# Patient Record
Sex: FEMALE | Race: OTHER | URBAN - METROPOLITAN AREA
[De-identification: names, ages, dates, MRNs, and addresses within clinical notes are randomized per-mention and may not be internally consistent; named-entity substitution may affect disease eponyms.]

---

## 2020-05-21 ENCOUNTER — OP HISTORICAL/CONVERTED ENCOUNTER (OUTPATIENT)
Dept: OTHER | Age: 60
End: 2020-05-21

## 2020-06-01 ENCOUNTER — OP HISTORICAL/CONVERTED ENCOUNTER (OUTPATIENT)
Dept: OTHER | Age: 60
End: 2020-06-01

## 2020-06-18 ENCOUNTER — ED HISTORICAL/CONVERTED ENCOUNTER (OUTPATIENT)
Dept: OTHER | Age: 60
End: 2020-06-18

## 2021-03-03 ENCOUNTER — TRANSCRIBE ORDER (OUTPATIENT)
Dept: SCHEDULING | Age: 61
End: 2021-03-03

## 2021-03-03 DIAGNOSIS — Z12.31 VISIT FOR SCREENING MAMMOGRAM: Primary | ICD-10-CM

## 2023-05-20 RX ORDER — HYDROCHLOROTHIAZIDE 25 MG/1
25 TABLET ORAL DAILY
COMMUNITY
Start: 2016-06-03

## 2023-09-29 ENCOUNTER — APPOINTMENT (OUTPATIENT)
Facility: HOSPITAL | Age: 63
End: 2023-09-29
Payer: COMMERCIAL

## 2023-09-29 ENCOUNTER — HOSPITAL ENCOUNTER (INPATIENT)
Facility: HOSPITAL | Age: 63
LOS: 3 days | Discharge: HOME HEALTH | End: 2023-10-03
Attending: EMERGENCY MEDICINE | Admitting: HOSPITALIST
Payer: COMMERCIAL

## 2023-09-29 DIAGNOSIS — I10 MALIGNANT HYPERTENSION: Primary | ICD-10-CM

## 2023-09-29 DIAGNOSIS — R42 DIZZINESS: ICD-10-CM

## 2023-09-29 DIAGNOSIS — I67.1 CEREBRAL ANEURYSM: ICD-10-CM

## 2023-09-29 DIAGNOSIS — I16.0 HYPERTENSIVE URGENCY: ICD-10-CM

## 2023-09-29 LAB
ABO + RH BLD: NORMAL
ALBUMIN SERPL-MCNC: 3.8 G/DL (ref 3.5–5)
ALBUMIN/GLOB SERPL: 1 (ref 1.1–2.2)
ALP SERPL-CCNC: 116 U/L (ref 45–117)
ALT SERPL-CCNC: 20 U/L (ref 12–78)
ANION GAP SERPL CALC-SCNC: 10 MMOL/L (ref 5–15)
APTT PPP: 33.4 SEC (ref 21.2–34.1)
AST SERPL W P-5'-P-CCNC: 17 U/L (ref 15–37)
BASOPHILS # BLD: 0.1 K/UL (ref 0–0.1)
BASOPHILS NFR BLD: 1 % (ref 0–1)
BILIRUB SERPL-MCNC: 0.3 MG/DL (ref 0.2–1)
BLOOD GROUP ANTIBODIES SERPL: NEGATIVE
BNP SERPL-MCNC: 874 PG/ML
BUN SERPL-MCNC: 33 MG/DL (ref 6–20)
BUN/CREAT SERPL: 26 (ref 12–20)
CA-I BLD-MCNC: 9.3 MG/DL (ref 8.5–10.1)
CHLORIDE SERPL-SCNC: 107 MMOL/L (ref 97–108)
CO2 SERPL-SCNC: 25 MMOL/L (ref 21–32)
CREAT SERPL-MCNC: 1.27 MG/DL (ref 0.55–1.02)
DIFFERENTIAL METHOD BLD: ABNORMAL
EOSINOPHIL # BLD: 0.3 K/UL (ref 0–0.4)
EOSINOPHIL NFR BLD: 3 % (ref 0–7)
ERYTHROCYTE [DISTWIDTH] IN BLOOD BY AUTOMATED COUNT: 13.1 % (ref 11.5–14.5)
GLOBULIN SER CALC-MCNC: 3.7 G/DL (ref 2–4)
GLUCOSE SERPL-MCNC: 128 MG/DL (ref 65–100)
HCT VFR BLD AUTO: 40.1 % (ref 35–47)
HGB BLD-MCNC: 13.6 G/DL (ref 11.5–16)
IMM GRANULOCYTES # BLD AUTO: 0 K/UL (ref 0–0.04)
IMM GRANULOCYTES NFR BLD AUTO: 0 % (ref 0–0.5)
INR PPP: 0.9 (ref 0.9–1.1)
LYMPHOCYTES # BLD: 4.7 K/UL (ref 0.8–3.5)
LYMPHOCYTES NFR BLD: 47 % (ref 12–49)
MAGNESIUM SERPL-MCNC: 2.3 MG/DL (ref 1.6–2.4)
MCH RBC QN AUTO: 29.6 PG (ref 26–34)
MCHC RBC AUTO-ENTMCNC: 33.9 G/DL (ref 30–36.5)
MCV RBC AUTO: 87.4 FL (ref 80–99)
MONOCYTES # BLD: 0.7 K/UL (ref 0–1)
MONOCYTES NFR BLD: 7 % (ref 5–13)
NEUTS SEG # BLD: 4.2 K/UL (ref 1.8–8)
NEUTS SEG NFR BLD: 42 % (ref 32–75)
NRBC # BLD: 0 K/UL (ref 0–0.01)
NRBC BLD-RTO: 0 PER 100 WBC
PLATELET # BLD AUTO: 220 K/UL (ref 150–400)
PMV BLD AUTO: 12.5 FL (ref 8.9–12.9)
POTASSIUM SERPL-SCNC: 4 MMOL/L (ref 3.5–5.1)
PROT SERPL-MCNC: 7.5 G/DL (ref 6.4–8.2)
PROTHROMBIN TIME: 13 SEC (ref 11.9–14.6)
RBC # BLD AUTO: 4.59 M/UL (ref 3.8–5.2)
SODIUM SERPL-SCNC: 142 MMOL/L (ref 136–145)
SPECIMEN EXP DATE BLD: NORMAL
THERAPEUTIC RANGE: NORMAL SEC (ref 82–109)
TROPONIN I SERPL HS-MCNC: 16 NG/L (ref 0–51)
WBC # BLD AUTO: 10 K/UL (ref 3.6–11)

## 2023-09-29 PROCEDURE — 96375 TX/PRO/DX INJ NEW DRUG ADDON: CPT

## 2023-09-29 PROCEDURE — 94761 N-INVAS EAR/PLS OXIMETRY MLT: CPT

## 2023-09-29 PROCEDURE — 86850 RBC ANTIBODY SCREEN: CPT

## 2023-09-29 PROCEDURE — 83880 ASSAY OF NATRIURETIC PEPTIDE: CPT

## 2023-09-29 PROCEDURE — 84484 ASSAY OF TROPONIN QUANT: CPT

## 2023-09-29 PROCEDURE — 85610 PROTHROMBIN TIME: CPT

## 2023-09-29 PROCEDURE — 85025 COMPLETE CBC W/AUTO DIFF WBC: CPT

## 2023-09-29 PROCEDURE — 96374 THER/PROPH/DIAG INJ IV PUSH: CPT

## 2023-09-29 PROCEDURE — 99285 EMERGENCY DEPT VISIT HI MDM: CPT

## 2023-09-29 PROCEDURE — 80053 COMPREHEN METABOLIC PANEL: CPT

## 2023-09-29 PROCEDURE — 93005 ELECTROCARDIOGRAM TRACING: CPT | Performed by: EMERGENCY MEDICINE

## 2023-09-29 PROCEDURE — 86900 BLOOD TYPING SEROLOGIC ABO: CPT

## 2023-09-29 PROCEDURE — 71045 X-RAY EXAM CHEST 1 VIEW: CPT

## 2023-09-29 PROCEDURE — 6360000002 HC RX W HCPCS: Performed by: EMERGENCY MEDICINE

## 2023-09-29 PROCEDURE — 85730 THROMBOPLASTIN TIME PARTIAL: CPT

## 2023-09-29 PROCEDURE — 70450 CT HEAD/BRAIN W/O DYE: CPT

## 2023-09-29 PROCEDURE — 6360000004 HC RX CONTRAST MEDICATION: Performed by: EMERGENCY MEDICINE

## 2023-09-29 PROCEDURE — 36415 COLL VENOUS BLD VENIPUNCTURE: CPT

## 2023-09-29 PROCEDURE — 2500000003 HC RX 250 WO HCPCS: Performed by: EMERGENCY MEDICINE

## 2023-09-29 PROCEDURE — 83735 ASSAY OF MAGNESIUM: CPT

## 2023-09-29 PROCEDURE — 70498 CT ANGIOGRAPHY NECK: CPT

## 2023-09-29 PROCEDURE — 86901 BLOOD TYPING SEROLOGIC RH(D): CPT

## 2023-09-29 PROCEDURE — 2580000003 HC RX 258: Performed by: EMERGENCY MEDICINE

## 2023-09-29 RX ORDER — LABETALOL HYDROCHLORIDE 5 MG/ML
20 INJECTION, SOLUTION INTRAVENOUS ONCE
Status: DISCONTINUED | OUTPATIENT
Start: 2023-09-29 | End: 2023-09-29

## 2023-09-29 RX ORDER — NITROGLYCERIN 20 MG/100ML
5-200 INJECTION INTRAVENOUS CONTINUOUS
Status: DISCONTINUED | OUTPATIENT
Start: 2023-09-29 | End: 2023-09-29

## 2023-09-29 RX ORDER — LABETALOL HYDROCHLORIDE 5 MG/ML
10 INJECTION, SOLUTION INTRAVENOUS ONCE
Status: COMPLETED | OUTPATIENT
Start: 2023-09-29 | End: 2023-09-29

## 2023-09-29 RX ADMIN — IOPAMIDOL 100 ML: 755 INJECTION, SOLUTION INTRAVENOUS at 23:26

## 2023-09-29 RX ADMIN — SODIUM CHLORIDE 5 MG/HR: 9 INJECTION, SOLUTION INTRAVENOUS at 23:57

## 2023-09-29 RX ADMIN — LABETALOL HYDROCHLORIDE 10 MG: 5 INJECTION INTRAVENOUS at 22:32

## 2023-09-29 ASSESSMENT — PAIN - FUNCTIONAL ASSESSMENT: PAIN_FUNCTIONAL_ASSESSMENT: 0-10

## 2023-09-29 ASSESSMENT — LIFESTYLE VARIABLES
HOW OFTEN DO YOU HAVE A DRINK CONTAINING ALCOHOL: NEVER
HOW MANY STANDARD DRINKS CONTAINING ALCOHOL DO YOU HAVE ON A TYPICAL DAY: PATIENT DOES NOT DRINK

## 2023-09-30 PROBLEM — I10 MALIGNANT HYPERTENSION: Status: ACTIVE | Noted: 2023-09-30

## 2023-09-30 PROBLEM — I67.1 ANEURYSM OF INTRACRANIAL PORTION OF INTERNAL CAROTID ARTERY: Status: ACTIVE | Noted: 2023-09-30

## 2023-09-30 LAB
ALBUMIN SERPL-MCNC: 3.6 G/DL (ref 3.5–5)
ANION GAP SERPL CALC-SCNC: 7 MMOL/L (ref 5–15)
BUN SERPL-MCNC: 23 MG/DL (ref 6–20)
BUN/CREAT SERPL: 20 (ref 12–20)
CA-I BLD-MCNC: 9.3 MG/DL (ref 8.5–10.1)
CHLORIDE SERPL-SCNC: 107 MMOL/L (ref 97–108)
CO2 SERPL-SCNC: 28 MMOL/L (ref 21–32)
CREAT SERPL-MCNC: 1.14 MG/DL (ref 0.55–1.02)
EKG ATRIAL RATE: 71 BPM
EKG DIAGNOSIS: NORMAL
EKG P AXIS: 24 DEGREES
EKG P-R INTERVAL: 156 MS
EKG Q-T INTERVAL: 376 MS
EKG QRS DURATION: 88 MS
EKG QTC CALCULATION (BAZETT): 408 MS
EKG R AXIS: 23 DEGREES
EKG T AXIS: 125 DEGREES
EKG VENTRICULAR RATE: 71 BPM
GLUCOSE SERPL-MCNC: 116 MG/DL (ref 65–100)
PHOSPHATE SERPL-MCNC: 3.2 MG/DL (ref 2.6–4.7)
POTASSIUM SERPL-SCNC: 3.1 MMOL/L (ref 3.5–5.1)
SODIUM SERPL-SCNC: 142 MMOL/L (ref 136–145)
TROPONIN I SERPL HS-MCNC: 17 NG/L (ref 0–51)

## 2023-09-30 PROCEDURE — 6360000002 HC RX W HCPCS: Performed by: HOSPITALIST

## 2023-09-30 PROCEDURE — 6370000000 HC RX 637 (ALT 250 FOR IP): Performed by: HOSPITALIST

## 2023-09-30 PROCEDURE — 80069 RENAL FUNCTION PANEL: CPT

## 2023-09-30 PROCEDURE — 94761 N-INVAS EAR/PLS OXIMETRY MLT: CPT

## 2023-09-30 PROCEDURE — 6370000000 HC RX 637 (ALT 250 FOR IP): Performed by: INTERNAL MEDICINE

## 2023-09-30 PROCEDURE — 2580000003 HC RX 258: Performed by: HOSPITALIST

## 2023-09-30 PROCEDURE — 84484 ASSAY OF TROPONIN QUANT: CPT

## 2023-09-30 PROCEDURE — 2000000000 HC ICU R&B

## 2023-09-30 PROCEDURE — 36415 COLL VENOUS BLD VENIPUNCTURE: CPT

## 2023-09-30 RX ORDER — POTASSIUM CHLORIDE 20 MEQ/1
20 TABLET, EXTENDED RELEASE ORAL 3 TIMES DAILY
Status: COMPLETED | OUTPATIENT
Start: 2023-09-30 | End: 2023-09-30

## 2023-09-30 RX ORDER — SODIUM CHLORIDE 0.9 % (FLUSH) 0.9 %
5-40 SYRINGE (ML) INJECTION PRN
Status: DISCONTINUED | OUTPATIENT
Start: 2023-09-30 | End: 2023-10-03 | Stop reason: HOSPADM

## 2023-09-30 RX ORDER — LOSARTAN POTASSIUM 50 MG/1
50 TABLET ORAL DAILY
Status: DISCONTINUED | OUTPATIENT
Start: 2023-09-30 | End: 2023-10-03 | Stop reason: HOSPADM

## 2023-09-30 RX ORDER — NICOTINE 21 MG/24HR
1 PATCH, TRANSDERMAL 24 HOURS TRANSDERMAL DAILY
Status: DISCONTINUED | OUTPATIENT
Start: 2023-09-30 | End: 2023-10-03 | Stop reason: HOSPADM

## 2023-09-30 RX ORDER — SODIUM CHLORIDE 9 MG/ML
INJECTION, SOLUTION INTRAVENOUS PRN
Status: DISCONTINUED | OUTPATIENT
Start: 2023-09-30 | End: 2023-10-03 | Stop reason: HOSPADM

## 2023-09-30 RX ORDER — ACETAMINOPHEN 325 MG/1
650 TABLET ORAL EVERY 6 HOURS PRN
Status: DISCONTINUED | OUTPATIENT
Start: 2023-09-30 | End: 2023-10-03 | Stop reason: HOSPADM

## 2023-09-30 RX ORDER — AMLODIPINE BESYLATE 10 MG/1
10 TABLET ORAL DAILY
COMMUNITY

## 2023-09-30 RX ORDER — MAGNESIUM SULFATE IN WATER 40 MG/ML
2000 INJECTION, SOLUTION INTRAVENOUS ONCE
Status: COMPLETED | OUTPATIENT
Start: 2023-09-30 | End: 2023-09-30

## 2023-09-30 RX ORDER — SODIUM CHLORIDE 0.9 % (FLUSH) 0.9 %
5-40 SYRINGE (ML) INJECTION EVERY 12 HOURS SCHEDULED
Status: DISCONTINUED | OUTPATIENT
Start: 2023-09-30 | End: 2023-10-03 | Stop reason: HOSPADM

## 2023-09-30 RX ORDER — AMLODIPINE BESYLATE 5 MG/1
5 TABLET ORAL NIGHTLY
Status: DISCONTINUED | OUTPATIENT
Start: 2023-09-30 | End: 2023-10-03 | Stop reason: HOSPADM

## 2023-09-30 RX ORDER — ACETAMINOPHEN 650 MG/1
650 SUPPOSITORY RECTAL EVERY 6 HOURS PRN
Status: DISCONTINUED | OUTPATIENT
Start: 2023-09-30 | End: 2023-10-03 | Stop reason: HOSPADM

## 2023-09-30 RX ORDER — HYDROCHLOROTHIAZIDE 25 MG/1
25 TABLET ORAL DAILY
Status: DISCONTINUED | OUTPATIENT
Start: 2023-09-30 | End: 2023-10-03 | Stop reason: HOSPADM

## 2023-09-30 RX ORDER — ONDANSETRON 2 MG/ML
4 INJECTION INTRAMUSCULAR; INTRAVENOUS EVERY 6 HOURS PRN
Status: DISCONTINUED | OUTPATIENT
Start: 2023-09-30 | End: 2023-10-03 | Stop reason: HOSPADM

## 2023-09-30 RX ORDER — ONDANSETRON 4 MG/1
4 TABLET, ORALLY DISINTEGRATING ORAL EVERY 8 HOURS PRN
Status: DISCONTINUED | OUTPATIENT
Start: 2023-09-30 | End: 2023-10-03 | Stop reason: HOSPADM

## 2023-09-30 RX ADMIN — POTASSIUM CHLORIDE 20 MEQ: 1500 TABLET, EXTENDED RELEASE ORAL at 17:41

## 2023-09-30 RX ADMIN — LOSARTAN POTASSIUM 50 MG: 50 TABLET, FILM COATED ORAL at 09:12

## 2023-09-30 RX ADMIN — MAGNESIUM SULFATE HEPTAHYDRATE 2000 MG: 40 INJECTION, SOLUTION INTRAVENOUS at 02:31

## 2023-09-30 RX ADMIN — HYDROCHLOROTHIAZIDE 25 MG: 25 TABLET ORAL at 09:12

## 2023-09-30 RX ADMIN — AMLODIPINE BESYLATE 5 MG: 5 TABLET ORAL at 21:22

## 2023-09-30 RX ADMIN — AMLODIPINE BESYLATE 5 MG: 5 TABLET ORAL at 05:10

## 2023-09-30 RX ADMIN — POTASSIUM CHLORIDE 20 MEQ: 1500 TABLET, EXTENDED RELEASE ORAL at 21:22

## 2023-09-30 RX ADMIN — SODIUM CHLORIDE, PRESERVATIVE FREE 10 ML: 5 INJECTION INTRAVENOUS at 09:16

## 2023-09-30 RX ADMIN — SODIUM CHLORIDE, PRESERVATIVE FREE 10 ML: 5 INJECTION INTRAVENOUS at 21:22

## 2023-09-30 ASSESSMENT — ENCOUNTER SYMPTOMS
COUGH: 0
GASTROINTESTINAL NEGATIVE: 1
SHORTNESS OF BREATH: 0

## 2023-09-30 NOTE — CARE COORDINATION
09/30/23 1407   Service Assessment   Patient Orientation Alert and Oriented   Cognition Alert   History Provided By Patient   Primary [facility]   PCP Verified by CM Yes   Last Visit to PCP Within last two years   Prior Functional Level Independent in ADLs/IADLs   Current Functional Level Independent in ADLs/IADLs   Can patient return to prior living arrangement Yes   Family able to assist with home care needs: Yes   Would you like for me to discuss the discharge plan with any other family members/significant others, and if so, who? Yes   Financial Resources Other (Comment)  ([employer])   Community Resources None   Social/Functional History   Lives With Alone   Type of [address] [place] [address] Center  One level   Home Access Stairs to enter with rails   Entrance Stairs - Number of Steps 4   Bathroom Shower/Tub None   Bathroom Equipment None   Bathroom Accessibility Accessible   Home Equipment None   Receives Help From Family   ADL Assistance Independent   [facility]   Ambulation Assistance Independent   Transfer Assistance Independent   Active  Yes   Mode of Transportation Car   Occupation Part time employment;Student: College   Discharge Planning   Type of [address] Prior To Admission None   Potential Assistance Needed N/A   DME Ordered? No   Potential Assistance Purchasing Medications No   Type of Home Care Services None   Patient expects to be discharged to: House   History of falls? 0   Services At/After Discharge   Transition of Care Consult (CM Consult) N/A   Services At/After Discharge None   [employer] Information Provided?  No   Mode of Transport at Discharge Other (see comment)   Confirm Follow Up Transport Family   Condition of Participation: Discharge Planning   Freedom of Choice list was provided with basic dialogue that supports the patient's individualized plan of care/goals, treatment

## 2023-09-30 NOTE — ED NOTES
.. TRANSFER - OUT REPORT:    Verbal report given to Per[facility] on [facility]  being transferred to ICU for routine progression of patient care       Report consisted of patient's Situation, Background, Assessment and   Recommendations(SBAR). Information from the following report(s) Nurse Handoff Report, ED Encounter Summary, ED SBAR, MAR, Recent Results, and Neuro Assessment was reviewed with the receiving nurse. Lines:   Peripheral IV 09/29/23 Left Antecubital (Active)       Peripheral IV 09/30/23 Left;Posterior Hand (Active)        Opportunity for questions and clarification was provided.       Patient transported with:  Monitor and Registered Nurse          Milo Tomlin RN  09/30/23 0107

## 2023-09-30 NOTE — PROGRESS NOTES
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 Patient was found to have a systolic blood pressure of approximately 250 and was started on a nicardipine drip he recently discontinued all of her medications stating she no longer needed them. She was started on amlodipine, losartan and hydrochlorothiazide and she is now off the nicardipine drip. Assessment / Plan:    1. Malignant hypertension  Nicardipine drip as needed  Continue amlodipine  Continue losartan  Continue hydrochlorothiazide  Echocardiogram    2. Brain aneurysm with  CTA of the head and neck revealed severe stenosis of the M2 on the right with moderate to severe stenosis of the basilar artery. There is post coiling of the anterior communicating artery aneurysm. There is aneurysmal dilatation of the ICA. Discussed case with telemetry neuro and patient will receive outpatient therapy with neuro interventional radiology. 3.  RASHAD  Improving with blood pressure management    Medical Decision Making:   I personally reviewed labs:1.14 creatinine  I personally reviewed imaging: CTA of the head neck with multiple aneurysms  Toxic drug monitoring: Monitor for bleeding while on Lovenox with daily laboratory analysis  Discussed case with: Cardiology and Tele neuro        Code Status: Full code  DVT Prophylaxis: Lovenox  GI Prophylaxis: Not indicated    Subjective:     Chief Complaint / Reason for Physician Visit  Headache with malignant hypertension. Discussed with RN events overnight. Objective:     VITALS:   Last 24hrs VS reviewed since prior progress note.  Most recent are:  Patient

## 2023-09-30 NOTE — CONSULTS
9/30/2023, 2:50 PM        [address] [place], [address]  Phone: [phone]      Cardiology Consult      9/30/2023, 2:50 PM      [address]  58 y.o. female  1960      Admit Date:  9/29/2023    Primary Cardiologist:  Dr. Ghada Asif  Reason for consult: Malignant hypertension and short bursts of VT  Requesting provider: Dr. rTudy Calderon:      History of malignant hypertension which is exacerbated by noncompliance with medications. Brief spell of VT with questionable torsade de pointes  History of intracerebral aneurysm with coiling procedure in past.  Nicotine addiction     Plan:      Agree with current antihypertensive regimen inclusive of hydrochlorothiazide, losartan and amlodipine. I will also supplement patient antihypertensive with labetalol while weaning of IV nicardipine drip. Echocardiogram for LV function and valvular function. Renal artery duplex scan to  assess renal artery stenosis as cause of patient's uncontrolled hypertension. Pharmacologic stress MPI study for ischemia evaluation of NSVT prior to discharge. Subjective     This is a 76-year-old female with history of poorly controlled hypertension due to noncompliance with medications, cerebral arterial aneurysm, status post coiling of the anterior communicating arterial system and no known prior history of coronary artery disease who presented to emergency room with complaints of headache, dizziness and uncontrolled hypertension. She denied any chest pain, dyspnea or visual impairment. Patient's blood pressure on presentation was 242/158. For her uncontrolled severe hypertension, she was treated with amlodipine 5 mg, labetalol 10 mg IV and started on IV nicardipine drip. Patient's symptoms subsequently resolved with better blood pressure control.   As per Dr. Yasmin Kate note, at the time of my interview she appeared to have brief run of V. tach with torsades this or any previous visit. Echo:     No results found for this or any previous visit. Total time spent: 60  minutes. Dr. Jesu Castelan MD.PhD. [facility]

## 2023-09-30 NOTE — ED PROVIDER NOTES
[facility] EMERGENCY DEPT  EMERGENCY DEPARTMENT HISTORY AND PHYSICAL EXAM      Date: 9/29/2023  Patient Name: Julia Florez  MRN: [id number]  [address] [facility] [place] 1960  Date of evaluation: 9/29/2023  Provider: Edvin Kline DO   Note Started: 10:10 PM EDT 9/29/23    HISTORY OF PRESENT ILLNESS     Chief Complaint   Patient presents with    Hypertension    Dizziness     History Provided By: Patient    HPI: Julia Florez is a 58 y.o. female with past medical history of ruptured brain aneurysm causing hemorrhagic stroke, aortic aneurysm, and hypertension who presents with dizziness and headache. Patient has had intermittent dizziness and headache for the last 2 weeks. States that episodes start with dizziness followed by moderate headache. She had an episode this evening while at a party. States she is no longer feeling dizzy. She has no headache. Headache is not thunderclap. It is not worst headache of her life. PAST MEDICAL HISTORY   Past Medical History:  Past Medical History:   Diagnosis Date    Brain aneurysm     Hypertension        Past Surgical History:  Past Surgical History:   Procedure Laterality Date    ENDOVASCULAR REPAIR PERIPHERAL ANEURYSM         Family History:  Family History   Family history unknown: Yes       Social History:  Social History     Tobacco Use    Smoking status: Every Day     Types: Cigarettes    Smokeless tobacco: Never   Substance Use Topics    Alcohol use: Yes     Comment: occasionally       Allergies:   Allergies   Allergen Reactions    Penicillins Anaphylaxis       PCP: SOPHIE Vasquez NP    Current Meds:   Current Facility-Administered Medications   Medication Dose Route Frequency Provider Last Rate Last Admin    magnesium sulfate 2000 mg in 50 mL IVPB premix  2,000 mg IntraVENous Once Retha Aschoff, MD 25 mL/hr at 09/30/23 0231 2,000 mg at 09/30/23 0231    hydroCHLOROthiazide (HYDRODIURIL) tablet 25 mg  25 mg Oral Daily Retha Aschoff, specified. DISCHARGE MEDICATIONS:     Medication List        ASK your doctor about these medications      amLODIPine 10 MG tablet  Commonly known as: NORVASC     hydroCHLOROthiazide 25 MG tablet  Commonly known as: HYDRODIURIL              DISCONTINUED MEDICATIONS:  Current Discharge Medication List          ED FINAL IMPRESSION     1. Hypertensive urgency    2. Dizziness    3. Cerebral aneurysm         I am the primary provider of record. Shaila Gonzalez DO (electronically signed)    (Please note that parts of this dictation were completed with voice recognition software. Quite often unanticipated grammatical, syntax, homophones, and other interpretive errors are inadvertently transcribed by the computer software. Please disregards these errors.  Please excuse any errors that have escaped final proofreading.)       Shaila Gonzalez DO  09/30/23 8557

## 2023-09-30 NOTE — H&P
2.4 mg/dL   Protime-INR    Collection Time: 09/29/23 10:16 PM   Result Value Ref Range    Protime 13.0 11.9 - 14.6 sec    INR 0.9 0.9 - 1.1     APTT    Collection Time: 09/29/23 10:16 PM   Result Value Ref Range    PTT 33.4 21.2 - 34.1 sec    Therapeutic Range   82 - 109 sec   TYPE AND SCREEN    Collection Time: 09/29/23 10:16 PM   Result Value Ref Range    Crossmatch expiration date 10/02/2023,2359     ABO/Rh O Positive     Antibody Screen Negative    Troponin    Collection Time: 09/29/23 10:16 PM   Result Value Ref Range    Troponin, High Sensitivity 16 0 - 51 ng/L   Troponin    Collection Time: 09/30/23 12:05 AM   Result Value Ref Range    Troponin, High Sensitivity 17 0 - 51 ng/L       Radiologic Studies :   Imaging:   CT HEAD WO CONTRAST   Final Result      No acute process. CTA HEAD NECK W CONTRAST   Final Result   Severe atherosclerotic vasculopathy. Chronic left vertebral artery occlusion. Moderate to severe stenosis of the proximal M2 segment posterior division on the   right. Moderate to severe stenoses of the basilar artery. Patient is status post coiling of anterior communicating artery aneurysm. Multiple intracranial aneurysms   Aneurysmal dilatation of the supraclinoid ICA on the right occipital 6.9 mm in   size. Caudally oriented aneurysm of the proximal left MCA M1 measures 3.7 x 2 mm in   size. Small medially oriented aneurysm of the supraclinoid ICA on the right. XR CHEST 1 VIEW   Final Result   No acute cardiopulmonary disease. Assessment and Plan :     Malignant hypertension: Patient is noncompliant with treatment did not have any medications for few weeks. She is already started on Cardene drip in the emergency room. We will continue but titrate not to drop systolic blood pressure below 160. Added amlodipine, hydrochlorothiazide and losartan. We will gradually start the medications.   But we will avoid significant drop in blood pressure in the first 24 hours. Aneurysm of proximal left MCA, and multiple intracranial aneurysms. Recently has a coiling of anterior communicating artery aneurysm. Chronic smoker: Ordered nicotine patch. Admitted to ICU. Medications Home :    Reviewed -not taking any medications at this time confirmed with the daughter. Code status : Full code    VTE prophylaxis : Anticoagulation not ordered due to risk of increased bleeding    Advance Medical directive : No health care decision maker information is on file. Discussion/MDM:   I have discussed patient's presentation/findings and clinical course to date with ED provider. Given the patient's current clinical presentation, I have a high level of concern for decompensation if discharged from the emergency department as patient has multiple medical comorbidities with increased risk of morbidity and mortality  that warrants admission to hospital.     I have reviewed patient's presenting subjective and objective findings, as well as all laboratory studies, imaging studies, and vital signs to date as well as treatment rendered and patient's response to those treatments. In addition, prior medical, surgical and relevant social and family histories were reviewed. CC : SOPHIE Vasquez - NP  Signed By: Retha Aschoff, MD     September 30, 2023      This dictation was done by dragon, computer voice recognition software. Often unanticipated grammatical, syntax, [employer] phones and other interpretive errors are inadvertently transcribed. Please excuse errors that have escaped final proofreading.

## 2023-09-30 NOTE — ED TRIAGE NOTES
Pt reports high blood pressure, pt states she hasn't taken her medications in a week. Denies chest pain or SOB.  Pt also reports a headache

## 2023-10-01 ENCOUNTER — APPOINTMENT (OUTPATIENT)
Facility: HOSPITAL | Age: 63
End: 2023-10-01
Attending: INTERNAL MEDICINE
Payer: COMMERCIAL

## 2023-10-01 ENCOUNTER — APPOINTMENT (OUTPATIENT)
Facility: HOSPITAL | Age: 63
End: 2023-10-01
Payer: COMMERCIAL

## 2023-10-01 LAB
ALBUMIN SERPL-MCNC: 3.4 G/DL (ref 3.5–5)
ANION GAP SERPL CALC-SCNC: 5 MMOL/L (ref 5–15)
BASOPHILS # BLD: 0.1 K/UL (ref 0–0.1)
BASOPHILS NFR BLD: 1 % (ref 0–1)
BUN SERPL-MCNC: 29 MG/DL (ref 6–20)
BUN/CREAT SERPL: 20 (ref 12–20)
CA-I BLD-MCNC: 9.3 MG/DL (ref 8.5–10.1)
CHLORIDE SERPL-SCNC: 106 MMOL/L (ref 97–108)
CO2 SERPL-SCNC: 28 MMOL/L (ref 21–32)
CREAT SERPL-MCNC: 1.47 MG/DL (ref 0.55–1.02)
DIFFERENTIAL METHOD BLD: NORMAL
EOSINOPHIL # BLD: 0.3 K/UL (ref 0–0.4)
EOSINOPHIL NFR BLD: 4 % (ref 0–7)
ERYTHROCYTE [DISTWIDTH] IN BLOOD BY AUTOMATED COUNT: 13.3 % (ref 11.5–14.5)
GLUCOSE SERPL-MCNC: 121 MG/DL (ref 65–100)
HCT VFR BLD AUTO: 40.4 % (ref 35–47)
HGB BLD-MCNC: 13.6 G/DL (ref 11.5–16)
IMM GRANULOCYTES # BLD AUTO: 0 K/UL (ref 0–0.04)
IMM GRANULOCYTES NFR BLD AUTO: 0 % (ref 0–0.5)
LYMPHOCYTES # BLD: 3.3 K/UL (ref 0.8–3.5)
LYMPHOCYTES NFR BLD: 40 % (ref 12–49)
MCH RBC QN AUTO: 29.4 PG (ref 26–34)
MCHC RBC AUTO-ENTMCNC: 33.7 G/DL (ref 30–36.5)
MCV RBC AUTO: 87.4 FL (ref 80–99)
MONOCYTES # BLD: 0.8 K/UL (ref 0–1)
MONOCYTES NFR BLD: 10 % (ref 5–13)
NEUTS SEG # BLD: 3.7 K/UL (ref 1.8–8)
NEUTS SEG NFR BLD: 45 % (ref 32–75)
NRBC # BLD: 0 K/UL (ref 0–0.01)
NRBC BLD-RTO: 0 PER 100 WBC
PHOSPHATE SERPL-MCNC: 3.5 MG/DL (ref 2.6–4.7)
PLATELET # BLD AUTO: 211 K/UL (ref 150–400)
PMV BLD AUTO: 12.7 FL (ref 8.9–12.9)
POTASSIUM SERPL-SCNC: 3.5 MMOL/L (ref 3.5–5.1)
RBC # BLD AUTO: 4.62 M/UL (ref 3.8–5.2)
SODIUM SERPL-SCNC: 139 MMOL/L (ref 136–145)
WBC # BLD AUTO: 8.2 K/UL (ref 3.6–11)

## 2023-10-01 PROCEDURE — 93975 VASCULAR STUDY: CPT

## 2023-10-01 PROCEDURE — 2580000003 HC RX 258: Performed by: HOSPITALIST

## 2023-10-01 PROCEDURE — 85025 COMPLETE CBC W/AUTO DIFF WBC: CPT

## 2023-10-01 PROCEDURE — 6370000000 HC RX 637 (ALT 250 FOR IP): Performed by: HOSPITALIST

## 2023-10-01 PROCEDURE — 80069 RENAL FUNCTION PANEL: CPT

## 2023-10-01 PROCEDURE — 2060000000 HC ICU INTERMEDIATE R&B

## 2023-10-01 PROCEDURE — 36415 COLL VENOUS BLD VENIPUNCTURE: CPT

## 2023-10-01 PROCEDURE — 93975 VASCULAR STUDY: CPT | Performed by: SURGERY

## 2023-10-01 PROCEDURE — 93308 TTE F-UP OR LMTD: CPT

## 2023-10-01 RX ADMIN — LOSARTAN POTASSIUM 50 MG: 50 TABLET, FILM COATED ORAL at 09:22

## 2023-10-01 RX ADMIN — HYDROCHLOROTHIAZIDE 25 MG: 25 TABLET ORAL at 09:22

## 2023-10-01 RX ADMIN — SODIUM CHLORIDE, PRESERVATIVE FREE 10 ML: 5 INJECTION INTRAVENOUS at 20:30

## 2023-10-01 RX ADMIN — AMLODIPINE BESYLATE 5 MG: 5 TABLET ORAL at 20:30

## 2023-10-01 ASSESSMENT — ENCOUNTER SYMPTOMS
SHORTNESS OF BREATH: 0
COUGH: 0
GASTROINTESTINAL NEGATIVE: 1

## 2023-10-01 NOTE — PLAN OF CARE
Problem: Discharge Planning  Goal: Discharge to home or other facility with appropriate resources  9/30/2023 2002 by Kerri Adams RN  Outcome: Progressing  Flowsheets (Taken 9/30/2023 2000)  Discharge to home or other facility with appropriate resources: Identify barriers to discharge with patient and caregiver     Problem: Pain  Goal: Verbalizes/displays adequate comfort level or baseline comfort level  Recent Flowsheet Documentation  Taken 9/30/2023 2355 by Kerri Adams RN  Verbalizes/displays adequate comfort level or baseline comfort level: Assess pain using appropriate pain scale  9/30/2023 2002 by Kerri Adams RN  Outcome: Progressing  Flowsheets (Taken 9/30/2023 1900)  Verbalizes/displays adequate comfort level or baseline comfort level: Assess pain using appropriate pain scale     Problem: Safety - Adult  Goal: Free from fall injury  9/30/2023 2002 by Kerri Adams RN  Outcome: Progressing

## 2023-10-01 NOTE — PROGRESS NOTES
Patient new transfer from ICU. Patient alert and oriented x 4, not in acute distress. Skin assessment: Skin intact. No wounds noted.

## 2023-10-01 NOTE — PROGRESS NOTES
Vascular Access Team Consult Note: received consult from nursing for PIVL replacement for stress test.     Visited with client, she was eating lunch having just returned from tests downstairs. Plan to re-visit. Primary care nurse, Yao Love, notified.

## 2023-10-01 NOTE — PROCEDURES
Vascular Access Team Consult Note: received consult for PIV replacement for upcoming procedures per primary care overnight nurse. Ultrasound-guided (USG) PIV placement: see Epic Avatar and EHR flowsheet date for additional vascular access device and procedural information. 22 g 1 inch Diffusics Nexiva PIV placed with ultrasound-guidance x 1 attempt in left posterior/lateral forearm vein. Brisk blood return noted, flushed easily with 10 mL NS. Dressed with sterile skin barrier prep wipe and 3M CHG wafer transparent Tegaderm dressing. Needle-free connector and alcohol swab end caps utilized. Client tolerated well, no patient complaints. Client continues to benefit from ultrasound-guided PIV placement due to limited suitable veins for USG cannulation, vein depth, and small vein diameters. Primary care nurse, Mariam Sosa, notified. Please re-enter IP PICC Team Consult in [facility] for any further vascular access needs.      Jeferson Hodgson RN

## 2023-10-01 NOTE — PROGRESS NOTES
Normal heart sounds. Pulmonary:      Effort: Pulmonary effort is normal.      Breath sounds: Normal breath sounds. Abdominal:      General: Abdomen is flat. Bowel sounds are normal.      Palpations: Abdomen is soft. Musculoskeletal:         General: Normal range of motion. Cervical back: Normal range of motion and neck supple. Skin:     General: Skin is warm and dry. Neurological:      Mental Status: She is alert and oriented to person, place, and time. Psychiatric:         Mood and Affect: Mood normal.          Reviewed most current lab test results and cultures  YES  Reviewed most current radiology test results   YES  Review and summation of old records today    NO  Reviewed patient's current orders and MAR    YES  PMH/SH reviewed - no change compared to H&P  ________________________________________________________________________  Care Plan discussed with:    Comments   Patient x    Family      RN x    Care Manager     Consultant                        Multidiciplinary team rounds were held today with , nursing, pharmacist and clinical coordinator. Patient's plan of care was discussed; medications were reviewed and discharge planning was addressed. ________________________________________________________________________  Total NON critical care TIME:  35  Minutes    Total CRITICAL CARE TIME Spent:   Minutes non procedure based      Comments   >50% of visit spent in counseling and coordination of care     ________________________________________________________________________  Chan Bush PA-C     Procedures: see electronic medical records for all procedures/Xrays and details which were not copied into this note but were reviewed prior to creation of Plan. LABS:  I reviewed today's most current labs and imaging studies.   Pertinent labs include:  Recent Labs     09/29/23  7085 10/01/23  0733   WBC 10.0 8.2   HGB 13.6 13.6   HCT 40.1 40.4    211       Recent Labs     09/29/23 2216 09/30/23  0700 10/01/23  0733    142 139   K 4.0 3.1* 3.5    107 106   CO2 25 28 28   BUN 33* 23* 29*   MG 2.3  --   --    PHOS  --  3.2 3.5   ALT 20  --   --    INR 0.9  --   --          Signed: Iraj Duarte PA-C

## 2023-10-02 ENCOUNTER — HOSPITAL ENCOUNTER (INPATIENT)
Facility: HOSPITAL | Age: 63
Discharge: HOME | End: 2023-10-04
Payer: COMMERCIAL

## 2023-10-02 ENCOUNTER — APPOINTMENT (OUTPATIENT)
Facility: HOSPITAL | Age: 63
End: 2023-10-02
Payer: COMMERCIAL

## 2023-10-02 VITALS
SYSTOLIC BLOOD PRESSURE: 191 MMHG | HEART RATE: 66 BPM | WEIGHT: 185 LBS | HEIGHT: 67 IN | BODY MASS INDEX: 29.03 KG/M2 | DIASTOLIC BLOOD PRESSURE: 74 MMHG

## 2023-10-02 LAB
APPEARANCE UR: CLEAR
BACTERIA URNS QL MICRO: NEGATIVE /HPF
BASOPHILS # BLD: 0.1 K/UL (ref 0–0.1)
BASOPHILS NFR BLD: 1 % (ref 0–1)
BILIRUB UR QL: NEGATIVE
COLOR UR: ABNORMAL
DIFFERENTIAL METHOD BLD: NORMAL
ECHO AO ROOT DIAM: 3 CM
ECHO AO ROOT INDEX: 1.53 CM/M2
ECHO AV AREA PEAK VELOCITY: 1.1 CM2
ECHO AV AREA VTI: 1.1 CM2
ECHO AV AREA/BSA PEAK VELOCITY: 0.6 CM2/M2
ECHO AV AREA/BSA VTI: 0.6 CM2/M2
ECHO AV MEAN GRADIENT: 11 MMHG
ECHO AV MEAN VELOCITY: 1.5 M/S
ECHO AV PEAK GRADIENT: 22 MMHG
ECHO AV PEAK VELOCITY: 2.3 M/S
ECHO AV VELOCITY RATIO: 0.39
ECHO AV VTI: 38.9 CM
ECHO BSA: 1.99 M2
ECHO BSA: 2.16 M2
ECHO EST RA PRESSURE: 3 MMHG
ECHO LA AREA 2C: 7.9 CM2
ECHO LA AREA 4C: 12.5 CM2
ECHO LA DIAMETER INDEX: 2.09 CM/M2
ECHO LA DIAMETER: 4.1 CM
ECHO LA MAJOR AXIS: 4 CM
ECHO LA MINOR AXIS: 4.2 CM
ECHO LA TO AORTIC ROOT RATIO: 1.37
ECHO LA VOL 2C: 12 ML (ref 22–52)
ECHO LA VOL 4C: 31 ML (ref 22–52)
ECHO LA VOL BP: 20 ML (ref 22–52)
ECHO LA VOL/BSA BIPLANE: 10 ML/M2 (ref 16–34)
ECHO LA VOLUME INDEX A2C: 6 ML/M2 (ref 16–34)
ECHO LA VOLUME INDEX A4C: 16 ML/M2 (ref 16–34)
ECHO LV E' LATERAL VELOCITY: 6 CM/S
ECHO LV E' SEPTAL VELOCITY: 13 CM/S
ECHO LV EDV A2C: 26 ML
ECHO LV EDV A4C: 57 ML
ECHO LV EDV INDEX A4C: 29 ML/M2
ECHO LV EDV NDEX A2C: 13 ML/M2
ECHO LV EJECTION FRACTION A2C: 73 %
ECHO LV EJECTION FRACTION A4C: 62 %
ECHO LV ESV A2C: 7 ML
ECHO LV ESV A4C: 21 ML
ECHO LV ESV INDEX A2C: 4 ML/M2
ECHO LV ESV INDEX A4C: 11 ML/M2
ECHO LV FRACTIONAL SHORTENING: 40 % (ref 28–44)
ECHO LV INTERNAL DIMENSION DIASTOLE INDEX: 2.14 CM/M2
ECHO LV INTERNAL DIMENSION DIASTOLIC: 4.2 CM (ref 3.9–5.3)
ECHO LV INTERNAL DIMENSION SYSTOLIC INDEX: 1.28 CM/M2
ECHO LV INTERNAL DIMENSION SYSTOLIC: 2.5 CM
ECHO LV IVSD: 1.2 CM (ref 0.6–0.9)
ECHO LV MASS 2D: 157.1 G (ref 67–162)
ECHO LV MASS INDEX 2D: 80.1 G/M2 (ref 43–95)
ECHO LV POSTERIOR WALL DIASTOLIC: 1 CM (ref 0.6–0.9)
ECHO LV RELATIVE WALL THICKNESS RATIO: 0.48
ECHO LVOT AREA: 2.8 CM2
ECHO LVOT AV VTI INDEX: 0.4
ECHO LVOT DIAM: 1.9 CM
ECHO LVOT MEAN GRADIENT: 2 MMHG
ECHO LVOT PEAK GRADIENT: 3 MMHG
ECHO LVOT PEAK VELOCITY: 0.9 M/S
ECHO LVOT STROKE VOLUME INDEX: 22.4 ML/M2
ECHO LVOT SV: 43.9 ML
ECHO LVOT VTI: 15.5 CM
ECHO MV A VELOCITY: 1.03 M/S
ECHO MV E VELOCITY: 0.55 M/S
ECHO MV E/A RATIO: 0.53
ECHO MV E/E' LATERAL: 9.17
ECHO MV E/E' RATIO (AVERAGED): 6.7
ECHO MV E/E' SEPTAL: 4.23
ECHO MV REGURGITANT PEAK GRADIENT: 8 MMHG
ECHO MV REGURGITANT PEAK VELOCITY: 1.4 M/S
ECHO MV REGURGITANT VTIA: 27.4 CM
ECHO PULMONARY ARTERY END DIASTOLIC PRESSURE: 4 MMHG
ECHO PV MAX VELOCITY: 1.4 M/S
ECHO PV MEAN GRADIENT: 3 MMHG
ECHO PV MEAN VELOCITY: 0.8 M/S
ECHO PV PEAK GRADIENT: 7 MMHG
ECHO PV REGURGITANT MAX VELOCITY: 1 M/S
ECHO PV VTI: 21 CM
ECHO RA AREA 4C: 8.7 CM2
ECHO RA END SYSTOLIC VOLUME APICAL 4 CHAMBER INDEX BSA: 8 ML/M2
ECHO RA VOLUME: 16 ML
ECHO RIGHT VENTRICULAR SYSTOLIC PRESSURE (RVSP): 11 MMHG
ECHO RV BASAL DIMENSION: 2.2 CM
ECHO RV FREE WALL PEAK S': 15 CM/S
ECHO RV MID DIMENSION: 1.6 CM
ECHO RV TAPSE: 2.1 CM (ref 1.7–?)
ECHO TV REGURGITANT MAX VELOCITY: 1.45 M/S
ECHO TV REGURGITANT PEAK GRADIENT: 8 MMHG
EOSINOPHIL # BLD: 0.3 K/UL (ref 0–0.4)
EOSINOPHIL NFR BLD: 4 % (ref 0–7)
EPITH CASTS URNS QL MICRO: ABNORMAL /LPF
ERYTHROCYTE [DISTWIDTH] IN BLOOD BY AUTOMATED COUNT: 13.6 % (ref 11.5–14.5)
GLUCOSE UR STRIP.AUTO-MCNC: 50 MG/DL
HCT VFR BLD AUTO: 41.6 % (ref 35–47)
HGB BLD-MCNC: 13.3 G/DL (ref 11.5–16)
HGB UR QL STRIP: ABNORMAL
HYALINE CASTS URNS QL MICRO: ABNORMAL /LPF (ref 0–5)
IMM GRANULOCYTES # BLD AUTO: 0 K/UL (ref 0–0.04)
IMM GRANULOCYTES NFR BLD AUTO: 0 % (ref 0–0.5)
KETONES UR QL STRIP.AUTO: NEGATIVE MG/DL
LEUKOCYTE ESTERASE UR QL STRIP.AUTO: NEGATIVE
LYMPHOCYTES # BLD: 3.1 K/UL (ref 0.8–3.5)
LYMPHOCYTES NFR BLD: 40 % (ref 12–49)
MCH RBC QN AUTO: 29.6 PG (ref 26–34)
MCHC RBC AUTO-ENTMCNC: 32 G/DL (ref 30–36.5)
MCV RBC AUTO: 92.4 FL (ref 80–99)
MONOCYTES # BLD: 0.6 K/UL (ref 0–1)
MONOCYTES NFR BLD: 8 % (ref 5–13)
MUCOUS THREADS URNS QL MICRO: ABNORMAL /LPF
NEUTS SEG # BLD: 3.7 K/UL (ref 1.8–8)
NEUTS SEG NFR BLD: 47 % (ref 32–75)
NITRITE UR QL STRIP.AUTO: NEGATIVE
NRBC # BLD: 0 K/UL (ref 0–0.01)
NRBC BLD-RTO: 0 PER 100 WBC
PH UR STRIP: 5 (ref 5–8)
PLATELET # BLD AUTO: 193 K/UL (ref 150–400)
PMV BLD AUTO: 12.7 FL (ref 8.9–12.9)
PROT UR STRIP-MCNC: NEGATIVE MG/DL
RBC # BLD AUTO: 4.5 M/UL (ref 3.8–5.2)
RBC #/AREA URNS HPF: ABNORMAL /HPF (ref 0–5)
SP GR UR REFRACTOMETRY: 1.02 (ref 1–1.03)
STRESS BASELINE DIAS BP: 74 MMHG
STRESS BASELINE HR: 66 BPM
STRESS BASELINE SYS BP: 191 MMHG
STRESS ST DEPRESSION: 0 MM
STRESS STAGE 1 BP: NORMAL MMHG
STRESS STAGE 1 DURATION: 1 MIN:SEC
STRESS STAGE 1 HR: 88 BPM
STRESS STAGE 2 DURATION: 2 MIN:SEC
STRESS STAGE 2 HR: 88 BPM
STRESS STAGE 3 BP: NORMAL MMHG
STRESS STAGE 3 DURATION: 3 MIN:SEC
STRESS STAGE 3 HR: 86 BPM
STRESS STAGE 4 BP: NORMAL MMHG
STRESS STAGE 4 DURATION: 4 MIN:SEC
STRESS STAGE 4 HR: 83 BPM
STRESS TARGET HR: 156 BPM
URINE CULTURE IF INDICATED: ABNORMAL
UROBILINOGEN UR QL STRIP.AUTO: 0.1 EU/DL (ref 0.1–1)
WBC # BLD AUTO: 7.9 K/UL (ref 3.6–11)
WBC URNS QL MICRO: ABNORMAL /HPF (ref 0–4)

## 2023-10-02 PROCEDURE — 81001 URINALYSIS AUTO W/SCOPE: CPT

## 2023-10-02 PROCEDURE — 87086 URINE CULTURE/COLONY COUNT: CPT

## 2023-10-02 PROCEDURE — 2580000003 HC RX 258: Performed by: HOSPITALIST

## 2023-10-02 PROCEDURE — 6370000000 HC RX 637 (ALT 250 FOR IP): Performed by: HOSPITALIST

## 2023-10-02 PROCEDURE — 2060000000 HC ICU INTERMEDIATE R&B

## 2023-10-02 PROCEDURE — 85025 COMPLETE CBC W/AUTO DIFF WBC: CPT

## 2023-10-02 PROCEDURE — 6360000002 HC RX W HCPCS

## 2023-10-02 PROCEDURE — 3430000000 HC RX DIAGNOSTIC RADIOPHARMACEUTICAL: Performed by: INTERNAL MEDICINE

## 2023-10-02 PROCEDURE — 78452 HT MUSCLE IMAGE SPECT MULT: CPT

## 2023-10-02 PROCEDURE — A9500 TC99M SESTAMIBI: HCPCS | Performed by: INTERNAL MEDICINE

## 2023-10-02 PROCEDURE — 36415 COLL VENOUS BLD VENIPUNCTURE: CPT

## 2023-10-02 RX ORDER — TETRAKIS(2-METHOXYISOBUTYLISOCYANIDE)COPPER(I) TETRAFLUOROBORATE 1 MG/ML
32.4 INJECTION, POWDER, LYOPHILIZED, FOR SOLUTION INTRAVENOUS
Status: COMPLETED | OUTPATIENT
Start: 2023-10-02 | End: 2023-10-02

## 2023-10-02 RX ORDER — TETRAKIS(2-METHOXYISOBUTYLISOCYANIDE)COPPER(I) TETRAFLUOROBORATE 1 MG/ML
10.11 INJECTION, POWDER, LYOPHILIZED, FOR SOLUTION INTRAVENOUS
Status: COMPLETED | OUTPATIENT
Start: 2023-10-02 | End: 2023-10-02

## 2023-10-02 RX ORDER — REGADENOSON 0.08 MG/ML
INJECTION, SOLUTION INTRAVENOUS
Status: COMPLETED
Start: 2023-10-02 | End: 2023-10-02

## 2023-10-02 RX ADMIN — SODIUM CHLORIDE, PRESERVATIVE FREE 10 ML: 5 INJECTION INTRAVENOUS at 13:07

## 2023-10-02 RX ADMIN — REGADENOSON 0.4 MG: 0.08 INJECTION, SOLUTION INTRAVENOUS at 10:43

## 2023-10-02 RX ADMIN — AMLODIPINE BESYLATE 5 MG: 5 TABLET ORAL at 21:53

## 2023-10-02 RX ADMIN — HYDROCHLOROTHIAZIDE 25 MG: 25 TABLET ORAL at 13:06

## 2023-10-02 RX ADMIN — TETRAKIS(2-METHOXYISOBUTYLISOCYANIDE)COPPER(I) TETRAFLUOROBORATE 10.11 MILLICURIE: 1 INJECTION, POWDER, LYOPHILIZED, FOR SOLUTION INTRAVENOUS at 09:10

## 2023-10-02 RX ADMIN — TETRAKIS(2-METHOXYISOBUTYLISOCYANIDE)COPPER(I) TETRAFLUOROBORATE 32.4 MILLICURIE: 1 INJECTION, POWDER, LYOPHILIZED, FOR SOLUTION INTRAVENOUS at 10:45

## 2023-10-02 RX ADMIN — SODIUM CHLORIDE, PRESERVATIVE FREE 10 ML: 5 INJECTION INTRAVENOUS at 21:54

## 2023-10-02 RX ADMIN — LOSARTAN POTASSIUM 50 MG: 50 TABLET, FILM COATED ORAL at 13:06

## 2023-10-02 NOTE — PLAN OF CARE
Problem: Discharge Planning  Goal: Discharge to home or other facility with appropriate resources  Outcome: Adequate for Discharge     Problem: Pain  Goal: Verbalizes/displays adequate comfort level or baseline comfort level  Outcome: Adequate for Discharge     Problem: Safety - Adult  Goal: Free from fall injury  Outcome: Progressing

## 2023-10-02 NOTE — PROGRESS NOTES
Hospitalist Progress Note    NAME:   Ketty Gonzales   : 1960   MRN: [id number]     Date/Time: 10/2/2023 2:33 PM  Patient PCP: SOPHIE Curtis NP    Estimated discharge date:> 48 hours  Barriers: cardiology clearance      Assessment / Plan:    Malignant hypertension  Blood pressure remains above goal, improved after morning meds  Continue amlodipine  Continue losartan  Continue hydrochlorothiazide  Echocardiogram: preserved EF, normal systolic function, grade 1 diastolic dysfunction, moderately calcified left right and noncoronary cusps of the aortic valve  Stress test with results pending     Brain aneurysm with  CTA of the head and neck revealed severe stenosis of the M2 on the right with moderate to severe stenosis of the basilar artery. There is post coiling of the anterior communicating artery aneurysm. There is aneurysmal dilatation of the ICA. Discussed case with telemetry neuro and patient will receive outpatient therapy with neuro interventional radiology. RASHAD  Stable with blood pressure management  Monitor for worsening renal function  Creatinine 1.47     Found sitting on the floor due to weakness of the lower extremities  Urinalysis with urine culture  Orthostatic blood pressure            Medical Decision Making:   I personally reviewed labs: CBC, UA, renal function panel  I personally reviewed imaging: Echo  Toxic drug monitoring: Antihypertensive medications, monitor for hypotension. 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 Patient was found to have a systolic blood pressure of approximately 250 and was started on a nicardipine drip.  She recently discontinued all of her medications stating

## 2023-10-02 NOTE — PROGRESS NOTES
CM reviewed medical record. Patient usually lives at home alone. Patient has not taken meds in over a week. CM will investigate why patient did not take meds.  Patient is NPO today for stress test.

## 2023-10-03 VITALS
HEART RATE: 83 BPM | OXYGEN SATURATION: 97 % | SYSTOLIC BLOOD PRESSURE: 154 MMHG | WEIGHT: 183.9 LBS | TEMPERATURE: 97.9 F | DIASTOLIC BLOOD PRESSURE: 73 MMHG | HEIGHT: 67 IN | BODY MASS INDEX: 28.87 KG/M2 | RESPIRATION RATE: 18 BRPM

## 2023-10-03 LAB
ALBUMIN SERPL-MCNC: 3.4 G/DL (ref 3.5–5)
ANION GAP SERPL CALC-SCNC: 8 MMOL/L (ref 5–15)
BACTERIA SPEC CULT: NORMAL
BASOPHILS # BLD: 0.1 K/UL (ref 0–0.1)
BASOPHILS NFR BLD: 1 % (ref 0–1)
BUN SERPL-MCNC: 32 MG/DL (ref 6–20)
BUN/CREAT SERPL: 22 (ref 12–20)
CA-I BLD-MCNC: 9.3 MG/DL (ref 8.5–10.1)
CHLORIDE SERPL-SCNC: 105 MMOL/L (ref 97–108)
CO2 SERPL-SCNC: 26 MMOL/L (ref 21–32)
COLONY COUNT, CNT: NORMAL
CREAT SERPL-MCNC: 1.45 MG/DL (ref 0.55–1.02)
DIFFERENTIAL METHOD BLD: NORMAL
ECHO BSA: 2.16 M2
EOSINOPHIL # BLD: 0.3 K/UL (ref 0–0.4)
EOSINOPHIL NFR BLD: 4 % (ref 0–7)
ERYTHROCYTE [DISTWIDTH] IN BLOOD BY AUTOMATED COUNT: 13.2 % (ref 11.5–14.5)
GLUCOSE SERPL-MCNC: 126 MG/DL (ref 65–100)
HCT VFR BLD AUTO: 38.8 % (ref 35–47)
HGB BLD-MCNC: 13.1 G/DL (ref 11.5–16)
IMM GRANULOCYTES # BLD AUTO: 0 K/UL (ref 0–0.04)
IMM GRANULOCYTES NFR BLD AUTO: 0 % (ref 0–0.5)
LYMPHOCYTES # BLD: 3.1 K/UL (ref 0.8–3.5)
LYMPHOCYTES NFR BLD: 39 % (ref 12–49)
Lab: NORMAL
MAGNESIUM SERPL-MCNC: 2.1 MG/DL (ref 1.6–2.4)
MCH RBC QN AUTO: 29.2 PG (ref 26–34)
MCHC RBC AUTO-ENTMCNC: 33.8 G/DL (ref 30–36.5)
MCV RBC AUTO: 86.4 FL (ref 80–99)
MONOCYTES # BLD: 0.7 K/UL (ref 0–1)
MONOCYTES NFR BLD: 8 % (ref 5–13)
NEUTS SEG # BLD: 3.8 K/UL (ref 1.8–8)
NEUTS SEG NFR BLD: 48 % (ref 32–75)
NRBC # BLD: 0 K/UL (ref 0–0.01)
NRBC BLD-RTO: 0 PER 100 WBC
PHOSPHATE SERPL-MCNC: 3.2 MG/DL (ref 2.6–4.7)
PLATELET # BLD AUTO: 222 K/UL (ref 150–400)
PMV BLD AUTO: 12.5 FL (ref 8.9–12.9)
POTASSIUM SERPL-SCNC: 2.7 MMOL/L (ref 3.5–5.1)
RBC # BLD AUTO: 4.49 M/UL (ref 3.8–5.2)
SODIUM SERPL-SCNC: 139 MMOL/L (ref 136–145)
VAS AAA AP: 3.26 CM
VAS AAA LENGTH: 1.64 CM
VAS AAA TRANS: 3.77 CM
VAS AORTA MID AP: 3.26 CM
VAS AORTA MID PSV: 45.3 CM/S
VAS AORTA MID TRANS: 3.77 CM
VAS AORTA PROX AP: 2.18 CM
VAS AORTA PROX TR: 2.63 CM
VAS L RENAL ORIG RI: 0.75
VAS LEFT KIDNEY LENGTH: 10.62 CM
VAS LEFT RENAL DIST EDV: 25.3 CM/S
VAS LEFT RENAL DIST PSV: 75.1 CM/S
VAS LEFT RENAL DIST RAR: 1.66
VAS LEFT RENAL DIST RI: 0.66
VAS LEFT RENAL MID EDV: 18.8 CM/S
VAS LEFT RENAL MID PSV: 95.2 CM/S
VAS LEFT RENAL MID RAR: 2.1
VAS LEFT RENAL MID RI: 0.8
VAS LEFT RENAL ORIGIN EDV: 30.7 CM/S
VAS LEFT RENAL ORIGIN PSV: 122 CM/S
VAS LEFT RENAL ORIGIN RAR: 2.69
VAS LEFT RENAL PROX EDV: 41.7 CM/S
VAS LEFT RENAL PROX PSV: 166 CM/S
VAS LEFT RENAL PROX RAR: 3.66
VAS LEFT RENAL PROX RI: 0.75
VAS LEFT RENAL RAR: 3.66
VAS LEFT SEGMENTAL RENAL ARTERY EDV: 6.8 CM/S
VAS LEFT SEGMENTAL RENAL ARTERY EDV: 6.8 CM/S
VAS LEFT SEGMENTAL RENAL ARTERY EDV: 8.6 CM/S
VAS LEFT SEGMENTAL RENAL ARTERY PSV: 19.7 CM/S
VAS LEFT SEGMENTAL RENAL ARTERY PSV: 20.9 CM/S
VAS LEFT SEGMENTAL RENAL ARTERY PSV: 27 CM/S
VAS RIGHT KIDNEY LENGTH: 8.86 CM
VAS RIGHT RENAL DIST EDV: 24.6 CM/S
VAS RIGHT RENAL DIST PSV: 98.5 CM/S
VAS RIGHT RENAL DIST RAR: 2.17
VAS RIGHT RENAL DIST RI: 0.75
VAS RIGHT RENAL MID EDV: 45.9 CM/S
VAS RIGHT RENAL MID PSV: 161 CM/S
VAS RIGHT RENAL MID RAR: 3.55
VAS RIGHT RENAL MID RI: 0.71
VAS RIGHT RENAL ORIGIN EDV: 37.9 CM/S
VAS RIGHT RENAL ORIGIN PSV: 141 CM/S
VAS RIGHT RENAL ORIGIN RAR: 3.11
VAS RIGHT RENAL ORIGIN RI: 0.73
VAS RIGHT RENAL PROX EDV: 37.2 CM/S
VAS RIGHT RENAL PROX PSV: 138 CM/S
VAS RIGHT RENAL PROX RAR: 3.05
VAS RIGHT RENAL PROX RI: 0.73
VAS RIGHT RENAL RAR: 3.55
VAS RIGHT SEGMENTAL RENAL ARTERY EDV: 10.3 CM/S
VAS RIGHT SEGMENTAL RENAL ARTERY EDV: 11.4 CM/S
VAS RIGHT SEGMENTAL RENAL ARTERY EDV: 9 CM/S
VAS RIGHT SEGMENTAL RENAL ARTERY PSV: 31.7 CM/S
VAS RIGHT SEGMENTAL RENAL ARTERY PSV: 33 CM/S
VAS RIGHT SEGMENTAL RENAL ARTERY PSV: 35.8 CM/S
WBC # BLD AUTO: 8 K/UL (ref 3.6–11)

## 2023-10-03 PROCEDURE — 36415 COLL VENOUS BLD VENIPUNCTURE: CPT

## 2023-10-03 PROCEDURE — 84244 ASSAY OF RENIN: CPT

## 2023-10-03 PROCEDURE — 6360000002 HC RX W HCPCS: Performed by: HOSPITALIST

## 2023-10-03 PROCEDURE — 6370000000 HC RX 637 (ALT 250 FOR IP): Performed by: HOSPITALIST

## 2023-10-03 PROCEDURE — 83735 ASSAY OF MAGNESIUM: CPT

## 2023-10-03 PROCEDURE — 80069 RENAL FUNCTION PANEL: CPT

## 2023-10-03 PROCEDURE — 6370000000 HC RX 637 (ALT 250 FOR IP): Performed by: NURSE PRACTITIONER

## 2023-10-03 PROCEDURE — 85025 COMPLETE CBC W/AUTO DIFF WBC: CPT

## 2023-10-03 PROCEDURE — 93975 VASCULAR STUDY: CPT | Performed by: SURGERY

## 2023-10-03 PROCEDURE — 2580000003 HC RX 258: Performed by: HOSPITALIST

## 2023-10-03 RX ORDER — LOSARTAN POTASSIUM 50 MG/1
50 TABLET ORAL DAILY
Qty: 30 TABLET | Refills: 3 | Status: SHIPPED | OUTPATIENT
Start: 2023-10-04

## 2023-10-03 RX ORDER — POTASSIUM CHLORIDE 20 MEQ/1
40 TABLET, EXTENDED RELEASE ORAL EVERY 4 HOURS
Status: COMPLETED | OUTPATIENT
Start: 2023-10-03 | End: 2023-10-03

## 2023-10-03 RX ORDER — NICOTINE 21 MG/24HR
1 PATCH, TRANSDERMAL 24 HOURS TRANSDERMAL DAILY
Qty: 30 PATCH | Refills: 3 | Status: SHIPPED | OUTPATIENT
Start: 2023-10-04

## 2023-10-03 RX ORDER — POTASSIUM CHLORIDE 20 MEQ/1
40 TABLET, EXTENDED RELEASE ORAL 2 TIMES DAILY
Status: DISCONTINUED | OUTPATIENT
Start: 2023-10-03 | End: 2023-10-03 | Stop reason: HOSPADM

## 2023-10-03 RX ORDER — HYDRALAZINE HYDROCHLORIDE 20 MG/ML
10 INJECTION INTRAMUSCULAR; INTRAVENOUS EVERY 4 HOURS PRN
Status: DISCONTINUED | OUTPATIENT
Start: 2023-10-03 | End: 2023-10-03 | Stop reason: HOSPADM

## 2023-10-03 RX ADMIN — POTASSIUM CHLORIDE 40 MEQ: 1500 TABLET, EXTENDED RELEASE ORAL at 06:54

## 2023-10-03 RX ADMIN — POTASSIUM CHLORIDE 40 MEQ: 1500 TABLET, EXTENDED RELEASE ORAL at 10:08

## 2023-10-03 RX ADMIN — HYDRALAZINE HYDROCHLORIDE 10 MG: 20 INJECTION, SOLUTION INTRAMUSCULAR; INTRAVENOUS at 01:25

## 2023-10-03 RX ADMIN — POTASSIUM CHLORIDE 40 MEQ: 1500 TABLET, EXTENDED RELEASE ORAL at 13:00

## 2023-10-03 RX ADMIN — LOSARTAN POTASSIUM 50 MG: 50 TABLET, FILM COATED ORAL at 10:08

## 2023-10-03 RX ADMIN — SODIUM CHLORIDE, PRESERVATIVE FREE 10 ML: 5 INJECTION INTRAVENOUS at 10:09

## 2023-10-03 RX ADMIN — HYDROCHLOROTHIAZIDE 25 MG: 25 TABLET ORAL at 10:08

## 2023-10-03 NOTE — PROGRESS NOTES
membranes  Neck: Supple. Chest: Lungs clear to auscultation bilaterally. Cardiovascular: Regular rate and rhythm, S1S2 normal, 2/6 systolic murmur. Abdomen: Soft, non-tender, bowel sounds are active. Extremities: No edema bilaterally. Skin: Warm and dry. []         Post-cath site without hematoma, bruit, tenderness, or thrill. Distal pulses intact. PMH/SH reviewed - no change compared to H&P    Data Review    Telemetry:     EKG:   []  No new EKG for review    Lab Data Personally Reviewed:    Recent Labs     10/02/23  0543 10/03/23  0524   WBC 7.9 8.0   HGB 13.3 13.1   HCT 41.6 38.8    222     No results for input(s): \"INR\", \"APTT\" in the last 72 hours. Invalid input(s): \"PTP\"   Recent Labs     10/01/23  0733 10/03/23  0524    139   K 3.5 2.7*    105   CO2 28 26   BUN 29* 32*     No results for input(s): \"CPK\" in the last 72 hours. Invalid input(s): \"CPKMB\", \"CKNDX\", \"TROIQ\"  No results found for: \"CHOL\", \"CHOLX\", \"CHLST\", \"CHOLV\", \"HDL\", \"HDLC\", \"LDL\", \"LDLC\", \"TGLX\", \"TRIGL\"    No results for input(s): \"TP\", \"ALB\", \"GLOB\", \"GGT\", \"AML\" in the last 72 hours. Invalid input(s): \"SGOT\", \"GPT\", \"AP\", \"TBIL\", \"AMYP\", \"LPSE\", \"HLPSE\"  No results for input(s): \"PH\", \"PCO2\", \"PO2\" in the last 72 hours.     Medications Personally Reviewed:    Current Facility-Administered Medications   Medication Dose Route Frequency    hydrALAZINE (APRESOLINE) injection 10 mg  10 mg IntraVENous Q4H PRN    potassium chloride (KLOR-CON M) extended release tablet 40 mEq  40 mEq Oral BID    hydroCHLOROthiazide (HYDRODIURIL) tablet 25 mg  25 mg Oral Daily    amLODIPine (NORVASC) tablet 5 mg  5 mg Oral Nightly    sodium chloride flush 0.9 % injection 5-40 mL  5-40 mL IntraVENous 2 times per day    sodium chloride flush 0.9 % injection 5-40 mL  5-40 mL IntraVENous PRN    0.9 % sodium chloride infusion   IntraVENous PRN    ondansetron (ZOFRAN-ODT) disintegrating tablet 4 mg  4 mg Oral Q8H PRN    Or ondansetron (ZOFRAN) injection 4 mg  4 mg IntraVENous Q6H PRN    acetaminophen (TYLENOL) tablet 650 mg  650 mg Oral Q6H PRN    Or    acetaminophen (TYLENOL) suppository 650 mg  650 mg Rectal Q6H PRN    nicotine (NICODERM CQ) 21 MG/24HR 1 patch  1 patch TransDERmal Daily    losartan (COZAAR) tablet 50 mg  50 mg Oral Daily         Abiodun Leija MD

## 2023-10-03 NOTE — CONSULTS
Nutrition Education    Educated on Heart Healthy Diet in context of Vegan/Vegetarian diet  Learners: Patient  Readiness: Acceptance  Method: Explanation and Teachback  Response: Verbalizes Understanding  Contact name and number provided. NO    RD consult requested by patient. Patient reported she switched to vegan diet at admit to try and help heal her. Pt noted to request non-vegan items (fish, eggs) as well. Pt relayed she is following the teachings of Dr. Lorena Davidson, \"he's not a real doctor\" and that \"he cured cancer and HIV with a vegan diet\", also confirmed that she was not diagnosed with either of those conditions. RD educated pt on AHA recommendations for heart health including a low sodium, high fiber diet rich in poly- and mono-unsaturated fats and low in saturated fats. RD helped pt come up with examples of foods in all of these categories. Further educated pt on vegan sources of protein including beans, lentils, nuts, seeds, and soy products.      VAISHALI Gomes  Contact Number: Ext [phone], or via [employer]

## 2023-10-03 NOTE — PROGRESS NOTES
CM reviewed medical record. Patient DCP is to return home alone. Family requesting to speak with CM. CM called and spoke to daughter Suhail Anderson at [phone]. Daughter would like CM to arrange [facility] services at discharge. Choice letter completed via telephone and referrals sent. Per hospitalist, patient will be discharged today. Transition of Care Plan:    RUR: 8%  Prior Level of Functioning: independent  Disposition: home alone with HH  If SNF or IPR: Date FOC offered:n/a   Date FOC received: n/a  Accepting facility: n/a  Date authorization started with reference number: n/a  Date authorization received and expires: n/a  Follow up appointments: yes  DME needed: n/a  Transportation at discharge: family  IM/IMM Medicare/ letter given: n/a  Is patient a [place] and connected with VA? N/a   If yes, was [employer] transfer form completed and VA notified? Caregiver Contact: [facility]  Discharge Caregiver contacted prior to discharge? yes  Care Conference needed? no  Barriers to discharge: no    1340 pm  Patient accepted with All about care [facility] services.  Anticipated start of care is 10/4/23

## 2023-10-03 NOTE — PROGRESS NOTES
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 Patient was found to have a systolic blood pressure of approximately 250 and was started on a nicardipine drip. She recently discontinued all of her medications stating she no longer needed them. She was started on amlodipine, losartan and hydrochlorothiazide and she is now off the nicardipine drip. Transferred out of the ICU. Cardiac consultation and requesting a stress test and renal artery duplex to further evaluate for secondary hypertension. 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 There is post coiling of the anterior communicating artery aneurysm. There is aneurysmal dilatation of the ICA. At time of admission telemetry neuro was made aware and recommending outpatient evaluation with neuro interventional radiologist.  Patient for nuclear medicine stress test today, results pending. Blood pressure has improved we will continue to monitor. Nuclear medicine stress test normal perfusion study. Patient is stable for discharge.        Brief Hospital Course by Main Problems:     Malignant hypertension: Blood pressure Discharge Medications:     Medication List        START taking these medications      losartan 50 MG tablet  Commonly known as: COZAAR  Take 1 tablet by mouth daily  Start taking on: October 4, 2023     nicotine 21 MG/24HR  Commonly known as: [address] [facility] 1 patch onto the skin daily  Start taking on: October 4, 2023            Aneita Cease taking these medications      amLODIPine 10 MG tablet  Commonly known as: NORVASC            STOP taking these medications      hydroCHLOROthiazide 25 MG tablet  Commonly known as: HYDRODIURIL               Where to Get Your Medications        These medications were sent to [facility], [address] [facility] [phone] [facility] [phone]  [address], [address] [place] [phone]      Phone: [phone]   losartan 50 MG tablet  nicotine 21 MG/24HR             DISPOSITION:    Home with Family:       Home with HH/PT/OT/RN: x   SNF/LTC:    KEMAR:    OTHER:            Code status: full   Recommended diet: cardiac diet  Recommended activity: activity as tolerated  Wound care: None      Follow up with:   PCP : SOPHIE Sky NP, APRN - NP  [address]  [phone]    Follow up  As needed    Librado Brown MD  [address] [facility]  [phone]    Follow up  Please call and schedule an appointment in 2-3 weeks for medications management.           Total time in minutes spent coordinating this discharge (includes going over instructions, follow-up, prescriptions, and preparing report for sign off to her PCP) :  35 minutes

## 2023-10-03 NOTE — PLAN OF CARE
Problem: Discharge Planning  Goal: Discharge to home or other facility with appropriate resources  Outcome: Progressing     Problem: Pain  Goal: Verbalizes/displays adequate comfort level or baseline comfort level  Outcome: Progressing  Flowsheets (Taken 10/2/2023 2031)  Verbalizes/displays adequate comfort level or baseline comfort level: Encourage patient to monitor pain and request assistance     Problem: Safety - Adult  Goal: Free from fall injury  Outcome: Progressing

## 2023-10-08 LAB — ALDOST SERPL-MCNC: 20.9 NG/DL (ref 0–30)

## 2023-10-16 ENCOUNTER — TRANSCRIBE ORDERS (OUTPATIENT)
Facility: HOSPITAL | Age: 63
End: 2023-10-16

## 2023-10-16 DIAGNOSIS — Z12.31 SCREENING MAMMOGRAM FOR BREAST CANCER: Primary | ICD-10-CM

## 2024-02-15 ENCOUNTER — APPOINTMENT (OUTPATIENT)
Facility: HOSPITAL | Age: 64
DRG: 045 | End: 2024-02-15
Payer: COMMERCIAL

## 2024-02-15 ENCOUNTER — HOSPITAL ENCOUNTER (INPATIENT)
Facility: HOSPITAL | Age: 64
LOS: 5 days | Discharge: NURSING, REHAB OR LONG-TERM CARE | DRG: 045 | End: 2024-02-20
Attending: STUDENT IN AN ORGANIZED HEALTH CARE EDUCATION/TRAINING PROGRAM | Admitting: INTERNAL MEDICINE
Payer: COMMERCIAL

## 2024-02-15 DIAGNOSIS — R29.90 STROKE-LIKE SYMPTOMS: Primary | ICD-10-CM

## 2024-02-15 DIAGNOSIS — I63.9 ISCHEMIC STROKE (HCC): ICD-10-CM

## 2024-02-15 DIAGNOSIS — E87.6 HYPOKALEMIA: ICD-10-CM

## 2024-02-15 LAB
ALBUMIN SERPL-MCNC: 3.8 G/DL (ref 3.5–5)
ALBUMIN/GLOB SERPL: 1.1 (ref 1.1–2.2)
ALP SERPL-CCNC: 98 U/L (ref 45–117)
ALT SERPL-CCNC: 27 U/L (ref 12–78)
ANION GAP SERPL CALC-SCNC: 7 MMOL/L (ref 5–15)
AST SERPL W P-5'-P-CCNC: 39 U/L (ref 15–37)
BASOPHILS # BLD: 0.1 K/UL (ref 0–0.1)
BASOPHILS NFR BLD: 1 % (ref 0–1)
BILIRUB SERPL-MCNC: 0.5 MG/DL (ref 0.2–1)
BUN SERPL-MCNC: 28 MG/DL (ref 6–20)
BUN/CREAT SERPL: 16 (ref 12–20)
CA-I BLD-MCNC: 9 MG/DL (ref 8.5–10.1)
CHLORIDE SERPL-SCNC: 100 MMOL/L (ref 97–108)
CHOLEST SERPL-MCNC: 247 MG/DL
CK SERPL-CCNC: 872 U/L (ref 26–192)
CO2 SERPL-SCNC: 31 MMOL/L (ref 21–32)
CREAT SERPL-MCNC: 1.74 MG/DL (ref 0.55–1.02)
DIFFERENTIAL METHOD BLD: ABNORMAL
EKG DIAGNOSIS: NORMAL
EKG DIAGNOSIS: NORMAL
EKG Q-T INTERVAL: 428 MS
EKG Q-T INTERVAL: 446 MS
EKG QRS DURATION: 92 MS
EKG QRS DURATION: 96 MS
EKG QTC CALCULATION (BAZETT): 428 MS
EKG QTC CALCULATION (BAZETT): 460 MS
EKG R AXIS: 20 DEGREES
EKG R AXIS: 24 DEGREES
EKG T AXIS: 124 DEGREES
EKG T AXIS: 125 DEGREES
EKG VENTRICULAR RATE: 60 BPM
EKG VENTRICULAR RATE: 64 BPM
EOSINOPHIL # BLD: 0.5 K/UL (ref 0–0.4)
EOSINOPHIL NFR BLD: 4 % (ref 0–7)
ERYTHROCYTE [DISTWIDTH] IN BLOOD BY AUTOMATED COUNT: 12.5 % (ref 11.5–14.5)
EST. AVERAGE GLUCOSE BLD GHB EST-MCNC: 148 MG/DL
FLUAV AG NPH QL IA: NEGATIVE
FLUBV AG NOSE QL IA: NEGATIVE
GLOBULIN SER CALC-MCNC: 3.6 G/DL (ref 2–4)
GLUCOSE SERPL-MCNC: 126 MG/DL (ref 65–100)
HBA1C MFR BLD: 6.8 % (ref 4–5.6)
HCT VFR BLD AUTO: 39.1 % (ref 35–47)
HDLC SERPL-MCNC: 44 MG/DL
HDLC SERPL: 5.6 (ref 0–5)
HGB BLD-MCNC: 13.3 G/DL (ref 11.5–16)
IMM GRANULOCYTES # BLD AUTO: 0 K/UL (ref 0–0.04)
IMM GRANULOCYTES NFR BLD AUTO: 0 % (ref 0–0.5)
INR PPP: 1 (ref 0.9–1.1)
LDLC SERPL CALC-MCNC: 148.2 MG/DL (ref 0–100)
LIPASE SERPL-CCNC: 18 U/L (ref 13–75)
LIPID PANEL: ABNORMAL
LYMPHOCYTES # BLD: 5.2 K/UL (ref 0.8–3.5)
LYMPHOCYTES NFR BLD: 44 % (ref 12–49)
MAGNESIUM SERPL-MCNC: 1.8 MG/DL (ref 1.6–2.4)
MCH RBC QN AUTO: 30.5 PG (ref 26–34)
MCHC RBC AUTO-ENTMCNC: 34 G/DL (ref 30–36.5)
MCV RBC AUTO: 89.7 FL (ref 80–99)
MONOCYTES # BLD: 0.9 K/UL (ref 0–1)
MONOCYTES NFR BLD: 8 % (ref 5–13)
NEUTS SEG # BLD: 5 K/UL (ref 1.8–8)
NEUTS SEG NFR BLD: 43 % (ref 32–75)
NRBC # BLD: 0 K/UL (ref 0–0.01)
NRBC BLD-RTO: 0 PER 100 WBC
PLATELET # BLD AUTO: 262 K/UL (ref 150–400)
PMV BLD AUTO: 12.1 FL (ref 8.9–12.9)
POTASSIUM SERPL-SCNC: 3.1 MMOL/L (ref 3.5–5.1)
PROT SERPL-MCNC: 7.4 G/DL (ref 6.4–8.2)
PROTHROMBIN TIME: 13.3 SEC (ref 11.9–14.6)
RBC # BLD AUTO: 4.36 M/UL (ref 3.8–5.2)
SARS-COV-2 RDRP RESP QL NAA+PROBE: NOT DETECTED
SODIUM SERPL-SCNC: 138 MMOL/L (ref 136–145)
TRIGL SERPL-MCNC: 274 MG/DL
VLDLC SERPL CALC-MCNC: 54.8 MG/DL
WBC # BLD AUTO: 11.6 K/UL (ref 3.6–11)

## 2024-02-15 PROCEDURE — 93005 ELECTROCARDIOGRAM TRACING: CPT | Performed by: STUDENT IN AN ORGANIZED HEALTH CARE EDUCATION/TRAINING PROGRAM

## 2024-02-15 PROCEDURE — 70551 MRI BRAIN STEM W/O DYE: CPT

## 2024-02-15 PROCEDURE — 2580000003 HC RX 258

## 2024-02-15 PROCEDURE — 80053 COMPREHEN METABOLIC PANEL: CPT

## 2024-02-15 PROCEDURE — 85025 COMPLETE CBC W/AUTO DIFF WBC: CPT

## 2024-02-15 PROCEDURE — 83036 HEMOGLOBIN GLYCOSYLATED A1C: CPT

## 2024-02-15 PROCEDURE — 96365 THER/PROPH/DIAG IV INF INIT: CPT

## 2024-02-15 PROCEDURE — 1100000000 HC RM PRIVATE

## 2024-02-15 PROCEDURE — 96376 TX/PRO/DX INJ SAME DRUG ADON: CPT

## 2024-02-15 PROCEDURE — 4A03X5D MEASUREMENT OF ARTERIAL FLOW, INTRACRANIAL, EXTERNAL APPROACH: ICD-10-PCS | Performed by: INTERNAL MEDICINE

## 2024-02-15 PROCEDURE — G0426 INPT/ED TELECONSULT50: HCPCS | Performed by: PSYCHIATRY & NEUROLOGY

## 2024-02-15 PROCEDURE — 6370000000 HC RX 637 (ALT 250 FOR IP): Performed by: STUDENT IN AN ORGANIZED HEALTH CARE EDUCATION/TRAINING PROGRAM

## 2024-02-15 PROCEDURE — 87635 SARS-COV-2 COVID-19 AMP PRB: CPT

## 2024-02-15 PROCEDURE — 99285 EMERGENCY DEPT VISIT HI MDM: CPT

## 2024-02-15 PROCEDURE — 6370000000 HC RX 637 (ALT 250 FOR IP): Performed by: INTERNAL MEDICINE

## 2024-02-15 PROCEDURE — 87804 INFLUENZA ASSAY W/OPTIC: CPT

## 2024-02-15 PROCEDURE — 96375 TX/PRO/DX INJ NEW DRUG ADDON: CPT

## 2024-02-15 PROCEDURE — 70498 CT ANGIOGRAPHY NECK: CPT

## 2024-02-15 PROCEDURE — 6360000002 HC RX W HCPCS: Performed by: STUDENT IN AN ORGANIZED HEALTH CARE EDUCATION/TRAINING PROGRAM

## 2024-02-15 PROCEDURE — 83735 ASSAY OF MAGNESIUM: CPT

## 2024-02-15 PROCEDURE — 2580000003 HC RX 258: Performed by: INTERNAL MEDICINE

## 2024-02-15 PROCEDURE — 82550 ASSAY OF CK (CPK): CPT

## 2024-02-15 PROCEDURE — 6360000004 HC RX CONTRAST MEDICATION: Performed by: STUDENT IN AN ORGANIZED HEALTH CARE EDUCATION/TRAINING PROGRAM

## 2024-02-15 PROCEDURE — 93005 ELECTROCARDIOGRAM TRACING: CPT | Performed by: INTERNAL MEDICINE

## 2024-02-15 PROCEDURE — 6360000002 HC RX W HCPCS: Performed by: INTERNAL MEDICINE

## 2024-02-15 PROCEDURE — 6370000000 HC RX 637 (ALT 250 FOR IP)

## 2024-02-15 PROCEDURE — 80061 LIPID PANEL: CPT

## 2024-02-15 PROCEDURE — 85610 PROTHROMBIN TIME: CPT

## 2024-02-15 PROCEDURE — 83690 ASSAY OF LIPASE: CPT

## 2024-02-15 RX ORDER — POTASSIUM CHLORIDE 20 MEQ/1
40 TABLET, EXTENDED RELEASE ORAL ONCE
Status: COMPLETED | OUTPATIENT
Start: 2024-02-15 | End: 2024-02-15

## 2024-02-15 RX ORDER — ONDANSETRON 4 MG/1
4 TABLET, ORALLY DISINTEGRATING ORAL EVERY 8 HOURS PRN
Status: DISCONTINUED | OUTPATIENT
Start: 2024-02-15 | End: 2024-02-20 | Stop reason: HOSPADM

## 2024-02-15 RX ORDER — AMLODIPINE BESYLATE 5 MG/1
10 TABLET ORAL DAILY
Status: DISCONTINUED | OUTPATIENT
Start: 2024-02-15 | End: 2024-02-15

## 2024-02-15 RX ORDER — LABETALOL HYDROCHLORIDE 5 MG/ML
10 INJECTION, SOLUTION INTRAVENOUS EVERY 10 MIN PRN
Status: DISCONTINUED | OUTPATIENT
Start: 2024-02-15 | End: 2024-02-20 | Stop reason: HOSPADM

## 2024-02-15 RX ORDER — SODIUM CHLORIDE 9 MG/ML
INJECTION, SOLUTION INTRAVENOUS CONTINUOUS
Status: DISPENSED | OUTPATIENT
Start: 2024-02-15 | End: 2024-02-16

## 2024-02-15 RX ORDER — ASPIRIN 81 MG/1
81 TABLET, CHEWABLE ORAL
Status: COMPLETED | OUTPATIENT
Start: 2024-02-15 | End: 2024-02-15

## 2024-02-15 RX ORDER — LOSARTAN POTASSIUM 50 MG/1
50 TABLET ORAL DAILY
Status: CANCELLED | OUTPATIENT
Start: 2024-02-15

## 2024-02-15 RX ORDER — ONDANSETRON 2 MG/ML
4 INJECTION INTRAMUSCULAR; INTRAVENOUS ONCE
Status: COMPLETED | OUTPATIENT
Start: 2024-02-15 | End: 2024-02-15

## 2024-02-15 RX ORDER — AMLODIPINE BESYLATE 5 MG/1
10 TABLET ORAL DAILY
Status: DISCONTINUED | OUTPATIENT
Start: 2024-02-16 | End: 2024-02-20 | Stop reason: HOSPADM

## 2024-02-15 RX ORDER — LOSARTAN POTASSIUM 50 MG/1
50 TABLET ORAL ONCE
Status: COMPLETED | OUTPATIENT
Start: 2024-02-15 | End: 2024-02-15

## 2024-02-15 RX ORDER — LOSARTAN POTASSIUM 100 MG/1
100 TABLET ORAL DAILY
COMMUNITY
Start: 2024-01-31

## 2024-02-15 RX ORDER — POTASSIUM CHLORIDE 7.45 MG/ML
10 INJECTION INTRAVENOUS
Status: COMPLETED | OUTPATIENT
Start: 2024-02-15 | End: 2024-02-15

## 2024-02-15 RX ORDER — ENOXAPARIN SODIUM 100 MG/ML
40 INJECTION SUBCUTANEOUS DAILY
Status: DISCONTINUED | OUTPATIENT
Start: 2024-02-15 | End: 2024-02-20 | Stop reason: HOSPADM

## 2024-02-15 RX ORDER — ACETAMINOPHEN 500 MG
1000 TABLET ORAL ONCE
Status: COMPLETED | OUTPATIENT
Start: 2024-02-15 | End: 2024-02-15

## 2024-02-15 RX ORDER — SODIUM CHLORIDE 0.9 % (FLUSH) 0.9 %
5-40 SYRINGE (ML) INJECTION PRN
Status: DISCONTINUED | OUTPATIENT
Start: 2024-02-15 | End: 2024-02-20 | Stop reason: HOSPADM

## 2024-02-15 RX ORDER — SODIUM CHLORIDE 0.9 % (FLUSH) 0.9 %
5-40 SYRINGE (ML) INJECTION EVERY 12 HOURS SCHEDULED
Status: DISCONTINUED | OUTPATIENT
Start: 2024-02-15 | End: 2024-02-20 | Stop reason: HOSPADM

## 2024-02-15 RX ORDER — SODIUM CHLORIDE 9 MG/ML
INJECTION, SOLUTION INTRAVENOUS CONTINUOUS
Status: DISCONTINUED | OUTPATIENT
Start: 2024-02-15 | End: 2024-02-15

## 2024-02-15 RX ORDER — SODIUM CHLORIDE 9 MG/ML
INJECTION, SOLUTION INTRAVENOUS PRN
Status: DISCONTINUED | OUTPATIENT
Start: 2024-02-15 | End: 2024-02-20 | Stop reason: HOSPADM

## 2024-02-15 RX ORDER — HYDRALAZINE HYDROCHLORIDE 25 MG/1
25 TABLET, FILM COATED ORAL EVERY 8 HOURS SCHEDULED
Status: DISCONTINUED | OUTPATIENT
Start: 2024-02-15 | End: 2024-02-20 | Stop reason: HOSPADM

## 2024-02-15 RX ORDER — NICOTINE 21 MG/24HR
1 PATCH, TRANSDERMAL 24 HOURS TRANSDERMAL DAILY
Status: DISCONTINUED | OUTPATIENT
Start: 2024-02-15 | End: 2024-02-20 | Stop reason: HOSPADM

## 2024-02-15 RX ORDER — HYDRALAZINE HYDROCHLORIDE 20 MG/ML
10 INJECTION INTRAMUSCULAR; INTRAVENOUS EVERY 4 HOURS PRN
Status: DISCONTINUED | OUTPATIENT
Start: 2024-02-15 | End: 2024-02-20 | Stop reason: HOSPADM

## 2024-02-15 RX ORDER — CHLORTHALIDONE 25 MG/1
25 TABLET ORAL DAILY
COMMUNITY
Start: 2024-01-31

## 2024-02-15 RX ORDER — CLOPIDOGREL BISULFATE 75 MG/1
75 TABLET ORAL ONCE
Status: COMPLETED | OUTPATIENT
Start: 2024-02-15 | End: 2024-02-15

## 2024-02-15 RX ORDER — POLYETHYLENE GLYCOL 3350 17 G/17G
17 POWDER, FOR SOLUTION ORAL DAILY PRN
Status: DISCONTINUED | OUTPATIENT
Start: 2024-02-15 | End: 2024-02-20 | Stop reason: HOSPADM

## 2024-02-15 RX ORDER — AMLODIPINE BESYLATE 5 MG/1
10 TABLET ORAL DAILY
Status: CANCELLED | OUTPATIENT
Start: 2024-02-15

## 2024-02-15 RX ORDER — ASPIRIN 81 MG/1
81 TABLET ORAL DAILY
Status: DISCONTINUED | OUTPATIENT
Start: 2024-02-16 | End: 2024-02-20 | Stop reason: HOSPADM

## 2024-02-15 RX ORDER — ATORVASTATIN CALCIUM 40 MG/1
80 TABLET, FILM COATED ORAL NIGHTLY
Status: DISCONTINUED | OUTPATIENT
Start: 2024-02-15 | End: 2024-02-20 | Stop reason: HOSPADM

## 2024-02-15 RX ORDER — CLOPIDOGREL BISULFATE 75 MG/1
75 TABLET ORAL DAILY
Status: DISCONTINUED | OUTPATIENT
Start: 2024-02-16 | End: 2024-02-20 | Stop reason: HOSPADM

## 2024-02-15 RX ORDER — AMLODIPINE BESYLATE 5 MG/1
10 TABLET ORAL
Status: COMPLETED | OUTPATIENT
Start: 2024-02-15 | End: 2024-02-15

## 2024-02-15 RX ORDER — VERAPAMIL HYDROCHLORIDE 120 MG/1
240 CAPSULE, EXTENDED RELEASE ORAL NIGHTLY
Status: DISCONTINUED | OUTPATIENT
Start: 2024-02-15 | End: 2024-02-15

## 2024-02-15 RX ORDER — ONDANSETRON 2 MG/ML
4 INJECTION INTRAMUSCULAR; INTRAVENOUS EVERY 6 HOURS PRN
Status: DISCONTINUED | OUTPATIENT
Start: 2024-02-15 | End: 2024-02-20 | Stop reason: HOSPADM

## 2024-02-15 RX ORDER — LOSARTAN POTASSIUM 50 MG/1
100 TABLET ORAL DAILY
Status: DISCONTINUED | OUTPATIENT
Start: 2024-02-15 | End: 2024-02-20 | Stop reason: HOSPADM

## 2024-02-15 RX ADMIN — POTASSIUM CHLORIDE 10 MEQ: 10 INJECTION, SOLUTION INTRAVENOUS at 04:53

## 2024-02-15 RX ADMIN — POTASSIUM CHLORIDE 10 MEQ: 10 INJECTION, SOLUTION INTRAVENOUS at 03:46

## 2024-02-15 RX ADMIN — HYDRALAZINE HYDROCHLORIDE 25 MG: 25 TABLET ORAL at 11:18

## 2024-02-15 RX ADMIN — ATORVASTATIN CALCIUM 80 MG: 40 TABLET, FILM COATED ORAL at 20:49

## 2024-02-15 RX ADMIN — AMLODIPINE BESYLATE 10 MG: 5 TABLET ORAL at 05:07

## 2024-02-15 RX ADMIN — SODIUM CHLORIDE, PRESERVATIVE FREE 10 ML: 5 INJECTION INTRAVENOUS at 11:20

## 2024-02-15 RX ADMIN — SODIUM CHLORIDE: 9 INJECTION, SOLUTION INTRAVENOUS at 05:56

## 2024-02-15 RX ADMIN — HYDRALAZINE HYDROCHLORIDE 25 MG: 25 TABLET ORAL at 20:49

## 2024-02-15 RX ADMIN — POTASSIUM CHLORIDE 40 MEQ: 1500 TABLET, EXTENDED RELEASE ORAL at 05:56

## 2024-02-15 RX ADMIN — CLOPIDOGREL BISULFATE 75 MG: 75 TABLET ORAL at 05:56

## 2024-02-15 RX ADMIN — LOSARTAN POTASSIUM 100 MG: 50 TABLET, FILM COATED ORAL at 08:46

## 2024-02-15 RX ADMIN — ACETAMINOPHEN 1000 MG: 500 TABLET ORAL at 15:21

## 2024-02-15 RX ADMIN — ASPIRIN 81 MG: 81 TABLET, CHEWABLE ORAL at 05:06

## 2024-02-15 RX ADMIN — IOPAMIDOL 100 ML: 755 INJECTION, SOLUTION INTRAVENOUS at 03:31

## 2024-02-15 RX ADMIN — SODIUM CHLORIDE, PRESERVATIVE FREE 10 ML: 5 INJECTION INTRAVENOUS at 20:49

## 2024-02-15 RX ADMIN — ONDANSETRON 4 MG: 2 INJECTION INTRAMUSCULAR; INTRAVENOUS at 03:46

## 2024-02-15 RX ADMIN — LOSARTAN POTASSIUM 50 MG: 50 TABLET, FILM COATED ORAL at 05:07

## 2024-02-15 RX ADMIN — SODIUM CHLORIDE: 9 INJECTION, SOLUTION INTRAVENOUS at 17:53

## 2024-02-15 RX ADMIN — ENOXAPARIN SODIUM 40 MG: 100 INJECTION SUBCUTANEOUS at 08:46

## 2024-02-15 ASSESSMENT — LIFESTYLE VARIABLES
HOW MANY STANDARD DRINKS CONTAINING ALCOHOL DO YOU HAVE ON A TYPICAL DAY: PATIENT DOES NOT DRINK
HOW OFTEN DO YOU HAVE A DRINK CONTAINING ALCOHOL: NEVER

## 2024-02-15 ASSESSMENT — PAIN - FUNCTIONAL ASSESSMENT
PAIN_FUNCTIONAL_ASSESSMENT: NONE - DENIES PAIN
PAIN_FUNCTIONAL_ASSESSMENT: NONE - DENIES PAIN

## 2024-02-15 NOTE — ED PROVIDER NOTES
criteria and/or time were NOT met.      HEART SCORE  Heart score not applicable: no chest pain .     Results, Consults, Medications     Consults:  IP CONSULT TO TELE-NEUROLOGY   Labs:  Recent Results (from the past 12 hour(s))   CBC with Auto Differential    Collection Time: 02/15/24  1:59 AM   Result Value Ref Range    WBC 11.6 (H) 3.6 - 11.0 K/uL    RBC 4.36 3.80 - 5.20 M/uL    Hemoglobin 13.3 11.5 - 16.0 g/dL    Hematocrit 39.1 35.0 - 47.0 %    MCV 89.7 80.0 - 99.0 FL    MCH 30.5 26.0 - 34.0 PG    MCHC 34.0 30.0 - 36.5 g/dL    RDW 12.5 11.5 - 14.5 %    Platelets 262 150 - 400 K/uL    MPV 12.1 8.9 - 12.9 FL    Nucleated RBCs 0.0 0.0  WBC    nRBC 0.00 0.00 - 0.01 K/uL    Neutrophils % 43 32 - 75 %    Lymphocytes % 44 12 - 49 %    Monocytes % 8 5 - 13 %    Eosinophils % 4 0 - 7 %    Basophils % 1 0 - 1 %    Immature Granulocytes 0 0 - 0.5 %    Neutrophils Absolute 5.0 1.8 - 8.0 K/UL    Lymphocytes Absolute 5.2 (H) 0.8 - 3.5 K/UL    Monocytes Absolute 0.9 0.0 - 1.0 K/UL    Eosinophils Absolute 0.5 (H) 0.0 - 0.4 K/UL    Basophils Absolute 0.1 0.0 - 0.1 K/UL    Absolute Immature Granulocyte 0.0 0.00 - 0.04 K/UL    Differential Type AUTOMATED     Comprehensive Metabolic Panel    Collection Time: 02/15/24  1:59 AM   Result Value Ref Range    Sodium 138 136 - 145 mmol/L    Potassium 3.1 (L) 3.5 - 5.1 mmol/L    Chloride 100 97 - 108 mmol/L    CO2 31 21 - 32 mmol/L    Anion Gap 7 5 - 15 mmol/L    Glucose 126 (H) 65 - 100 mg/dL    BUN 28 (H) 6 - 20 mg/dL    Creatinine 1.74 (H) 0.55 - 1.02 mg/dL    Bun/Cre Ratio 16 12 - 20      Est, Glom Filt Rate 32 (L) >60 ml/min/1.73m2    Calcium 9.0 8.5 - 10.1 mg/dL    Total Bilirubin 0.5 0.2 - 1.0 mg/dL    AST 39 (H) 15 - 37 U/L    ALT 27 12 - 78 U/L    Alk Phosphatase 98 45 - 117 U/L    Total Protein 7.4 6.4 - 8.2 g/dL    Albumin 3.8 3.5 - 5.0 g/dL    Globulin 3.6 2.0 - 4.0 g/dL    Albumin/Globulin Ratio 1.1 1.1 - 2.2     Protime-INR    Collection Time: 02/15/24  1:59 AM   Result

## 2024-02-15 NOTE — ED NOTES
MRI was not done because patient has a coil in her brain and MRI technician needs more details on this before they can perform the test. Daughter was contacted and will bringing info on this. TM

## 2024-02-15 NOTE — CONSULTS
[facility] NEUROLOGY CONSULTATION        Chief Complaint/Admission Diagnosis: Ischemic stroke (HCC) [I63.9]     I have been asked to see this 63 y.o. female in neurological consultation by Dr. Tse, Ambika HOLLINGSWORTH MD to render advice and opinion regarding acute left MCA infarct    ?     Impression:  62 yo man woman  with acute right MCA infarct on brain MRI. CTA head and neck with and without contrast reveals occlusion distal right M1/proximal posterior M2 division at the site of previous severe stenosis.      Recommendations:   -Bedside swallow evaluation once; if the patient fails, Make NPO.    -Every 4 hr neurochecks    -Telemetry, IV fluids    -TTE with bubble study    -Plavix 75 mg  daily, aspirin 81 mg daily  -A1C, Lipid panel,     -Atorvastatin 80 mg daily;    -Permissive hypertension with goal to treat blood pressures greater than 200/100    -PT, OT, speech consults for evaluation and management  -Counseling on lifestyle modifications such as smoking cessation, low-fat diet, and regular exercise was provided.     HPI:   History was obtained from a review of the electronic record and from the patient and family.??   62 yo woman with past medical history of brain aneurysm s/p clipping, hypertension who presented for evaluation of left sided weakness. She reports that the weakness involves the left hand but not the entire arm.  She was dropping objects.  Her daughter was concerned and recommended that she present to the ED.  Initial vitals in the ED: Blood pressure 175/71, pulse 61, respiratory rate 16, oxygen saturation 95% on room air.Laboratory evaluation reveals potassium 3.1 improved from 2.7 at presentation, BUN 28, creatinine 1.71, total CK8 70, WBC 11.6.  CT head without contrast 2/15/2024 showed area of hypoattenuation in the right posterior MCA distribution.  CTA of the head and neck also revealed distal occlusion of the right M1 proximal posterior M2 division at the site of previous severe

## 2024-02-15 NOTE — H&P
History & Physical    Primary Care Provider: Sandra Correa APRN - CARL[facility]  Source of Information: Patient/family     Chief complaint:   Chief Complaint   Patient presents with    Cerebrovascular Accident        History of Presenting Illness:   Sally Trinh is a 63 y.o. woman with a history of brain aneurysm status post clipping and hypertension who presented with several days of weakness of her left hand.  She said it came on suddenly was just involving her left hand but not her left arm.  She decided to seek medical attention because her daughter was concerned about it especially as she was dropping objects when she attempted to use her left hand.  She also reported some vague weakness of her right leg which she says has been going on for much longer.  She has intermittent headaches but not currently.  No vision changes or speech changes.  She also reported some nausea and vomiting over the last 24 hours.  No associated abdominal pain diarrhea or constipation.  No fevers or chills.  The nausea and vomiting are better and currently she says she is feeling hungry.  No chest pain shortness of breath cough or congestion  In the ED she was afebrile and hemodynamically stable although blood pressure on the high side.  Potassium was 3.1 creatinine of 1.7 glucose of 126 total CK of 872.  White count of 11.6.  Preliminary report of CTA of head and neck showed subacute to chronic right MCA infarct. Occluded posterior divisional M2 segment  of the right middle cerebral artery. Otherwise, no significant change from the  prior study.   I discussed case with ED physician and agree that patient requires admission to the hospital for further workup and management of subacute stroke and uncontrolled hypertension       Review of Systems:  Review of Systems   Constitutional:  Negative for appetite change, chills, fatigue and fever.   HENT:  Negative for sore throat and trouble swallowing.    Eyes:  Negative for

## 2024-02-15 NOTE — ED NOTES
Received report on the patient at the change of shift. Patient is A+O X 4. Patient has weakness on her left hand and is unable to hold anything. Right arm and bilateral legs with normal ROM and strength. Patient has no other neurologic deficits and passed the swallow screening. Patient is waiting for an MRI today. VSS. WCTM.

## 2024-02-16 ENCOUNTER — APPOINTMENT (OUTPATIENT)
Facility: HOSPITAL | Age: 64
DRG: 045 | End: 2024-02-16
Payer: COMMERCIAL

## 2024-02-16 LAB
ANION GAP SERPL CALC-SCNC: 5 MMOL/L (ref 5–15)
BASOPHILS # BLD: 0.1 K/UL (ref 0–0.1)
BASOPHILS NFR BLD: 1 % (ref 0–1)
BUN SERPL-MCNC: 20 MG/DL (ref 6–20)
BUN/CREAT SERPL: 15 (ref 12–20)
CA-I BLD-MCNC: 8.2 MG/DL (ref 8.5–10.1)
CHLORIDE SERPL-SCNC: 108 MMOL/L (ref 97–108)
CK SERPL-CCNC: 424 U/L (ref 26–192)
CO2 SERPL-SCNC: 28 MMOL/L (ref 21–32)
CREAT SERPL-MCNC: 1.31 MG/DL (ref 0.55–1.02)
DIFFERENTIAL METHOD BLD: ABNORMAL
ECHO BSA: 1.85 M2
EOSINOPHIL # BLD: 0.5 K/UL (ref 0–0.4)
EOSINOPHIL NFR BLD: 7 % (ref 0–7)
ERYTHROCYTE [DISTWIDTH] IN BLOOD BY AUTOMATED COUNT: 12.7 % (ref 11.5–14.5)
GLUCOSE SERPL-MCNC: 128 MG/DL (ref 65–100)
HCT VFR BLD AUTO: 36.3 % (ref 35–47)
HGB BLD-MCNC: 11.9 G/DL (ref 11.5–16)
IMM GRANULOCYTES # BLD AUTO: 0 K/UL (ref 0–0.04)
IMM GRANULOCYTES NFR BLD AUTO: 0 % (ref 0–0.5)
LYMPHOCYTES # BLD: 2.6 K/UL (ref 0.8–3.5)
LYMPHOCYTES NFR BLD: 38 % (ref 12–49)
MCH RBC QN AUTO: 30 PG (ref 26–34)
MCHC RBC AUTO-ENTMCNC: 32.8 G/DL (ref 30–36.5)
MCV RBC AUTO: 91.4 FL (ref 80–99)
MONOCYTES # BLD: 0.6 K/UL (ref 0–1)
MONOCYTES NFR BLD: 9 % (ref 5–13)
NEUTS SEG # BLD: 3.2 K/UL (ref 1.8–8)
NEUTS SEG NFR BLD: 45 % (ref 32–75)
NRBC # BLD: 0 K/UL (ref 0–0.01)
NRBC BLD-RTO: 0 PER 100 WBC
PLATELET # BLD AUTO: 221 K/UL (ref 150–400)
PMV BLD AUTO: 12.1 FL (ref 8.9–12.9)
POTASSIUM SERPL-SCNC: 3 MMOL/L (ref 3.5–5.1)
RBC # BLD AUTO: 3.97 M/UL (ref 3.8–5.2)
SODIUM SERPL-SCNC: 141 MMOL/L (ref 136–145)
WBC # BLD AUTO: 7 K/UL (ref 3.6–11)

## 2024-02-16 PROCEDURE — G0408 INPT/TELE FOLLOW UP 35: HCPCS | Performed by: PSYCHIATRY & NEUROLOGY

## 2024-02-16 PROCEDURE — 6360000002 HC RX W HCPCS: Performed by: INTERNAL MEDICINE

## 2024-02-16 PROCEDURE — 80048 BASIC METABOLIC PNL TOTAL CA: CPT

## 2024-02-16 PROCEDURE — 97165 OT EVAL LOW COMPLEX 30 MIN: CPT

## 2024-02-16 PROCEDURE — 93308 TTE F-UP OR LMTD: CPT

## 2024-02-16 PROCEDURE — 6370000000 HC RX 637 (ALT 250 FOR IP): Performed by: INTERNAL MEDICINE

## 2024-02-16 PROCEDURE — 1100000000 HC RM PRIVATE

## 2024-02-16 PROCEDURE — 97162 PT EVAL MOD COMPLEX 30 MIN: CPT

## 2024-02-16 PROCEDURE — 97530 THERAPEUTIC ACTIVITIES: CPT

## 2024-02-16 PROCEDURE — 82550 ASSAY OF CK (CPK): CPT

## 2024-02-16 PROCEDURE — 36415 COLL VENOUS BLD VENIPUNCTURE: CPT

## 2024-02-16 PROCEDURE — 92610 EVALUATE SWALLOWING FUNCTION: CPT

## 2024-02-16 PROCEDURE — 85025 COMPLETE CBC W/AUTO DIFF WBC: CPT

## 2024-02-16 PROCEDURE — 6370000000 HC RX 637 (ALT 250 FOR IP)

## 2024-02-16 PROCEDURE — 2580000003 HC RX 258: Performed by: INTERNAL MEDICINE

## 2024-02-16 RX ORDER — ACETAMINOPHEN 500 MG
1000 TABLET ORAL ONCE
Status: COMPLETED | OUTPATIENT
Start: 2024-02-16 | End: 2024-02-16

## 2024-02-16 RX ORDER — ACETAMINOPHEN 325 MG/1
650 TABLET ORAL EVERY 4 HOURS PRN
Status: DISCONTINUED | OUTPATIENT
Start: 2024-02-16 | End: 2024-02-20 | Stop reason: HOSPADM

## 2024-02-16 RX ADMIN — HYDRALAZINE HYDROCHLORIDE 25 MG: 25 TABLET ORAL at 05:39

## 2024-02-16 RX ADMIN — ACETAMINOPHEN 650 MG: 325 TABLET ORAL at 23:28

## 2024-02-16 RX ADMIN — HYDRALAZINE HYDROCHLORIDE 25 MG: 25 TABLET ORAL at 22:04

## 2024-02-16 RX ADMIN — AMLODIPINE BESYLATE 10 MG: 5 TABLET ORAL at 09:03

## 2024-02-16 RX ADMIN — CLOPIDOGREL BISULFATE 75 MG: 75 TABLET ORAL at 09:03

## 2024-02-16 RX ADMIN — HYDRALAZINE HYDROCHLORIDE 25 MG: 25 TABLET ORAL at 16:30

## 2024-02-16 RX ADMIN — ENOXAPARIN SODIUM 40 MG: 100 INJECTION SUBCUTANEOUS at 09:03

## 2024-02-16 RX ADMIN — ASPIRIN 81 MG: 81 TABLET, COATED ORAL at 09:03

## 2024-02-16 RX ADMIN — SODIUM CHLORIDE, PRESERVATIVE FREE 10 ML: 5 INJECTION INTRAVENOUS at 09:08

## 2024-02-16 RX ADMIN — LOSARTAN POTASSIUM 100 MG: 50 TABLET, FILM COATED ORAL at 09:03

## 2024-02-16 RX ADMIN — ATORVASTATIN CALCIUM 80 MG: 40 TABLET, FILM COATED ORAL at 22:04

## 2024-02-16 RX ADMIN — ACETAMINOPHEN 1000 MG: 500 TABLET ORAL at 10:28

## 2024-02-16 ASSESSMENT — PAIN SCALES - GENERAL
PAINLEVEL_OUTOF10: 3
PAINLEVEL_OUTOF10: 4
PAINLEVEL_OUTOF10: 0

## 2024-02-16 ASSESSMENT — PAIN DESCRIPTION - DESCRIPTORS
DESCRIPTORS: ACHING
DESCRIPTORS: ACHING

## 2024-02-16 ASSESSMENT — PAIN DESCRIPTION - LOCATION
LOCATION: HEAD
LOCATION: HEAD

## 2024-02-16 NOTE — CARE COORDINATION
02/16/24 1342   Service Assessment   Patient Orientation Alert and Oriented   Cognition Alert   History Provided By Patient   Primary Caregiver Self   Support Systems Children   Patient's Healthcare Decision Maker is: Legal Next of Kin   PCP Verified by CM Yes   Last Visit to PCP Within last 3 months   Prior Functional Level Independent in ADLs/IADLs   Current Functional Level Assistance with the following:;Bathing;Dressing;Toileting;Feeding;Cooking;Housework;Shopping;Mobility   Can patient return to prior living arrangement Yes   Ability to make needs known: Good   Family able to assist with home care needs: Yes   Would you like for me to discuss the discharge plan with any other family members/significant others, and if so, who? Yes   Financial Resources Medicaid   Social/Functional History   Lives With Alone   Type of Home House   Home Layout One level   Home Access Stairs to enter with rails   Entrance Stairs - Number of Steps 3     CM met with patient at bedside to perform discharge planning assessment.  Patient was independent at home at baseline.  Currently, she has no use of her left hand.  She states that therapy came in a recommended rehab.  Patient is agreeable to rehab at [facility] inpatient rehab.  CM informed patient that she will need insurance authorization.  CM asked about 2ndary choices,  CM informed her that her insurance will only pay for LTC in a SNF.  Patient does not want to go to a LTC facility if inpatient rehab is denied.  Backup choice is Home with home health.  CM sent referrals.           Advance Care Planning     General Advance Care Planning (ACP) Conversation    Date of Conversation: 2/16/2024  Conducted with: Patient with Decision Making Capacity    Healthcare Decision Maker:    Primary Decision Maker: Spohia Lang - Child - [phone]    Primary Decision Maker: Roosevelt Linn - Child - [phone]    Primary Decision Maker: Earlene Linn - Child  Click here to complete

## 2024-02-17 LAB
ANION GAP SERPL CALC-SCNC: 5 MMOL/L (ref 5–15)
BASOPHILS # BLD: 0.1 K/UL (ref 0–0.1)
BASOPHILS NFR BLD: 1 % (ref 0–1)
BUN SERPL-MCNC: 17 MG/DL (ref 6–20)
BUN/CREAT SERPL: 14 (ref 12–20)
CA-I BLD-MCNC: 9 MG/DL (ref 8.5–10.1)
CHLORIDE SERPL-SCNC: 105 MMOL/L (ref 97–108)
CO2 SERPL-SCNC: 29 MMOL/L (ref 21–32)
CREAT SERPL-MCNC: 1.2 MG/DL (ref 0.55–1.02)
DIFFERENTIAL METHOD BLD: NORMAL
EOSINOPHIL # BLD: 0.4 K/UL (ref 0–0.4)
EOSINOPHIL NFR BLD: 6 % (ref 0–7)
ERYTHROCYTE [DISTWIDTH] IN BLOOD BY AUTOMATED COUNT: 12.4 % (ref 11.5–14.5)
GLUCOSE SERPL-MCNC: 122 MG/DL (ref 65–100)
HCT VFR BLD AUTO: 39.6 % (ref 35–47)
HGB BLD-MCNC: 13.1 G/DL (ref 11.5–16)
IMM GRANULOCYTES # BLD AUTO: 0 K/UL (ref 0–0.04)
IMM GRANULOCYTES NFR BLD AUTO: 0 % (ref 0–0.5)
LYMPHOCYTES # BLD: 3 K/UL (ref 0.8–3.5)
LYMPHOCYTES NFR BLD: 41 % (ref 12–49)
MCH RBC QN AUTO: 29.8 PG (ref 26–34)
MCHC RBC AUTO-ENTMCNC: 33.1 G/DL (ref 30–36.5)
MCV RBC AUTO: 90 FL (ref 80–99)
MONOCYTES # BLD: 0.6 K/UL (ref 0–1)
MONOCYTES NFR BLD: 9 % (ref 5–13)
NEUTS SEG # BLD: 3.1 K/UL (ref 1.8–8)
NEUTS SEG NFR BLD: 43 % (ref 32–75)
NRBC # BLD: 0 K/UL (ref 0–0.01)
NRBC BLD-RTO: 0 PER 100 WBC
PLATELET # BLD AUTO: 242 K/UL (ref 150–400)
PMV BLD AUTO: 11.8 FL (ref 8.9–12.9)
POTASSIUM SERPL-SCNC: 2.9 MMOL/L (ref 3.5–5.1)
POTASSIUM SERPL-SCNC: 3.1 MMOL/L (ref 3.5–5.1)
RBC # BLD AUTO: 4.4 M/UL (ref 3.8–5.2)
SODIUM SERPL-SCNC: 139 MMOL/L (ref 136–145)
WBC # BLD AUTO: 7.2 K/UL (ref 3.6–11)

## 2024-02-17 PROCEDURE — 6370000000 HC RX 637 (ALT 250 FOR IP)

## 2024-02-17 PROCEDURE — 2580000003 HC RX 258: Performed by: INTERNAL MEDICINE

## 2024-02-17 PROCEDURE — 6370000000 HC RX 637 (ALT 250 FOR IP): Performed by: INTERNAL MEDICINE

## 2024-02-17 PROCEDURE — 36415 COLL VENOUS BLD VENIPUNCTURE: CPT

## 2024-02-17 PROCEDURE — 97530 THERAPEUTIC ACTIVITIES: CPT

## 2024-02-17 PROCEDURE — 85025 COMPLETE CBC W/AUTO DIFF WBC: CPT

## 2024-02-17 PROCEDURE — 84132 ASSAY OF SERUM POTASSIUM: CPT

## 2024-02-17 PROCEDURE — 80048 BASIC METABOLIC PNL TOTAL CA: CPT

## 2024-02-17 PROCEDURE — 6360000002 HC RX W HCPCS: Performed by: INTERNAL MEDICINE

## 2024-02-17 PROCEDURE — 6360000002 HC RX W HCPCS: Performed by: HOSPITALIST

## 2024-02-17 PROCEDURE — 6360000002 HC RX W HCPCS

## 2024-02-17 PROCEDURE — 1100000000 HC RM PRIVATE

## 2024-02-17 PROCEDURE — 2580000003 HC RX 258: Performed by: HOSPITALIST

## 2024-02-17 RX ORDER — POTASSIUM CHLORIDE 7.45 MG/ML
10 INJECTION INTRAVENOUS
Status: DISPENSED | OUTPATIENT
Start: 2024-02-17 | End: 2024-02-17

## 2024-02-17 RX ADMIN — AMLODIPINE BESYLATE 10 MG: 5 TABLET ORAL at 09:08

## 2024-02-17 RX ADMIN — SODIUM CHLORIDE, PRESERVATIVE FREE 10 ML: 5 INJECTION INTRAVENOUS at 09:15

## 2024-02-17 RX ADMIN — CLOPIDOGREL BISULFATE 75 MG: 75 TABLET ORAL at 09:08

## 2024-02-17 RX ADMIN — HYDRALAZINE HYDROCHLORIDE 25 MG: 25 TABLET ORAL at 16:11

## 2024-02-17 RX ADMIN — ENOXAPARIN SODIUM 40 MG: 100 INJECTION SUBCUTANEOUS at 09:12

## 2024-02-17 RX ADMIN — HYDRALAZINE HYDROCHLORIDE 25 MG: 25 TABLET ORAL at 20:43

## 2024-02-17 RX ADMIN — SODIUM CHLORIDE, PRESERVATIVE FREE 10 ML: 5 INJECTION INTRAVENOUS at 20:43

## 2024-02-17 RX ADMIN — OLANZAPINE 2.5 MG: 10 INJECTION, POWDER, FOR SOLUTION INTRAMUSCULAR at 01:30

## 2024-02-17 RX ADMIN — POTASSIUM BICARBONATE 20 MEQ: 782 TABLET, EFFERVESCENT ORAL at 09:08

## 2024-02-17 RX ADMIN — LOSARTAN POTASSIUM 100 MG: 50 TABLET, FILM COATED ORAL at 09:08

## 2024-02-17 RX ADMIN — ASPIRIN 81 MG: 81 TABLET, COATED ORAL at 09:08

## 2024-02-17 RX ADMIN — POTASSIUM BICARBONATE 40 MEQ: 782 TABLET, EFFERVESCENT ORAL at 20:43

## 2024-02-17 RX ADMIN — HYDRALAZINE HYDROCHLORIDE 25 MG: 25 TABLET ORAL at 06:55

## 2024-02-17 RX ADMIN — ATORVASTATIN CALCIUM 80 MG: 40 TABLET, FILM COATED ORAL at 20:44

## 2024-02-17 RX ADMIN — POTASSIUM CHLORIDE 10 MEQ: 7.46 INJECTION, SOLUTION INTRAVENOUS at 09:36

## 2024-02-17 ASSESSMENT — PAIN SCALES - GENERAL: PAINLEVEL_OUTOF10: 0

## 2024-02-18 PROBLEM — N17.9 ACUTE KIDNEY INJURY SUPERIMPOSED ON CKD (HCC): Status: ACTIVE | Noted: 2024-02-18

## 2024-02-18 PROBLEM — E78.5 HYPERLIPIDEMIA: Status: ACTIVE | Noted: 2024-02-18

## 2024-02-18 PROBLEM — N18.9 ACUTE KIDNEY INJURY SUPERIMPOSED ON CKD (HCC): Status: ACTIVE | Noted: 2024-02-18

## 2024-02-18 PROBLEM — I63.511 CEREBROVASCULAR ACCIDENT (CVA) DUE TO STENOSIS OF RIGHT MIDDLE CEREBRAL ARTERY (HCC): Status: ACTIVE | Noted: 2024-02-15

## 2024-02-18 PROBLEM — N18.9 ACUTE KIDNEY INJURY SUPERIMPOSED ON CKD (HCC): Status: RESOLVED | Noted: 2024-02-18 | Resolved: 2024-02-18

## 2024-02-18 PROBLEM — N17.9 ACUTE KIDNEY INJURY SUPERIMPOSED ON CKD (HCC): Status: RESOLVED | Noted: 2024-02-18 | Resolved: 2024-02-18

## 2024-02-18 PROBLEM — E87.6 HYPOKALEMIA: Status: ACTIVE | Noted: 2024-02-18

## 2024-02-18 PROBLEM — E87.6 HYPOKALEMIA: Status: RESOLVED | Noted: 2024-02-18 | Resolved: 2024-02-18

## 2024-02-18 PROBLEM — Z72.0 TOBACCO USE: Status: ACTIVE | Noted: 2024-02-18

## 2024-02-18 PROBLEM — N18.30 CKD (CHRONIC KIDNEY DISEASE), STAGE III (HCC): Status: ACTIVE | Noted: 2024-02-18

## 2024-02-18 PROBLEM — I10 HYPERTENSION: Status: ACTIVE | Noted: 2024-02-18

## 2024-02-18 LAB
ANION GAP SERPL CALC-SCNC: 5 MMOL/L (ref 5–15)
BASOPHILS # BLD: 0.1 K/UL (ref 0–0.1)
BASOPHILS NFR BLD: 1 % (ref 0–1)
BUN SERPL-MCNC: 24 MG/DL (ref 6–20)
BUN/CREAT SERPL: 18 (ref 12–20)
CA-I BLD-MCNC: 8.5 MG/DL (ref 8.5–10.1)
CHLORIDE SERPL-SCNC: 107 MMOL/L (ref 97–108)
CO2 SERPL-SCNC: 28 MMOL/L (ref 21–32)
CREAT SERPL-MCNC: 1.31 MG/DL (ref 0.55–1.02)
DIFFERENTIAL METHOD BLD: ABNORMAL
EOSINOPHIL # BLD: 0.5 K/UL (ref 0–0.4)
EOSINOPHIL NFR BLD: 6 % (ref 0–7)
ERYTHROCYTE [DISTWIDTH] IN BLOOD BY AUTOMATED COUNT: 12.7 % (ref 11.5–14.5)
GLUCOSE SERPL-MCNC: 124 MG/DL (ref 65–100)
HCT VFR BLD AUTO: 39.6 % (ref 35–47)
HGB BLD-MCNC: 13.1 G/DL (ref 11.5–16)
IMM GRANULOCYTES # BLD AUTO: 0 K/UL (ref 0–0.04)
IMM GRANULOCYTES NFR BLD AUTO: 0 % (ref 0–0.5)
LYMPHOCYTES # BLD: 2.9 K/UL (ref 0.8–3.5)
LYMPHOCYTES NFR BLD: 38 % (ref 12–49)
MCH RBC QN AUTO: 30.3 PG (ref 26–34)
MCHC RBC AUTO-ENTMCNC: 33.1 G/DL (ref 30–36.5)
MCV RBC AUTO: 91.5 FL (ref 80–99)
MONOCYTES # BLD: 0.6 K/UL (ref 0–1)
MONOCYTES NFR BLD: 8 % (ref 5–13)
NEUTS SEG # BLD: 3.5 K/UL (ref 1.8–8)
NEUTS SEG NFR BLD: 47 % (ref 32–75)
NRBC # BLD: 0 K/UL (ref 0–0.01)
NRBC BLD-RTO: 0 PER 100 WBC
PLATELET # BLD AUTO: 246 K/UL (ref 150–400)
PMV BLD AUTO: 11.6 FL (ref 8.9–12.9)
POTASSIUM SERPL-SCNC: 3.6 MMOL/L (ref 3.5–5.1)
RBC # BLD AUTO: 4.33 M/UL (ref 3.8–5.2)
SODIUM SERPL-SCNC: 140 MMOL/L (ref 136–145)
WBC # BLD AUTO: 7.6 K/UL (ref 3.6–11)

## 2024-02-18 PROCEDURE — 6370000000 HC RX 637 (ALT 250 FOR IP): Performed by: INTERNAL MEDICINE

## 2024-02-18 PROCEDURE — 2580000003 HC RX 258: Performed by: INTERNAL MEDICINE

## 2024-02-18 PROCEDURE — 6360000002 HC RX W HCPCS: Performed by: INTERNAL MEDICINE

## 2024-02-18 PROCEDURE — 85025 COMPLETE CBC W/AUTO DIFF WBC: CPT

## 2024-02-18 PROCEDURE — 80048 BASIC METABOLIC PNL TOTAL CA: CPT

## 2024-02-18 PROCEDURE — 6370000000 HC RX 637 (ALT 250 FOR IP)

## 2024-02-18 PROCEDURE — 36415 COLL VENOUS BLD VENIPUNCTURE: CPT

## 2024-02-18 PROCEDURE — 1100000000 HC RM PRIVATE

## 2024-02-18 RX ORDER — ASPIRIN 325 MG
325 TABLET, DELAYED RELEASE (ENTERIC COATED) ORAL DAILY
Qty: 30 TABLET | Refills: 0 | Status: SHIPPED | OUTPATIENT
Start: 2024-02-19 | End: 2024-03-20

## 2024-02-18 RX ORDER — AMLODIPINE BESYLATE 10 MG/1
10 TABLET ORAL DAILY
Qty: 30 TABLET | Refills: 0 | Status: SHIPPED | OUTPATIENT
Start: 2024-02-18

## 2024-02-18 RX ORDER — CLOPIDOGREL BISULFATE 75 MG/1
75 TABLET ORAL DAILY
Qty: 30 TABLET | Refills: 1 | Status: SHIPPED | OUTPATIENT
Start: 2024-02-19

## 2024-02-18 RX ORDER — ATORVASTATIN CALCIUM 80 MG/1
80 TABLET, FILM COATED ORAL NIGHTLY
Qty: 30 TABLET | Refills: 0 | Status: SHIPPED | OUTPATIENT
Start: 2024-02-18

## 2024-02-18 RX ADMIN — LOSARTAN POTASSIUM 100 MG: 50 TABLET, FILM COATED ORAL at 10:44

## 2024-02-18 RX ADMIN — CLOPIDOGREL BISULFATE 75 MG: 75 TABLET ORAL at 10:45

## 2024-02-18 RX ADMIN — SODIUM CHLORIDE, PRESERVATIVE FREE 10 ML: 5 INJECTION INTRAVENOUS at 20:31

## 2024-02-18 RX ADMIN — POTASSIUM BICARBONATE 40 MEQ: 782 TABLET, EFFERVESCENT ORAL at 10:44

## 2024-02-18 RX ADMIN — ENOXAPARIN SODIUM 40 MG: 100 INJECTION SUBCUTANEOUS at 10:44

## 2024-02-18 RX ADMIN — HYDRALAZINE HYDROCHLORIDE 25 MG: 25 TABLET ORAL at 06:30

## 2024-02-18 RX ADMIN — ATORVASTATIN CALCIUM 80 MG: 40 TABLET, FILM COATED ORAL at 20:30

## 2024-02-18 RX ADMIN — ASPIRIN 81 MG: 81 TABLET, COATED ORAL at 10:44

## 2024-02-18 RX ADMIN — ACETAMINOPHEN 650 MG: 325 TABLET ORAL at 21:57

## 2024-02-18 RX ADMIN — SODIUM CHLORIDE, PRESERVATIVE FREE 10 ML: 5 INJECTION INTRAVENOUS at 10:45

## 2024-02-18 RX ADMIN — HYDRALAZINE HYDROCHLORIDE 25 MG: 25 TABLET ORAL at 21:57

## 2024-02-18 RX ADMIN — AMLODIPINE BESYLATE 10 MG: 5 TABLET ORAL at 10:45

## 2024-02-18 ASSESSMENT — PAIN SCALES - GENERAL
PAINLEVEL_OUTOF10: 3
PAINLEVEL_OUTOF10: 3
PAINLEVEL_OUTOF10: 0
PAINLEVEL_OUTOF10: 0

## 2024-02-18 ASSESSMENT — PAIN DESCRIPTION - LOCATION
LOCATION: HEAD
LOCATION: HEAD

## 2024-02-18 ASSESSMENT — PAIN DESCRIPTION - DESCRIPTORS: DESCRIPTORS: ACHING

## 2024-02-18 NOTE — CARE COORDINATION
DC order noted.  Chart reviewed.  Spoke with Liaison for [facility].  Referral is received though not yet accepted or authorized by insurance,  Do not expect progress before Monday 02/18/2024.

## 2024-02-19 LAB
ANION GAP SERPL CALC-SCNC: 2 MMOL/L (ref 5–15)
BASOPHILS # BLD: 0.1 K/UL (ref 0–0.1)
BASOPHILS NFR BLD: 1 % (ref 0–1)
BUN SERPL-MCNC: 24 MG/DL (ref 6–20)
BUN/CREAT SERPL: 21 (ref 12–20)
CA-I BLD-MCNC: 8.7 MG/DL (ref 8.5–10.1)
CHLORIDE SERPL-SCNC: 110 MMOL/L (ref 97–108)
CO2 SERPL-SCNC: 28 MMOL/L (ref 21–32)
CREAT SERPL-MCNC: 1.16 MG/DL (ref 0.55–1.02)
DIFFERENTIAL METHOD BLD: ABNORMAL
EOSINOPHIL # BLD: 0.5 K/UL (ref 0–0.4)
EOSINOPHIL NFR BLD: 7 % (ref 0–7)
ERYTHROCYTE [DISTWIDTH] IN BLOOD BY AUTOMATED COUNT: 13 % (ref 11.5–14.5)
GLUCOSE SERPL-MCNC: 135 MG/DL (ref 65–100)
HCT VFR BLD AUTO: 38.4 % (ref 35–47)
HGB BLD-MCNC: 12.8 G/DL (ref 11.5–16)
IMM GRANULOCYTES # BLD AUTO: 0 K/UL (ref 0–0.04)
IMM GRANULOCYTES NFR BLD AUTO: 0 % (ref 0–0.5)
LYMPHOCYTES # BLD: 3.4 K/UL (ref 0.8–3.5)
LYMPHOCYTES NFR BLD: 43 % (ref 12–49)
MCH RBC QN AUTO: 30.4 PG (ref 26–34)
MCHC RBC AUTO-ENTMCNC: 33.3 G/DL (ref 30–36.5)
MCV RBC AUTO: 91.2 FL (ref 80–99)
MONOCYTES # BLD: 0.6 K/UL (ref 0–1)
MONOCYTES NFR BLD: 8 % (ref 5–13)
NEUTS SEG # BLD: 3.2 K/UL (ref 1.8–8)
NEUTS SEG NFR BLD: 41 % (ref 32–75)
NRBC # BLD: 0 K/UL (ref 0–0.01)
NRBC BLD-RTO: 0 PER 100 WBC
PLATELET # BLD AUTO: 235 K/UL (ref 150–400)
PMV BLD AUTO: 12.1 FL (ref 8.9–12.9)
POTASSIUM SERPL-SCNC: 3.3 MMOL/L (ref 3.5–5.1)
RBC # BLD AUTO: 4.21 M/UL (ref 3.8–5.2)
SODIUM SERPL-SCNC: 140 MMOL/L (ref 136–145)
WBC # BLD AUTO: 7.8 K/UL (ref 3.6–11)

## 2024-02-19 PROCEDURE — 6370000000 HC RX 637 (ALT 250 FOR IP)

## 2024-02-19 PROCEDURE — 6370000000 HC RX 637 (ALT 250 FOR IP): Performed by: INTERNAL MEDICINE

## 2024-02-19 PROCEDURE — 97530 THERAPEUTIC ACTIVITIES: CPT

## 2024-02-19 PROCEDURE — 6360000002 HC RX W HCPCS: Performed by: INTERNAL MEDICINE

## 2024-02-19 PROCEDURE — 2580000003 HC RX 258: Performed by: INTERNAL MEDICINE

## 2024-02-19 PROCEDURE — 36415 COLL VENOUS BLD VENIPUNCTURE: CPT

## 2024-02-19 PROCEDURE — 80048 BASIC METABOLIC PNL TOTAL CA: CPT

## 2024-02-19 PROCEDURE — 85025 COMPLETE CBC W/AUTO DIFF WBC: CPT

## 2024-02-19 PROCEDURE — 1100000000 HC RM PRIVATE

## 2024-02-19 PROCEDURE — 6370000000 HC RX 637 (ALT 250 FOR IP): Performed by: PHYSICIAN ASSISTANT

## 2024-02-19 RX ADMIN — SODIUM CHLORIDE, PRESERVATIVE FREE 10 ML: 5 INJECTION INTRAVENOUS at 09:04

## 2024-02-19 RX ADMIN — HYDRALAZINE HYDROCHLORIDE 25 MG: 25 TABLET ORAL at 14:08

## 2024-02-19 RX ADMIN — POTASSIUM BICARBONATE 40 MEQ: 782 TABLET, EFFERVESCENT ORAL at 09:01

## 2024-02-19 RX ADMIN — HYDRALAZINE HYDROCHLORIDE 25 MG: 25 TABLET ORAL at 20:31

## 2024-02-19 RX ADMIN — ASPIRIN 81 MG: 81 TABLET, COATED ORAL at 09:04

## 2024-02-19 RX ADMIN — ACETAMINOPHEN 650 MG: 325 TABLET ORAL at 18:20

## 2024-02-19 RX ADMIN — ENOXAPARIN SODIUM 40 MG: 100 INJECTION SUBCUTANEOUS at 09:04

## 2024-02-19 RX ADMIN — ATORVASTATIN CALCIUM 80 MG: 40 TABLET, FILM COATED ORAL at 20:31

## 2024-02-19 RX ADMIN — SODIUM CHLORIDE, PRESERVATIVE FREE 10 ML: 5 INJECTION INTRAVENOUS at 20:35

## 2024-02-19 RX ADMIN — HYDRALAZINE HYDROCHLORIDE 25 MG: 25 TABLET ORAL at 05:58

## 2024-02-19 RX ADMIN — LOSARTAN POTASSIUM 100 MG: 50 TABLET, FILM COATED ORAL at 09:04

## 2024-02-19 RX ADMIN — ACETAMINOPHEN 650 MG: 325 TABLET ORAL at 14:11

## 2024-02-19 RX ADMIN — AMLODIPINE BESYLATE 10 MG: 5 TABLET ORAL at 09:04

## 2024-02-19 RX ADMIN — CLOPIDOGREL BISULFATE 75 MG: 75 TABLET ORAL at 09:04

## 2024-02-19 ASSESSMENT — PAIN SCALES - GENERAL
PAINLEVEL_OUTOF10: 0
PAINLEVEL_OUTOF10: 0
PAINLEVEL_OUTOF10: 4
PAINLEVEL_OUTOF10: 0
PAINLEVEL_OUTOF10: 3

## 2024-02-19 ASSESSMENT — PAIN DESCRIPTION - LOCATION
LOCATION: HEAD
LOCATION: HEAD

## 2024-02-19 ASSESSMENT — PAIN DESCRIPTION - DESCRIPTORS
DESCRIPTORS: ACHING
DESCRIPTORS: ACHING

## 2024-02-19 NOTE — CARE COORDINATION
CM has reviewed pt chart    DCP: [facility] pending acceptance and auth approval    0828: CM will need updated PT/OT notes for auth to be started. CM sent message to [facility] requesting update on acceptance status. Awaiting reply.     1200: [employer] has started auth, ref #[id number]    1512: CM observed updated OT note in pt chart.

## 2024-02-20 VITALS
DIASTOLIC BLOOD PRESSURE: 69 MMHG | OXYGEN SATURATION: 100 % | HEIGHT: 67 IN | TEMPERATURE: 98.2 F | SYSTOLIC BLOOD PRESSURE: 134 MMHG | WEIGHT: 160 LBS | BODY MASS INDEX: 25.11 KG/M2 | HEART RATE: 78 BPM | RESPIRATION RATE: 18 BRPM

## 2024-02-20 LAB
ANION GAP SERPL CALC-SCNC: 3 MMOL/L (ref 5–15)
BASOPHILS # BLD: 0.1 K/UL (ref 0–0.1)
BASOPHILS NFR BLD: 1 % (ref 0–1)
BUN SERPL-MCNC: 21 MG/DL (ref 6–20)
BUN/CREAT SERPL: 18 (ref 12–20)
CA-I BLD-MCNC: 8.7 MG/DL (ref 8.5–10.1)
CHLORIDE SERPL-SCNC: 107 MMOL/L (ref 97–108)
CO2 SERPL-SCNC: 29 MMOL/L (ref 21–32)
CREAT SERPL-MCNC: 1.17 MG/DL (ref 0.55–1.02)
DIFFERENTIAL METHOD BLD: ABNORMAL
EOSINOPHIL # BLD: 0.5 K/UL (ref 0–0.4)
EOSINOPHIL NFR BLD: 6 % (ref 0–7)
ERYTHROCYTE [DISTWIDTH] IN BLOOD BY AUTOMATED COUNT: 12.8 % (ref 11.5–14.5)
GLUCOSE SERPL-MCNC: 150 MG/DL (ref 65–100)
HCT VFR BLD AUTO: 39.2 % (ref 35–47)
HGB BLD-MCNC: 13.2 G/DL (ref 11.5–16)
IMM GRANULOCYTES # BLD AUTO: 0 K/UL (ref 0–0.04)
IMM GRANULOCYTES NFR BLD AUTO: 0 % (ref 0–0.5)
LYMPHOCYTES # BLD: 3.1 K/UL (ref 0.8–3.5)
LYMPHOCYTES NFR BLD: 39 % (ref 12–49)
MCH RBC QN AUTO: 30.6 PG (ref 26–34)
MCHC RBC AUTO-ENTMCNC: 33.7 G/DL (ref 30–36.5)
MCV RBC AUTO: 91 FL (ref 80–99)
MONOCYTES # BLD: 0.6 K/UL (ref 0–1)
MONOCYTES NFR BLD: 7 % (ref 5–13)
NEUTS SEG # BLD: 3.7 K/UL (ref 1.8–8)
NEUTS SEG NFR BLD: 47 % (ref 32–75)
NRBC # BLD: 0 K/UL (ref 0–0.01)
NRBC BLD-RTO: 0 PER 100 WBC
PLATELET # BLD AUTO: 259 K/UL (ref 150–400)
PMV BLD AUTO: 12.1 FL (ref 8.9–12.9)
POTASSIUM SERPL-SCNC: 3.6 MMOL/L (ref 3.5–5.1)
RBC # BLD AUTO: 4.31 M/UL (ref 3.8–5.2)
SODIUM SERPL-SCNC: 139 MMOL/L (ref 136–145)
WBC # BLD AUTO: 7.8 K/UL (ref 3.6–11)

## 2024-02-20 PROCEDURE — 6370000000 HC RX 637 (ALT 250 FOR IP): Performed by: PHYSICIAN ASSISTANT

## 2024-02-20 PROCEDURE — 2580000003 HC RX 258: Performed by: INTERNAL MEDICINE

## 2024-02-20 PROCEDURE — 36415 COLL VENOUS BLD VENIPUNCTURE: CPT

## 2024-02-20 PROCEDURE — 6370000000 HC RX 637 (ALT 250 FOR IP): Performed by: INTERNAL MEDICINE

## 2024-02-20 PROCEDURE — 6370000000 HC RX 637 (ALT 250 FOR IP)

## 2024-02-20 PROCEDURE — 80048 BASIC METABOLIC PNL TOTAL CA: CPT

## 2024-02-20 PROCEDURE — 85025 COMPLETE CBC W/AUTO DIFF WBC: CPT

## 2024-02-20 PROCEDURE — 6360000002 HC RX W HCPCS: Performed by: INTERNAL MEDICINE

## 2024-02-20 RX ORDER — CHLORTHALIDONE 25 MG/1
25 TABLET ORAL DAILY
Status: DISCONTINUED | OUTPATIENT
Start: 2024-02-20 | End: 2024-02-20 | Stop reason: HOSPADM

## 2024-02-20 RX ADMIN — ENOXAPARIN SODIUM 40 MG: 100 INJECTION SUBCUTANEOUS at 09:00

## 2024-02-20 RX ADMIN — ONDANSETRON 4 MG: 2 INJECTION INTRAMUSCULAR; INTRAVENOUS at 03:01

## 2024-02-20 RX ADMIN — LOSARTAN POTASSIUM 100 MG: 50 TABLET, FILM COATED ORAL at 09:01

## 2024-02-20 RX ADMIN — SODIUM CHLORIDE, PRESERVATIVE FREE 10 ML: 5 INJECTION INTRAVENOUS at 09:01

## 2024-02-20 RX ADMIN — CLOPIDOGREL BISULFATE 75 MG: 75 TABLET ORAL at 09:01

## 2024-02-20 RX ADMIN — HYDRALAZINE HYDROCHLORIDE 25 MG: 25 TABLET ORAL at 13:29

## 2024-02-20 RX ADMIN — AMLODIPINE BESYLATE 10 MG: 5 TABLET ORAL at 09:01

## 2024-02-20 RX ADMIN — ASPIRIN 81 MG: 81 TABLET, COATED ORAL at 09:00

## 2024-02-20 RX ADMIN — CHLORTHALIDONE 25 MG: 25 TABLET ORAL at 09:01

## 2024-02-20 RX ADMIN — HYDRALAZINE HYDROCHLORIDE 25 MG: 25 TABLET ORAL at 05:40

## 2024-02-20 RX ADMIN — ACETAMINOPHEN 650 MG: 325 TABLET ORAL at 13:29

## 2024-02-20 ASSESSMENT — PAIN DESCRIPTION - LOCATION: LOCATION: HEAD

## 2024-02-20 ASSESSMENT — PAIN SCALES - GENERAL: PAINLEVEL_OUTOF10: 3

## 2024-02-20 ASSESSMENT — PAIN DESCRIPTION - DESCRIPTORS: DESCRIPTORS: ACHING

## 2024-02-20 NOTE — CARE COORDINATION
Transition of Care Plan:    RUR: 10%  Prior Level of Functioning: Home self care  Disposition: IRF at [facility] can accept at 1500  If SNF or IPR: Date FOC offered: 2/16  Date FOC received: 2/16  Accepting facility: [facility]  Date authorization started with reference number: 2/19 ref [id number]  Date authorization received and expires: 2/20  Follow up appointments: unit secretary to setup as needed  DME needed: none  Transportation at discharge: daughter  IM/IMM Medicare/ letter given: n/a  Is patient a  and connected with VA? no  If yes, was  transfer form completed and VA notified? N/a  Caregiver Contact: daughter Sophia Lang  Discharge Caregiver contacted prior to discharge? yes  Care Conference needed? no  Barriers to discharge: none    Primary nurse can call report to [facility] at [phone], pt can be accepted at 1500. No room assignment at this time.

## 2024-02-20 NOTE — DISCHARGE SUMMARY
Discharge Summary    Name: Sally Trinh  [id number]  YOB: 1960 (Age: 63 y.o.)   Date of Admission: 2/15/2024  Date of Discharge: 2/18/2024  Attending Physician: Lambert Becerril MD    Discharge Diagnosis:   Principal Problem:    Cerebrovascular accident (CVA) due to stenosis of right middle cerebral artery (HCC)  Active Problems:    Hypertension    Hyperlipidemia    Tobacco use    CKD (chronic kidney disease), stage III (HCC)  Resolved Problems:    Hypokalemia    Acute kidney injury superimposed on CKD (HCC)  Additional small acute infarct in the right tricia.    Consultations:  IP CONSULT TO TELE-NEUROLOGY      Brief Admission History/Reason for Admission Per Ambika Tse MD:   CVA    Brief Hospital Course by Main Problems:   Sally Trinh is a 63-year-old female with hypertension and history of brain aneurysm status post clipping admitted 2/15/24 with several days of weakness in her left hand.  CTA of head and neck shows subacute to chronic right MCA infarct.  Occluded posterior divisional M2 segment of the right middle cerebral artery. MRI shows large acute right MCA territory infarct and additional small acute infarct in the right tricia.  Mild chronic microvascular ischemic disease.  Small chronic infarcts in the left basal ganglia and left tricia.. Tele-neuro consulted.  Echo shows normal left ventricular systolic function with EF 40 to 45%.  Normal wall motion, abnormal diastolic function.  Patient hypokalemic, supplementation provided and potassium is 3.6 today.  PT/OT consulted and recommending IRF.  Case management consulted and working on [employer] approval.    Discharge Exam:  Patient seen and examined by me on discharge day.  Patient resting comfortably in no acute distress.  She has no current complaints at this time.  She denies any chest pain, shortness of breath, abdominal pain, headache, lightheadedness or dizziness.  He 
                                       Discharge Summary    Name: Sally Trinh  [id number]  YOB: 1960 (Age: 63 y.o.)   Date of Admission: 2/15/2024  Date of Discharge: 2/19/2024  Attending Physician: Shoaib Lopez MD    Discharge Diagnosis:   Principal Problem:    Cerebrovascular accident (CVA) due to stenosis of right middle cerebral artery (HCC)  Active Problems:    Hypertension    Hyperlipidemia    Tobacco use    CKD (chronic kidney disease), stage III (HCC)  Resolved Problems:    Hypokalemia    Acute kidney injury superimposed on CKD (HCC)  Additional small acute infarct in the right tricia.    Consultations:  IP CONSULT TO TELE-NEUROLOGY      Brief Admission History/Reason for Admission Per Ambika Tse MD:   CVA    Brief Hospital Course by Main Problems:   Sally Trinh is a 63-year-old female with hypertension and history of brain aneurysm status post clipping admitted 2/15/24 with several days of weakness in her left hand.  CTA of head and neck shows subacute to chronic right MCA infarct.  Occluded posterior divisional M2 segment of the right middle cerebral artery. MRI shows large acute right MCA territory infarct and additional small acute infarct in the right tricia.  Mild chronic microvascular ischemic disease.  Small chronic infarcts in the left basal ganglia and left tricia. Tele-neuro consulted, recommended DAPT for 30 days then switch to Plavix 75 mg daily, high-dose statin, outpatient neurology follow-up.  Echo shows normal left ventricular systolic function with EF 40 to 45%, normal wall motion, abnormal diastolic function.  Patient hypokalemic, supplementation provided.  She will need her potassium level rechecked with her PCP within 1 week.  A1c 6.8%, patient should discuss with PCP regarding improved blood sugar control outpatient, as well as to discuss elevated lipid panel, should be rechecked in 3 months for improvement while on statin. Patient 
tablet by mouth daily     atorvastatin 80 MG tablet  Commonly known as: LIPITOR  Take 1 tablet by mouth nightly     clopidogrel 75 MG tablet  Commonly known as: PLAVIX  Take 1 tablet by mouth daily            CONTINUE taking these medications      amLODIPine 10 MG tablet  Commonly known as: NORVASC  Take 1 tablet by mouth daily     chlorthalidone 25 MG tablet  Commonly known as: HYGROTON     losartan 100 MG tablet  Commonly known as: COZAAR     nicotine 21 MG/24HR  Commonly known as: NICODERM CQ  Place 1 patch onto the skin daily            STOP taking these medications      verapamil 180 MG extended release tablet  Commonly known as: CALAN SR               Where to Get Your Medications        These medications were sent to [facility] [address] - P [phone] - F [phone]  [address] [facility] [facility] [address]      Hours: 24-hours Phone: [phone]   amLODIPine 10 MG tablet  aspirin 325 MG EC tablet  atorvastatin 80 MG tablet  clopidogrel 75 MG tablet             DISPOSITION:    Home with Family:       Home with HH/PT/OT/RN:    SNF/LTC:    KEMAR:    OTHER: IRF           Code status: Full  Recommended diet: regular diet and low-sodium  Recommended activity: activity as tolerated  Wound care: None      Follow up with:   PCP : Sandra Correa APRN - NP Bagby, Erica L, APRN - NP  [address] [facility] [address] [phone]    Follow up in 1 week(s)  Follow-up for recent hospital admission.  Patient will need recheck of CBC, BMP, specifically looking at potassium as she has been borderline hypokalemic during admission.    Dg Guardado MD  [address]  [address]  [facility] [address] [phone]    Follow up in 2 week(s)  Follow-up after hospitalization for CVA.    Mark Sosa MD  [address] [facility] [address] [phone]    Follow up in 2 week(s)  Continued outpatient cardiac care for [facility] EMERGENCY DEP  [address] [facility]

## 2024-02-20 NOTE — CARE COORDINATION
CM has reviewed pt chart    DCP: [facility] pending insurance auth initiated 2/19    0817: CM sent message to [facility] requesting update on auth status, awaiting reply    0824: CM printed completed [employer] to provide to pt and dtr    1033: CM received message from [facility] auth has been approved    1037: CM called and spoke with pt daughter and informed auth has been approved. [facility] able to take at 1500. Daughter to transport at [place].

## 2024-02-20 NOTE — PLAN OF CARE
Speech LAnguage Pathology Dysphagia EVALUATION    Patient: Sally Trinh (63 y.o. female)  Date: 2/16/2024  Primary Diagnosis: Hypokalemia [E87.6]  Stroke-like symptoms [R29.90]  Ischemic stroke (HCC) [I63.9]       Precautions:  Fall Risk, General Precautions                DIET RECOMMENDATIONS: Regular and thin liquids, meds as tolerated,    SWALLOW SAFETY PRECAUTIONS: Rec slow rate of intake, small bites/sips, effortful swallow, and remain upright 30 minutes after PO intake.       ASSESSMENT :  Based on the objective data described below, the patient presents with largely wfl oropharyngeal swallow fxn.  Slight flattening L nasolabial fold no evidence of dysarthria. Informally, cognitive deficits present w/ attention, memory, and processing. Reduced awareness of cognitive deficits.  Oral phase c/b adequate mastication however there is oral residue L buccal pocket s/t decreased sensation patient able to clear w/ liquid was and lingual sweep. Pharyngeal phase c/b timely swallow and HLE is wfl upon palpation.   No overt s/s of pen/asp observed.   Patient will benefit from skilled intervention to address the above impairments.    GOALS:    Problem: SLP Adult - Impaired Swallowing  Goal: By Discharge: Advance to least restrictive diet without signs or symptoms of aspiration for planned discharge setting.  See evaluation for individualized goals.  Description: Speech Therapy Swallow Goals  Initiated 2/16/2024  -Patient will tolerate regular diet with thin liquids without clinical indicators of aspiration given no cues within 7 day(s).        -Patient will tolerate PO trials without clinical indicators of aspiration given no cues within 7 day(s).      -Patient will participate in modified barium swallow study within 7 day(s).      -Patient will demonstrate understanding of swallow safety precautions and aspiration precautions, diet recs with no cues within 7day(s).    -Patient/caregiver goal: swallow 
  Problem: Discharge Planning  Goal: Discharge to home or other facility with appropriate resources  2/18/2024 2344 by Jennifer Renteria RN  Outcome: Progressing  Flowsheets (Taken 2/18/2024 2026)  Discharge to home or other facility with appropriate resources:   Identify barriers to discharge with patient and caregiver   Arrange for needed discharge resources and transportation as appropriate   Identify discharge learning needs (meds, wound care, etc)  2/18/2024 2007 by Linda Pulliam RN  Outcome: Progressing     Problem: Skin/Tissue Integrity  Goal: Absence of new skin breakdown  Description: 1.  Monitor for areas of redness and/or skin breakdown  2.  Assess vascular access sites hourly  3.  Every 4-6 hours minimum:  Change oxygen saturation probe site  4.  Every 4-6 hours:  If on nasal continuous positive airway pressure, respiratory therapy assess nares and determine need for appliance change or resting period.  2/18/2024 2344 by Jennifer Renteria RN  Outcome: Progressing  2/18/2024 2007 by Linda Pulliam RN  Outcome: Progressing     Problem: Safety - Adult  Goal: Free from fall injury  2/18/2024 2344 by Jennifer Renteria RN  Outcome: Progressing  Flowsheets (Taken 2/18/2024 2342)  Free From Fall Injury:   Instruct family/caregiver on patient safety   Based on caregiver fall risk screen, instruct family/caregiver to ask for assistance with transferring infant if caregiver noted to have fall risk factors  2/18/2024 2007 by Linda Pulliam RN  Outcome: Progressing     Problem: Chronic Conditions and Co-morbidities  Goal: Patient's chronic conditions and co-morbidity symptoms are monitored and maintained or improved  2/18/2024 2344 by Jennifer Renteria RN  Outcome: Progressing  Flowsheets (Taken 2/18/2024 2026)  Care Plan - Patient's Chronic Conditions and Co-Morbidity Symptoms are Monitored and Maintained or Improved:   Monitor and assess patient's chronic conditions and comorbid symptoms for stability, 
  Problem: Discharge Planning  Goal: Discharge to home or other facility with appropriate resources  2/19/2024 1945 by Ana Luisa Sands RN  Outcome: Progressing  Flowsheets (Taken 2/19/2024 1945)  Discharge to home or other facility with appropriate resources:   Identify barriers to discharge with patient and caregiver   Arrange for needed discharge resources and transportation as appropriate  2/19/2024 1225 by Mirtha Garcia RN  Outcome: Progressing  Flowsheets (Taken 2/19/2024 0800)  Discharge to home or other facility with appropriate resources: Identify barriers to discharge with patient and caregiver     Problem: Skin/Tissue Integrity  Goal: Absence of new skin breakdown  Description: 1.  Monitor for areas of redness and/or skin breakdown  2.  Assess vascular access sites hourly  3.  Every 4-6 hours minimum:  Change oxygen saturation probe site  4.  Every 4-6 hours:  If on nasal continuous positive airway pressure, respiratory therapy assess nares and determine need for appliance change or resting period.  2/19/2024 1945 by Ana Luisa Sands RN  Outcome: Progressing  2/19/2024 1225 by Mirtha Garcia RN  Outcome: Progressing     Problem: Safety - Adult  Goal: Free from fall injury  2/19/2024 1945 by Ana Luisa Snads RN  Outcome: Progressing  Flowsheets (Taken 2/19/2024 1945)  Free From Fall Injury:   Instruct family/caregiver on patient safety   Based on caregiver fall risk screen, instruct family/caregiver to ask for assistance with transferring infant if caregiver noted to have fall risk factors  2/19/2024 1225 by Mirtha Garcia RN  Outcome: Progressing     Problem: Chronic Conditions and Co-morbidities  Goal: Patient's chronic conditions and co-morbidity symptoms are monitored and maintained or improved  2/19/2024 1945 by Ana Luisa Sands RN  Outcome: Progressing  Flowsheets (Taken 2/19/2024 1945)  Care Plan - Patient's Chronic Conditions and Co-Morbidity Symptoms are Monitored and Maintained or 
PHYSICAL THERAPY EVALUATION  Patient: Sally Trinh (63 y.o. female)  Date: 2/16/2024  Primary Diagnosis: Hypokalemia [E87.6]  Stroke-like symptoms [R29.90]  Ischemic stroke (HCC) [I63.9]       Precautions: Fall Risk, General Precautions                      Recommendations for nursing mobility: Amb to bathroom with AD and gait belt    In place during session: Peripheral IV and External Catheter    ASSESSMENT  Pt is a 63 y.o. female admitted on 2/15/2024 for stroke like symp,left hand,; pt currently being treated for  CVA. MRI shows large acute R MCA territory infarct in addition to small acute infarct in the R tricia.   . Pt semi supine  upon PT arrival, agreeable to evaluation. Pt A&O x 4.      Based on the objective data described below, the patient currently presents with impaired ability to perform high-level IADLs, impaired strength, decreased activity tolerance, poor safety awareness, and poor attention/concentration. (See below for objective details and assist levels).     Overall pt tolerated session fair to good today with PT. Pt required sba  for bed mobility and transfers. Pt amb 25  feet with sba,initially started using RW but than left the RW outside the bathroom and went to toilet(Patient very impulsive), gt belt and cg to sba; demonstrates difficulty using the left hand and arm.sometimes was able to raise it but other time unable to.left hand more weak.Please refer to OT eval.. Pt will benefit from continued skilled PT to address above deficits and return to PLOF. Potential barriers for safe discharge: . Current PT DC recommendation Inpatient Rehabilitation Facility  once medically appropriate.      GOALS:    Problem: Physical Therapy - Adult  Goal: By Discharge: Performs mobility at highest level of function for planned discharge setting.  See evaluation for individualized goals.  Description: FUNCTIONAL STATUS PRIOR TO ADMISSION: Patient was modified independent using a rolling walker 
concern for pt safely navigating or managing the home environment. Current PT DC recommendation Inpatient Rehabilitation Facility  once medically appropriate.      GOALS:    Problem: Physical Therapy - Adult  Goal: By Discharge: Performs mobility at highest level of function for planned discharge setting.  See evaluation for individualized goals.  Description: FUNCTIONAL STATUS PRIOR TO ADMISSION: Patient was modified independent using a rolling walker for functional mobility.    HOME SUPPORT PRIOR TO ADMISSION: The patient lived alone with family to provide assistance.    Physical Therapy Goals  Initiated 2/16/2024  Pt stated goal: Get better  Pt will be I with LE HEP in 7 days.  Pt will perform bed mobility with Modified [place] in 7 days.  Pt will perform transfers with Modified [place] in 7 days.   Pt will amb 100 feet with LRAD safely with Modified [place] in 7 days.  Pt will verbalize and demonstrate compliance with fall precautions  in 7 days.   Pt will demonstrate improvement in standing balance from fair to good in 7 days.    Outcome: Progressing          PLAN :  Patient continues to benefit from skilled intervention to address functional impairments. Continue treatment per established plan of care to address goals.    Recommendation for discharge: (in order for the patient to meet his/her long term goals)  Inpatient Rehabilitation Facility     IF patient discharges home will need the following DME:continuing to assess with progress     SUBJECTIVE:   Patient stated “I want him to disconnect  my IV because the cords keep getting in the way when I'm moving.”    OBJECTIVE DATA SUMMARY:   Critical Behavior:  Orientation  Orientation Level: Oriented X4  Cognition  Overall Cognitive Status: Exceptions  Arousal/Alertness: Inconsistent responses to stimuli  Following Commands: Inconsistently follows commands  Attention Span: Difficulty attending to directions  Safety Judgement: Decreased awareness of 
Training:  Gait Training: Yes  Gait  Gait Training: Yes  Overall Level of Assistance: Contact-guard assistance;Assist X2  Distance (ft): 25 Feet  Assistive Device: Gait belt;Walker, rolling    Therapeutic Exercises:     EXERCISE   Sets   Reps   Active Active Assist   Passive Self ROM   Comments   Ankle Pumps  12 [x] [] [] []    Quad Sets/Glut Sets   [] [] [] []    Hamstring Sets   [] [] [] []    Short Arc Quads   [] [] [] []    Heel Slides   [] [] [] []    Straight Leg Raises   [] [] [] []    Hip abd/add   [] [] [] []    Long Arc Quads  12 [x] [] [] []    Marching 2 12 [x] [] [] []       [] [] [] []       Pain Ratin/10 reported    Activity Tolerance:   Good    After treatment patient left in no apparent distress:   Bed locked and returned to lowest position, Patient left in no apparent distress sitting up in chair and Call bell within reach, and nsg updated     COMMUNICATION/COLLABORATION:   The patient’s plan of care was discussed with: Occupational therapy assistant and Registered nurse    Patient Education  Education Given To: Patient  Education Provided: Role of Therapy;Plan of Care;Home Exercise Program;Precautions;Transfer Training;Energy Conservation;Equipment  Education Method: Demonstration;Verbal  Education Outcome: Verbalized understanding;Continued education needed;Demonstrated understanding    PT/OT sessions occurred together for increased safety of pt and clinician due to impulsivity.     Elda Yoon PTA  Minutes: 27

## 2024-02-20 NOTE — PROGRESS NOTES
Hospitalist Progress Note    NAME:   Sally Trinh   : 1960   MRN: [id number]     Date/Time: 2/15/2024 3:06 PM  Patient PCP: Sandra Correa APRN - NP    Estimated discharge date: 24 to 48 hours  Barriers: PT/OT evaluation, neuro clearance    Hospital Course:  Sally Trinh is a 63-year-old female with hypertension and history of brain aneurysm status post clipping admitted  with several days of weakness in her left hand.  CTA of head and neck shows subacute to chronic right MCA infarct.  Occluded posterior divisional M2 segment of the right middle cerebral artery. MRI shows large acute right MCA territory infarct and additional small acute infarct in the right tricia.  Mild chronic microvascular ischemic disease.  Small chronic infarcts in the left basal ganglia and left tricia.. Tele-neuro consulted.  Echo pending.  PT/OT consulted.    Assessment / Plan:  CVA-large acute right MCA territory infarct.  Additional small acute infarct in the right tricia.  - CTA shows occlusion distal right M1/proximal posterior M2 division at site of previous severe stenosis.  Moderate to large right posterior MCA territory infarction.  May be subacute/acute.  Extensive atherosclerotic vascular disease, probable occluded cervical left vertebral artery.  Stenosis involving the right V4 vertebral artery and basilar artery as well as left M2 middle cerebral artery branches.  Previous coil embolization of anterior communicating artery.  - MRI positive for large acute right MCA territory infarct and additional small acute infarct in the right tricia.  Mild chronic microvascular ischemic disease.  Small chronic infarcts in the left basal ganglia and left tricia.  -Echo pending  - Aspirin Plavix  - Atorvastatin  - PT/OT consultation  -Permissive hypertension allowed for first 24 hours, keep SBP less than 200  -tele- neuro consulted    Hypertension  - Will allow permissive hypertension for 24 hours, she 
Attempted to complete speech/language evaluation. Patient sleeping soundly, opens eyes briefly to verbal cues and sternal rub but unable to maintain alertness. Per RN, patient continues to exhibit confusion, tolerating diet without difficulty. Will attempt eval on a later date.   
Neuro checks not done at 15:00pm because patient is off unit  
OCCUPATIONAL THERAPY EVALUATION  Patient: Sally Trinh (63 y.o. female)  Date: 2/16/2024  Primary Diagnosis: Hypokalemia [E87.6]  Stroke-like symptoms [R29.90]  Ischemic stroke (HCC) [I63.9]       Precautions: Fall Risk, Bed Alarm                Recommendations for nursing mobility: Use of bed/chair alarm for safety, AD and gt belt for bed to chair , Amb to bathroom with AD and gait belt, and Assist x1    In place during session:External Catheter and EKG/telemetry   ASSESSMENT  Pt is a 63 y.o. female presenting to [facility] with c/o weakness in L hand, admitted 2/15 and currently being treated for CVA. MRI shows large acute R MCA territory infarct in addition to small acute infarct in the R tricia. Pt received semi-supine in bed upon arrival, AXO x4, and agreeable to OT evaluation. H/o CVA w/ deficits in speech; reports having trouble w/ articulating words.    Based on current observations, pt presents with decreased  functional mobility, independence in ADLs, ROM, strength, activity tolerance, endurance, balance (see below for objective details and assist levels).     Overall, pt tolerates session fair w/ c/o pain, dizziness, or SOB. Pt req'd CGA for all functional mobility including OOB mobility using gait belt and HHA; no LOB or unsteadiness. However, increased risk for falls d/t decreased active ROM in L wrist/hand and limited coordination in LUE. Pt able to complete active L active shoulder flexion to ~100 degrees but unable to control forearm/hand resulting in pt hitting self in face. OT educated pt on completing PROM of L shoulder, wrist, and fingers to help improve ROM; pt demo'd good understanding. OT educated pt on putting LUE in safe position across body to reduce risk of injury d/t decreased coordination, no AROM in wrist/hand, and decreased sensation from elbow to fingertips; pt verbalized understanding. She req'd min vc's to maintain safe positioning of LUE during bed mobility; pt tended to 
OCCUPATIONAL THERAPY TREATMENT  Patient: Sally Trinh (63 y.o. female)  Date: 2/19/2024  Primary Diagnosis: Hypokalemia [E87.6]  Stroke-like symptoms [R29.90]  Ischemic stroke (HCC) [I63.9]       Precautions: Fall Risk, General Precautions                Recommendations for nursing mobility: Out of bed to chair for meals, Encourage HEP in prep for ADLs/mobility; see handout for details, AD and gt belt for bed to chair , Amb to bathroom with AD and gait belt, and Assist x1    In place during session: External Catheter and EKG/telemetry   Chart, occupational therapy assessment, plan of care, and goals were reviewed.  ASSESSMENT  Patient continues with skilled OT services and is progressing towards goals. Pt semi supine in bed upon CORTEZ arrival, agreeable to session. Pt A&O x 4. Pt completed bed mobility w/ overall Mod I w/ use of side rail.  Pt completed light grooming and UE therex sitting eob.  See grid below for UE therex, completed to maintain/ increase strength and endurance to aid in adl performance. Education provided on self AAROM of L UE.  Movement noted in all muscle groups, improved from previous session. Pt given yellow foam block for gross grasp exercises for L hand.  PTA joined session for partial co-tx d/t level of assistance needed for oob activities.  Also pt demonstrating impulsive behavior so partial co-tx for the safety of pt and staff.  Pt completed in room ambulation in preparation for adl tasks and household mobility w/ min/cga x 2. Pt required constant assist w/ positioning of L hand on rw. Pt returned to recliner and L ue positioned on pillows to support shoulder.  Education provided on joint protection and proper postioning w/ pt verbalizing good understanding.   (See below for objective details and assist levels).     Overall pt tolerated session well today with rest breaks.  Pt can tolerate 3 hours of therapy/ day and would benefit from intense therapy to reach highest 
Patient complains  of burning sensation while IV potassium is insitu. Patient was refusing the potassium IV. Attending provider  notified. Attending provider informed patient she is going to have 2 doses of IV potassium and will  change to oral potassium.   
Patient refused 2nd dose of IV potassium.  
Progress Note:      [facility] NEUROLOGY PROGRESS NOTE          Impression/Recommendations:   64 yo man woman  with with past medical history of brain aneurysm s/p clipping anterior communicating artery, hypertension found to have acute right MCA infarct on brain MRI. CTA head and neck with and without contrast reveals occlusion distal right M1/proximal posterior M2 division at the site of previous severe stenosis. Echo shows normal EF (October 2023). CTA neck is negative. Exam shows left arm weakness.     DAPT for 30 days (Aspirin 325 mg qd plus Plavix 75 mg qd) then switch to Plavix 75 mg qd  Atorvastatin 80 mg qd  PT/OT recommended  NPO until see by bedside swallowing or SLP evaluation.  Disposition per PT.  Neurology outpatient for stroke management.       Joanna Guardado MD  Teleneurologist    ?     SUBJECTIVE   Sally Trinh is a 63 y.o. female being evaluated for acute stroke.     ?     OBJECTIVE   ROS, PMH, FH, SH were all reviewed and are unchanged.     ?       Current Facility-Administered Medications   Medication Dose Route Frequency Provider Last Rate Last Admin    sodium chloride flush 0.9 % injection 5-40 mL  5-40 mL IntraVENous 2 times per day Ambika Tse MD   10 mL at 02/16/24 0908    sodium chloride flush 0.9 % injection 5-40 mL  5-40 mL IntraVENous PRN Ambika Tse MD        0.9 % sodium chloride infusion   IntraVENous PRN Ambika Tse MD        ondansetron (ZOFRAN-ODT) disintegrating tablet 4 mg  4 mg Oral Q8H PRN Ambika Tse MD        Or    ondansetron (ZOFRAN) injection 4 mg  4 mg IntraVENous Q6H PRN Ambika Tse MD        polyethylene glycol (GLYCOLAX) packet 17 g  17 g Oral Daily PRN Ambika Tse MD        enoxaparin (LOVENOX) injection 40 mg  40 mg SubCUTAneous Daily Ambika Tse MD   40 mg at 02/16/24 0903    atorvastatin (LIPITOR) tablet 80 mg  80 mg Oral Nightly Ambiak Tse MD   80 mg at 02/15/24 2049    labetalol 
Report called to [facility], daughter will transport.   
Spiritual Care Assessment/Progress Note  [facility]    Name: Sally Trinh MRN: [id number]    Age: 63 y.o.     Sex: female   Language: English     Date: 2/16/2024            Total Time Calculated: 10 min              Spiritual Assessment begun in [facility] [place] [facility]  Service Provided For:: Patient  Referral/Consult From:: Other (comment) (Leader Rounding)  Encounter Overview/Reason : Initial Encounter    Spiritual beliefs:      [x] Involved in a chicho tradition/spiritual practice: Antony     [] Supported by a chicho community:      [] Claims no spiritual orientation:      [] Seeking spiritual identity:           [] Adheres to an individual form of spirituality:      [] Not able to assess:                Identified resources for coping and support system:   Support System: Children, Family members       [] Prayer                  [] Devotional reading               [] Music                  [] Guided Imagery     [] Pet visits                                        [] Other: (COMMENT)     Specific area/focus of visit   Encounter:    Crisis:    Spiritual/Emotional needs: Type: Spiritual Support  Ritual, Rites and Sacraments:    Grief, Loss, and Adjustments: Type: Life Adjustments, Adjustment to illness  Ethics/Mediation:    Behavioral Health:    Palliative Care:    Advance Care Planning:      Plan/Referrals: Other (Comment) ( available upon request.)    Narrative: Patient seen by LAURA Mcege  Intern as Spiritual Health Assessment for Leader Magdalena. Patient is alone in her room resting in bed with IV and nasal canula attached. Shared reason for being in admitted to the hospital and that she is grateful for her daughter and grands. States she is her care in hospital has been as close to perfect as expected including, staff, food, room. Shared she is of Antony chicho. States she was in pain at this time. Listened empathically providing time and space for reflection and 
motion, abnormal diastolic function  - Aspirin Plavix  - Atorvastatin  - PT/OT consultation  -tele- neuro consulted, dAPT for 30 days; atorvastatin, neuro outpatient    Hypokalemia  - Supplementation given and will continue to monitor    Hypertension  - Continue , losartan,  - Start amlodipine, hydralazine as needed  - Will hold chlorthalidone due to RASHAD.    Hyperlipidemia  -Recent lipid panel shows elevated LDL, triglycerides, total.  - Continue atorvastatin    Recent nausea/vomiting  - Symptoms have resolved.  Likely source of RASHAD    RASHAD on stage III CKD  - Continue IV fluids  - Continue to monitor    Tobacco use  - Cessation education given.    Medical Decision Making:   I personally reviewed labs: BMP, CBC  I personally reviewed imaging: none  Toxic drug monitoring: Lovenox for bleeding, monitor H&H  Discussed case with: Patient, nurse, family        Code Status: Full  DVT Prophylaxis: Lovenox  GI Prophylaxis: None    Subjective:     Chief Complaint / Reason for Physician Visit  Patient examined at bedside.  Patient is having lots of pain with potassium run.  Patient has no other acute complaints at this time, still some difficulty moving her left hand and wrist.  Patient denies any chest pain, shortness of breath, abdominal pain.  Discussed with RN events overnight.       Objective:     VITALS:   Last 24hrs VS reviewed since prior progress note. Most recent are:  Patient Vitals for the past 24 hrs:   BP Temp Temp src Pulse Resp SpO2   02/17/24 0649 (!) 165/76 98.1 °F (36.7 °C) Oral 81 18 98 %   02/17/24 0346 134/76 98.1 °F (36.7 °C) Oral 70 20 98 %   02/16/24 2358 138/73 98.1 °F (36.7 °C) Oral 76 20 99 %   02/16/24 2204 (!) 154/97 -- -- -- -- --   02/16/24 1900 (!) 142/70 98 °F (36.7 °C) -- 80 18 99 %   02/16/24 1620 (!) 142/74 98 °F (36.7 °C) Oral 79 18 100 %   02/16/24 1400 120/66 -- -- -- -- --   02/16/24 1100 111/68 98 °F (36.7 °C) Oral 71 18 98 %         No intake or output data in the 24 hours ending 
current orders and MAR    YES  PMH/SH reviewed - no change compared to H&P  ________________________________________________________________________  Care Plan discussed with:    Comments   Patient x    Family      RN x    Care Manager     Consultant                        Multidiciplinary team rounds were held today with , nursing, pharmacist and clinical coordinator.  Patient's plan of care was discussed; medications were reviewed and discharge planning was addressed.     ________________________________________________________________________  Total NON critical care TIME:  35  Minutes    Total CRITICAL CARE TIME Spent:   Minutes non procedure based      Comments   >50% of visit spent in counseling and coordination of care     ________________________________________________________________________  Cheyenne Mendez PA-C     Procedures: see electronic medical records for all procedures/Xrays and details which were not copied into this note but were reviewed prior to creation of Plan.      LABS:  I reviewed today's most current labs and imaging studies.  Pertinent labs include:  Recent Labs     02/15/24  0159 02/16/24  0703   WBC 11.6* 7.0   HGB 13.3 11.9   HCT 39.1 36.3    221       Recent Labs     02/15/24  0159 02/15/24  0217 02/16/24  0703     --  141   K 3.1*  --  3.0*     --  108   CO2 31  --  28   BUN 28*  --  20   MG  --  1.8  --    ALT 27  --   --    INR 1.0  --   --          Signed: Cheyenne Mendez PA-C

## 2024-02-20 NOTE — DISCHARGE INSTR - COC
Admin  Assisted  Med Delivery   whole    Wound Care Documentation and Therapy:        Elimination:  Continence:   Bowel: Yes  Bladder: Yes  Urinary Catheter: None   Colostomy/Ileostomy/Ileal Conduit: No       Date of Last BM: ***    Intake/Output Summary (Last 24 hours) at 2024 1503  Last data filed at 2024 0900  Gross per 24 hour   Intake 660 ml   Output 500 ml   Net 160 ml     I/O last 3 completed shifts:  In: 800 [P.O.:800]  Out: 1100 [Urine:1100]    Safety Concerns:     At Risk for Falls    Impairments/Disabilities:      Speech    Nutrition Therapy:  Current Nutrition Therapy:   - Oral Diet:  General and Low Sodium (2gm)    Routes of Feeding: Oral  Liquids: Thin Liquids  Daily Fluid Restriction: no  Last Modified Barium Swallow with Video (Video Swallowing Test): not done    Treatments at the Time of Hospital Discharge:   Respiratory Treatments: ***  Oxygen Therapy:  is not on home oxygen therapy.  Ventilator:    - No ventilator support    Rehab Therapies: [facility]  Weight Bearing Status/Restrictions: { CC Weight Bearin}  Other Medical Equipment (for information only, NOT a DME order):  {EQUIPMENT:[id number]}  Other Treatments: ***    Patient's personal belongings (please select all that are sent with patient):  None    RN SIGNATURE:  Electronically signed by Jim Johnson RN on 24 at 3:06 PM EST    CASE MANAGEMENT/SOCIAL WORK SECTION    Inpatient Status Date: ***    Readmission Risk Assessment Score:  Readmission Risk              Risk of Unplanned Readmission:  18           Discharging to Facility/ Agency   Name:   Address:  Phone:  Fax:    Dialysis Facility (if applicable)   Name:  Address:  Dialysis Schedule:  Phone:  Fax:    / signature: {Esignature:[id number]}    PHYSICIAN SECTION    Prognosis: {Prognosis:8928515743}    Condition at Discharge: { Patient Condition:328385089}    Rehab Potential (if transferring to Rehab):

## 2024-03-25 ENCOUNTER — TRANSCRIBE ORDERS (OUTPATIENT)
Facility: HOSPITAL | Age: 64
End: 2024-03-25

## 2024-03-25 DIAGNOSIS — Z12.31 SCREENING MAMMOGRAM FOR HIGH-RISK PATIENT: Primary | ICD-10-CM

## 2024-08-24 ENCOUNTER — APPOINTMENT (OUTPATIENT)
Facility: HOSPITAL | Age: 64
End: 2024-08-24
Payer: COMMERCIAL

## 2024-08-24 ENCOUNTER — HOSPITAL ENCOUNTER (INPATIENT)
Facility: HOSPITAL | Age: 64
LOS: 6 days | Discharge: HOME HEALTH | End: 2024-08-30
Attending: STUDENT IN AN ORGANIZED HEALTH CARE EDUCATION/TRAINING PROGRAM | Admitting: INTERNAL MEDICINE
Payer: COMMERCIAL

## 2024-08-24 ENCOUNTER — APPOINTMENT (OUTPATIENT)
Facility: HOSPITAL | Age: 64
End: 2024-08-24
Attending: PSYCHIATRY & NEUROLOGY
Payer: COMMERCIAL

## 2024-08-24 DIAGNOSIS — R42 DIZZINESS: ICD-10-CM

## 2024-08-24 DIAGNOSIS — R29.90 STROKE-LIKE SYMPTOM: ICD-10-CM

## 2024-08-24 DIAGNOSIS — R47.81 SLURRED SPEECH: Primary | ICD-10-CM

## 2024-08-24 LAB
ALBUMIN SERPL-MCNC: 3.5 G/DL (ref 3.5–5)
ALBUMIN/GLOB SERPL: 1.2 (ref 1.1–2.2)
ALP SERPL-CCNC: 56 U/L (ref 45–117)
ALT SERPL-CCNC: 43 U/L (ref 12–78)
ANION GAP SERPL CALC-SCNC: 10 MMOL/L (ref 5–15)
AST SERPL W P-5'-P-CCNC: 27 U/L (ref 15–37)
BASOPHILS # BLD: 0 K/UL (ref 0–0.1)
BASOPHILS NFR BLD: 0 % (ref 0–1)
BILIRUB SERPL-MCNC: 0.2 MG/DL (ref 0.2–1)
BUN SERPL-MCNC: 76 MG/DL (ref 6–20)
BUN/CREAT SERPL: 46 (ref 12–20)
CA-I BLD-MCNC: 9.6 MG/DL (ref 8.5–10.1)
CHLORIDE SERPL-SCNC: 101 MMOL/L (ref 97–108)
CO2 SERPL-SCNC: 27 MMOL/L (ref 21–32)
CREAT SERPL-MCNC: 1.64 MG/DL (ref 0.55–1.02)
DIFFERENTIAL METHOD BLD: ABNORMAL
EOSINOPHIL # BLD: 0.2 K/UL (ref 0–0.4)
EOSINOPHIL NFR BLD: 1 % (ref 0–7)
ERYTHROCYTE [DISTWIDTH] IN BLOOD BY AUTOMATED COUNT: 12.8 % (ref 11.5–14.5)
FOLATE SERPL-MCNC: 19.1 NG/ML (ref 5–21)
GLOBULIN SER CALC-MCNC: 3 G/DL (ref 2–4)
GLUCOSE BLD STRIP.AUTO-MCNC: 162 MG/DL (ref 65–100)
GLUCOSE SERPL-MCNC: 143 MG/DL (ref 65–100)
HCT VFR BLD AUTO: 22.5 % (ref 35–47)
HGB BLD-MCNC: 7.6 G/DL (ref 11.5–16)
IMM GRANULOCYTES # BLD AUTO: 0.1 K/UL (ref 0–0.04)
IMM GRANULOCYTES NFR BLD AUTO: 1 % (ref 0–0.5)
INR PPP: 1.1 (ref 0.9–1.1)
IRON SATN MFR SERPL: 22 % (ref 20–50)
IRON SERPL-MCNC: 54 UG/DL (ref 35–150)
LYMPHOCYTES # BLD: 3.2 K/UL (ref 0.8–3.5)
LYMPHOCYTES NFR BLD: 24 % (ref 12–49)
MCH RBC QN AUTO: 30 PG (ref 26–34)
MCHC RBC AUTO-ENTMCNC: 33.8 G/DL (ref 30–36.5)
MCV RBC AUTO: 88.9 FL (ref 80–99)
MONOCYTES # BLD: 0.9 K/UL (ref 0–1)
MONOCYTES NFR BLD: 7 % (ref 5–13)
NEUTS SEG # BLD: 9.3 K/UL (ref 1.8–8)
NEUTS SEG NFR BLD: 67 % (ref 32–75)
NRBC # BLD: 0 K/UL (ref 0–0.01)
NRBC BLD-RTO: 0 PER 100 WBC
PERFORMED BY:: ABNORMAL
PLATELET # BLD AUTO: 257 K/UL (ref 150–400)
PMV BLD AUTO: 12.1 FL (ref 8.9–12.9)
POTASSIUM SERPL-SCNC: 3.1 MMOL/L (ref 3.5–5.1)
PROT SERPL-MCNC: 6.5 G/DL (ref 6.4–8.2)
PROTHROMBIN TIME: 14.2 SEC (ref 11.9–14.6)
RBC # BLD AUTO: 2.53 M/UL (ref 3.8–5.2)
SARS-COV-2 RNA RESP QL NAA+PROBE: DETECTED
SODIUM SERPL-SCNC: 138 MMOL/L (ref 136–145)
SPECIMEN SOURCE: ABNORMAL
TIBC SERPL-MCNC: 245 UG/DL (ref 250–450)
TROPONIN I SERPL HS-MCNC: 11 NG/L (ref 0–51)
VIT B12 SERPL-MCNC: 853 PG/ML (ref 193–986)
WBC # BLD AUTO: 13.7 K/UL (ref 3.6–11)

## 2024-08-24 PROCEDURE — 93005 ELECTROCARDIOGRAM TRACING: CPT | Performed by: STUDENT IN AN ORGANIZED HEALTH CARE EDUCATION/TRAINING PROGRAM

## 2024-08-24 PROCEDURE — 97530 THERAPEUTIC ACTIVITIES: CPT

## 2024-08-24 PROCEDURE — 99285 EMERGENCY DEPT VISIT HI MDM: CPT

## 2024-08-24 PROCEDURE — 2580000003 HC RX 258: Performed by: STUDENT IN AN ORGANIZED HEALTH CARE EDUCATION/TRAINING PROGRAM

## 2024-08-24 PROCEDURE — 96374 THER/PROPH/DIAG INJ IV PUSH: CPT

## 2024-08-24 PROCEDURE — 97161 PT EVAL LOW COMPLEX 20 MIN: CPT

## 2024-08-24 PROCEDURE — 70551 MRI BRAIN STEM W/O DYE: CPT

## 2024-08-24 PROCEDURE — 6360000002 HC RX W HCPCS: Performed by: INTERNAL MEDICINE

## 2024-08-24 PROCEDURE — 82607 VITAMIN B-12: CPT

## 2024-08-24 PROCEDURE — 6370000000 HC RX 637 (ALT 250 FOR IP): Performed by: STUDENT IN AN ORGANIZED HEALTH CARE EDUCATION/TRAINING PROGRAM

## 2024-08-24 PROCEDURE — 80053 COMPREHEN METABOLIC PANEL: CPT

## 2024-08-24 PROCEDURE — 0042T CT BRAIN PERFUSION: CPT

## 2024-08-24 PROCEDURE — 82962 GLUCOSE BLOOD TEST: CPT

## 2024-08-24 PROCEDURE — 83540 ASSAY OF IRON: CPT

## 2024-08-24 PROCEDURE — 85025 COMPLETE CBC W/AUTO DIFF WBC: CPT

## 2024-08-24 PROCEDURE — 71045 X-RAY EXAM CHEST 1 VIEW: CPT

## 2024-08-24 PROCEDURE — 82746 ASSAY OF FOLIC ACID SERUM: CPT

## 2024-08-24 PROCEDURE — 70450 CT HEAD/BRAIN W/O DYE: CPT

## 2024-08-24 PROCEDURE — 6360000004 HC RX CONTRAST MEDICATION: Performed by: STUDENT IN AN ORGANIZED HEALTH CARE EDUCATION/TRAINING PROGRAM

## 2024-08-24 PROCEDURE — 36415 COLL VENOUS BLD VENIPUNCTURE: CPT

## 2024-08-24 PROCEDURE — 1100000000 HC RM PRIVATE

## 2024-08-24 PROCEDURE — 87635 SARS-COV-2 COVID-19 AMP PRB: CPT

## 2024-08-24 PROCEDURE — 97165 OT EVAL LOW COMPLEX 30 MIN: CPT

## 2024-08-24 PROCEDURE — 99252 IP/OBS CONSLTJ NEW/EST SF 35: CPT | Performed by: PSYCHIATRY & NEUROLOGY

## 2024-08-24 PROCEDURE — 6370000000 HC RX 637 (ALT 250 FOR IP): Performed by: INTERNAL MEDICINE

## 2024-08-24 PROCEDURE — 70496 CT ANGIOGRAPHY HEAD: CPT

## 2024-08-24 PROCEDURE — 92610 EVALUATE SWALLOWING FUNCTION: CPT

## 2024-08-24 PROCEDURE — 84484 ASSAY OF TROPONIN QUANT: CPT

## 2024-08-24 PROCEDURE — 2580000003 HC RX 258: Performed by: INTERNAL MEDICINE

## 2024-08-24 PROCEDURE — 6360000002 HC RX W HCPCS

## 2024-08-24 PROCEDURE — 4A03X5D MEASUREMENT OF ARTERIAL FLOW, INTRACRANIAL, EXTERNAL APPROACH: ICD-10-PCS | Performed by: RADIOLOGY

## 2024-08-24 PROCEDURE — 85610 PROTHROMBIN TIME: CPT

## 2024-08-24 RX ORDER — ATORVASTATIN CALCIUM 40 MG/1
80 TABLET, FILM COATED ORAL NIGHTLY
Status: DISCONTINUED | OUTPATIENT
Start: 2024-08-24 | End: 2024-08-30 | Stop reason: HOSPADM

## 2024-08-24 RX ORDER — LABETALOL HYDROCHLORIDE 5 MG/ML
10 INJECTION, SOLUTION INTRAVENOUS EVERY 10 MIN PRN
Status: DISCONTINUED | OUTPATIENT
Start: 2024-08-24 | End: 2024-08-30 | Stop reason: HOSPADM

## 2024-08-24 RX ORDER — SODIUM CHLORIDE, SODIUM LACTATE, POTASSIUM CHLORIDE, CALCIUM CHLORIDE 600; 310; 30; 20 MG/100ML; MG/100ML; MG/100ML; MG/100ML
INJECTION, SOLUTION INTRAVENOUS CONTINUOUS
Status: DISPENSED | OUTPATIENT
Start: 2024-08-24 | End: 2024-08-24

## 2024-08-24 RX ORDER — ONDANSETRON 2 MG/ML
4 INJECTION INTRAMUSCULAR; INTRAVENOUS EVERY 6 HOURS PRN
Status: DISCONTINUED | OUTPATIENT
Start: 2024-08-24 | End: 2024-08-30 | Stop reason: HOSPADM

## 2024-08-24 RX ORDER — ONDANSETRON 4 MG/1
4 TABLET, ORALLY DISINTEGRATING ORAL EVERY 8 HOURS PRN
Status: DISCONTINUED | OUTPATIENT
Start: 2024-08-24 | End: 2024-08-30 | Stop reason: HOSPADM

## 2024-08-24 RX ORDER — 0.9 % SODIUM CHLORIDE 0.9 %
1000 INTRAVENOUS SOLUTION INTRAVENOUS ONCE
Status: COMPLETED | OUTPATIENT
Start: 2024-08-24 | End: 2024-08-24

## 2024-08-24 RX ORDER — DEXAMETHASONE SODIUM PHOSPHATE 10 MG/ML
6 INJECTION, SOLUTION INTRAMUSCULAR; INTRAVENOUS EVERY 24 HOURS
Status: DISCONTINUED | OUTPATIENT
Start: 2024-08-24 | End: 2024-08-24

## 2024-08-24 RX ORDER — SODIUM CHLORIDE 9 MG/ML
INJECTION, SOLUTION INTRAVENOUS PRN
Status: DISCONTINUED | OUTPATIENT
Start: 2024-08-24 | End: 2024-08-30 | Stop reason: HOSPADM

## 2024-08-24 RX ORDER — CLOPIDOGREL BISULFATE 75 MG/1
75 TABLET ORAL DAILY
Status: DISCONTINUED | OUTPATIENT
Start: 2024-08-24 | End: 2024-08-30 | Stop reason: HOSPADM

## 2024-08-24 RX ORDER — NICOTINE 21 MG/24HR
1 PATCH, TRANSDERMAL 24 HOURS TRANSDERMAL DAILY
Status: DISCONTINUED | OUTPATIENT
Start: 2024-08-24 | End: 2024-08-30 | Stop reason: HOSPADM

## 2024-08-24 RX ORDER — CLOPIDOGREL 300 MG/1
300 TABLET, FILM COATED ORAL
Status: COMPLETED | OUTPATIENT
Start: 2024-08-24 | End: 2024-08-24

## 2024-08-24 RX ORDER — ENOXAPARIN SODIUM 100 MG/ML
40 INJECTION SUBCUTANEOUS DAILY
Status: DISCONTINUED | OUTPATIENT
Start: 2024-08-24 | End: 2024-08-30 | Stop reason: HOSPADM

## 2024-08-24 RX ORDER — POTASSIUM CHLORIDE 750 MG/1
40 TABLET, EXTENDED RELEASE ORAL ONCE
Status: COMPLETED | OUTPATIENT
Start: 2024-08-24 | End: 2024-08-24

## 2024-08-24 RX ORDER — POLYETHYLENE GLYCOL 3350 17 G/17G
17 POWDER, FOR SOLUTION ORAL DAILY PRN
Status: DISCONTINUED | OUTPATIENT
Start: 2024-08-24 | End: 2024-08-30 | Stop reason: HOSPADM

## 2024-08-24 RX ORDER — ASPIRIN 325 MG
325 TABLET ORAL DAILY
Status: DISCONTINUED | OUTPATIENT
Start: 2024-08-24 | End: 2024-08-30

## 2024-08-24 RX ORDER — ASPIRIN 325 MG
325 TABLET ORAL
Status: COMPLETED | OUTPATIENT
Start: 2024-08-24 | End: 2024-08-24

## 2024-08-24 RX ORDER — SODIUM CHLORIDE 0.9 % (FLUSH) 0.9 %
5-40 SYRINGE (ML) INJECTION EVERY 12 HOURS SCHEDULED
Status: DISCONTINUED | OUTPATIENT
Start: 2024-08-24 | End: 2024-08-30 | Stop reason: HOSPADM

## 2024-08-24 RX ORDER — ASPIRIN 300 MG/1
300 SUPPOSITORY RECTAL DAILY
Status: DISCONTINUED | OUTPATIENT
Start: 2024-08-24 | End: 2024-08-24

## 2024-08-24 RX ORDER — ONDANSETRON 2 MG/ML
INJECTION INTRAMUSCULAR; INTRAVENOUS
Status: COMPLETED
Start: 2024-08-24 | End: 2024-08-24

## 2024-08-24 RX ORDER — AMLODIPINE BESYLATE 5 MG/1
10 TABLET ORAL DAILY
Status: DISCONTINUED | OUTPATIENT
Start: 2024-08-24 | End: 2024-08-30 | Stop reason: HOSPADM

## 2024-08-24 RX ORDER — SODIUM CHLORIDE 0.9 % (FLUSH) 0.9 %
5-40 SYRINGE (ML) INJECTION PRN
Status: DISCONTINUED | OUTPATIENT
Start: 2024-08-24 | End: 2024-08-30 | Stop reason: HOSPADM

## 2024-08-24 RX ORDER — IOPAMIDOL 755 MG/ML
100 INJECTION, SOLUTION INTRAVASCULAR
Status: COMPLETED | OUTPATIENT
Start: 2024-08-24 | End: 2024-08-24

## 2024-08-24 RX ADMIN — SODIUM CHLORIDE 1000 ML: 9 INJECTION, SOLUTION INTRAVENOUS at 01:33

## 2024-08-24 RX ADMIN — AMLODIPINE BESYLATE 10 MG: 5 TABLET ORAL at 22:38

## 2024-08-24 RX ADMIN — IOPAMIDOL 100 ML: 755 INJECTION, SOLUTION INTRAVENOUS at 00:57

## 2024-08-24 RX ADMIN — ONDANSETRON 4 MG: 2 INJECTION INTRAMUSCULAR; INTRAVENOUS at 01:33

## 2024-08-24 RX ADMIN — AMLODIPINE BESYLATE 10 MG: 5 TABLET ORAL at 04:48

## 2024-08-24 RX ADMIN — ENOXAPARIN SODIUM 40 MG: 100 INJECTION SUBCUTANEOUS at 09:24

## 2024-08-24 RX ADMIN — SODIUM CHLORIDE, PRESERVATIVE FREE 10 ML: 5 INJECTION INTRAVENOUS at 22:44

## 2024-08-24 RX ADMIN — ASPIRIN 325 MG: 325 TABLET ORAL at 02:07

## 2024-08-24 RX ADMIN — POTASSIUM CHLORIDE 40 MEQ: 750 TABLET, FILM COATED, EXTENDED RELEASE ORAL at 02:08

## 2024-08-24 RX ADMIN — CLOPIDOGREL BISULFATE 300 MG: 300 TABLET, FILM COATED ORAL at 02:08

## 2024-08-24 RX ADMIN — ATORVASTATIN CALCIUM 80 MG: 40 TABLET, FILM COATED ORAL at 22:37

## 2024-08-24 ASSESSMENT — PAIN SCALES - GENERAL: PAINLEVEL_OUTOF10: 5

## 2024-08-24 NOTE — CONSULTS
[facility] HEALTH NEUROLOGICAL CONSULTATION   Sushma Manda, MD; Neurohospitalist   Date of Service 8/24/2024       REASON Dizziness [R42]  Slurred speech [R47.81]  Stroke-like symptom [R29.90]    ASSESSMENT  Dizziness, multifactorial- COVID+, dehydration  Transient aphasia- resolved; TIA; R/o acute L MCA CVA    Remote R MCA CVA, residual L hemiparesis  Hyperlipidemia,   Hypertension  Intracranial atherosclerosis  Hx GENE aneurysm s/p Coil  Anxiety    R/o symptomatic b/l carotid artery stenoses    CT BRAIN PERFUSION 08/24/2024 (Preliminary)  Compared to CTA 2/15/2024  Chronic right posterior MCA territory infarction with no acute perfusion abnormality. Scattered atherosclerosis. Diffuse right vertebral atherosclerosis slightly diminutive in caliber though grossly patent. Markedly diminutive and  irregular left vertebral artery with minimal flow similar to prior study.  Extensive atherosclerosis of the right V4 vertebral artery segment. Diminutive  and irregular basilar artery with bilateral posterior communicating arteries  supplying the posterior cerebral arteries, with diffuse atherosclerosis. Diffuse  bilateral middle cerebral artery atherosclerosis. Focal area of occlusion at the  right posterior M2 origin slightly improved compared to prior study with  multifocal stenosis in the distal M2 and M3 branches. No definite acute  occlusion. 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  No venous thrombosis    Above pertinent diagnostic studies-I personally reviewed imaging and concur with the reports    PLAN  MRI brain, pending  Carotid us  Cont DAPT, statin; Monitor Hb/Hct closely  Goal -150/DBP 70-90; Avoid hypotension  Risk factor modification, smoking cessation  Nicotine patch  Hydration  Ortho VS  Telemetry  Outpatient neurology f/up with repeat CTA head in 3 months  If MRI brain and carotid us -do not show actionable findings, she is clear from neuro stand point    HISTORY OF  PRESENT ILLNESS  65 y/o F presented with daughter's c/o dizziness, slurred speech. She does not recall having speech disturbance but admits to feeling lightheaded.   Recent COVID. She is a limited historian due to underlying anxiety.   Hx R MCA CVA, residual L hemiparesis, denies any worsening.  Cr. 1.64. She is currently asymptomatic although she hasn't been up from bed in last 2 days. No headaches    PAST MEDICAL HISTORY  Past Medical History:   Diagnosis Date    Brain aneurysm     Hypertension           PHYSICAL & NEURO EXAM  BP (!) 102/48   Pulse 64   Temp 98.2 °F (36.8 °C) (Axillary)   Resp 20   Ht 1.702 m (5' 7\")   Wt 84.7 kg (186 lb 11.7 oz)   SpO2 96%   BMI 29.25 kg/m²    NAD  Head/Neck:. No meningismus.   Alert, awake and oriented x three.   Follows commands  Speech was fluent and articulate  CNs: EOMI, no nystagmus.  No facial weakness. Sensation V1-3 is intact.   Tongue protrusion is midline. No laceration  No abnormal  or involuntary movements.   Motor:  RUE  5 RLE 5 LUE 3+ LLE 4     Sensation was intact to light touch  Gait: Not tested due to fall concerns    MEDICATIONS  Current Outpatient Medications   Medication Instructions    amLODIPine (NORVASC) 10 mg, Oral, DAILY    aspirin 325 mg, Oral, DAILY    atorvastatin (LIPITOR) 80 mg, Oral, NIGHTLY    chlorthalidone (HYGROTON) 25 mg, Oral, DAILY    clopidogrel (PLAVIX) 75 mg, Oral, DAILY    losartan (COZAAR) 100 mg, Oral, DAILY    nicotine (NICODERM CQ) 21 MG/24HR 1 patch, TransDERmal, DAILY       LABS  [unfilled]    PAST SURGICAL HISTORY  Past Surgical History:   Procedure Laterality Date    ENDOVASCULAR REPAIR PERIPHERAL ANEURYSM         SOCIAL HISTORY  Social History     Tobacco Use    Smoking status: Every Day     Types: Cigarettes    Smokeless tobacco: Never   Substance Use Topics    Alcohol use: Yes     Comment: occasionally       FAMILY HISTORY  Family History   Family history unknown: Yes       ALLERGIES  Allergies   Allergen Reactions

## 2024-08-24 NOTE — ED NOTES
RN to CT, care taken over from ITZEL Kauffman.  Pt medicated with VRBO Zofran order received from MD Rashida. Tele-Neurologist currently evaluating pt.  Pt moved onto CT table and scans obtained.  Pt taken to room 14 where pt was placed on cardiac monitor, purewick was placed due to pt need to use restroom.  This RN initial NIHSS was performed at this time.

## 2024-08-24 NOTE — ED TRIAGE NOTES
LKW 2300 c/o dizziness, balance problems and speech difficulties. C/o nausea on arrival per EMS worsening confusion en route  Lasting left sided deficit from stroke in feb BGL   20G RAC    Covid +

## 2024-08-24 NOTE — CARE COORDINATION
08/24/24 1433   Service Assessment   Patient Orientation Alert and Oriented   Cognition Alert   History Provided By Patient   Primary Caregiver Self   Support Systems Children;Family Members   Patient's Healthcare Decision Maker is: Legal Next of Kin   PCP Verified by CM Yes   Last Visit to PCP Within last year   Prior Functional Level Independent in ADLs/IADLs   Current Functional Level Independent in ADLs/IADLs   Can patient return to prior living arrangement Unknown at present   Ability to make needs known: Fair   Family able to assist with home care needs: Yes   Would you like for me to discuss the discharge plan with any other family members/significant others, and if so, who? Yes  (Children)     Advance Care Planning     General Advance Care Planning (ACP) Conversation    Date of Conversation: 8/24/2024  Conducted with: Patient with Decision Making Capacity  Other persons present: None    Healthcare Decision Maker:   Primary Decision Maker: Sophia Lang - Child - [phone]    Primary Decision Maker: Roosevelt Linn - Child - [phone]    Primary Decision Maker: Earlene Linn - Child - [phone]     Today we documented Decision Maker(s) consistent with Legal Next of Kin hierarchy.    Length of Voluntary ACP Conversation in minutes:  <16 minutes (Non-Billable)    ELSY Stewart      CM met w/ pt at bedside to discuss dc planning. Pt lives at home w/ her son Roosevelt, charted above. Pt reports indep in ADLs, no dme, no hh hx. Pt was at [facility] last year at some point. Charted PCP is correct, uses [facility] for Rx. CM team to follow for dispo needs.

## 2024-08-24 NOTE — H&P
History & Physical    Primary Care Provider: Sandra Correa APRN - [facility]    Chief complaint:   Chief Complaint   Patient presents with    Dizziness    Gait Problem    Altered Mental Status        History of Presenting Illness:   Sally Trinh is a 64 y.o. female with PMH of hypertension and CVA. Patient reports recently in [facility] and was admitted for TIA (8/15-8/20). Found to have COVID-19 at that time and was treated with paxlovid. She reports noticed some dizziness started yesterday. She reports daughter noticed slurred speech however she is not aware of it. Otherwise denies facial droop, or focal/ unilateral extremities weakness. Reports has productive cough in the past few days, associated with chills. She otherwise appears anxious and confused and unable to provide more history.     In the ED, vital signs stable. CT head showed chronic right posterior MCA stroke, no acute perfusion abnormalities. CTA showed arthrosclerosis of the right vertebral artery, minimal flow to left vertebral artery. Diffuse bilateral middle cerebral artery atherosclerosis. 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 COVID-19 positive.      Chart review: none          Past Medical History:   Diagnosis Date    Brain aneurysm     Hypertension         Past Surgical History:   Procedure Laterality Date    ENDOVASCULAR REPAIR PERIPHERAL ANEURYSM         Family History   Family history unknown: Yes        Social History     Socioeconomic History    Marital status:    Tobacco Use    Smoking status: Every Day     Types: Cigarettes    Smokeless tobacco: Never   Substance and Sexual Activity    Alcohol use: Yes     Comment: occasionally     Social Determinants of Health     Food Insecurity: No Food Insecurity (8/16/2024)    Received from [facility]    Hunger Vital Sign     Worried About Running Out of Food in the Last Year: Never true     Ran Out of Food in the Last Year: Never true    Transportation Needs: No Transportation Needs (8/16/2024)    Received from [facility]     Lack of Transportation (Medical): No     Lack of Transportation (Non-Medical): No   Housing Stability: Low Risk  (2/15/2024)    Housing Stability Vital Sign     Unable to Pay for Housing in the Last Year: No     Number of Places Lived in the Last Year: 1     Unstable Housing in the Last Year: No        PTA medications and allergies per EMR.     Objective:     Physical Exam:     BP (!) 112/54   Pulse 67   Temp 97.8 °F (36.6 °C) (Oral)   Resp 15   Ht 1.702 m (5' 7\")   Wt 84.7 kg (186 lb 11.7 oz)   SpO2 98%   BMI 29.25 kg/m²         General: confused, not cooperative, anxious  Head & neck: Normocephalic, without obvious abnormality, atraumatic. Neck supple, symmetrical, trachea midline.   Eyes: Conjunctivae/corneas clear. PERRL, EOMs intact.  Oral: Lips, mucosa, and tongue normal.   Lungs: Clear to auscultation bilaterally.   Heart: Regular rate and rhythm, S1, S2 normal, no murmur, click, rub or gallop.  Abdomen: Soft, non-tender. Bowel sounds normal. No masses.  Extremities: non lower extremities edema. Extremities normal, atraumatic. Moving all four extremities.   Skin: Skin color, texture, turgor normal. No rashes or lesions  Neurologic: Omitted as patient is unable to cooperate.  No slurred speech observed.     Related lab work and imaging results reviewed in EMR.     Incidental imaging findings: None     Discussion/Medical Decision Making: Patient with numerous medical comorbidities, each with increased risk for mortality and morbidity if left untreated.   I have reviewed patient's presenting subjective and objective findings, as well as all laboratory studies, imaging studies, and vital signs to date as well as treatment rendered  and patient's response to those treatments.  In addition, prior medical, surgical and relevant social and family histories were reviewed.    I have discussed

## 2024-08-24 NOTE — ED PROVIDER NOTES
[facility] EMERGENCY DEPT  EMERGENCY DEPARTMENT HISTORY AND PHYSICAL EXAM      Date: 8/24/2024  Patient Name: Sally Trinh  MRN: [id number]  Birthdate 1960  Date of evaluation: 8/24/2024  Provider: Lio Field MD   Note Started: 3:18 AM EDT 8/24/24    HISTORY OF PRESENT ILLNESS     Chief Complaint   Patient presents with    Dizziness    Gait Problem    Altered Mental Status       History Provided By: Patient    HPI: Sally Trinh is a 64 y.o. female PMH previous stroke in February, history of brain aneurysms, hypertension, presenting with slurred speech and dizziness, acute onset, 1 hour prior to arrival.  Patient reports symptoms started at 11 PM EMS confirms symptoms started then and family saw her immediately beforehand with no symptoms.  Her speech became slurred and she reported severe dizziness.  They brought patient in as a stroke alert.  She has baseline weakness in her left arm and leg from her previous stroke in February.    EMS also reports patient was diagnosed with COVID last week 8-18    PAST MEDICAL HISTORY   Past Medical History:  Past Medical History:   Diagnosis Date    Brain aneurysm     Hypertension        Past Surgical History:  Past Surgical History:   Procedure Laterality Date    ENDOVASCULAR REPAIR PERIPHERAL ANEURYSM         Family History:  Family History   Family history unknown: Yes       Social History:  Social History     Tobacco Use    Smoking status: Every Day     Types: Cigarettes    Smokeless tobacco: Never   Substance Use Topics    Alcohol use: Yes     Comment: occasionally       Allergies:  Allergies   Allergen Reactions    Penicillins Anaphylaxis       PCP: Sandra Correa APRN - NP    Current Meds:   Current Facility-Administered Medications   Medication Dose Route Frequency Provider Last Rate Last Admin    ondansetron (ZOFRAN) injection 4 mg  4 mg IntraVENous Q6H PRN Anson Rick MD   4 mg at 08/24/24 0133    amLODIPine (NORVASC) tablet 10 mg

## 2024-08-24 NOTE — ED NOTES
ED TO INPATIENT SBAR HANDOFF    Patient Name: Sally Trinh   Preferred Name: Sally  : 1960  64 y.o.   Family/Caregiver Present: no   Code Status Order: Prior  PO Status: NPO:No  Telemetry Order:   C-SSRS: Risk of Suicide: No Risk  Sitter no     Restraints:     Sepsis Risk Score      Situation  Chief Complaint   Patient presents with    Dizziness    Gait Problem    Altered Mental Status     Brief Description of Patient's Condition: Dizziness, nausea, TIA, slurred speech  Mental Status: oriented  Arrived from:Home  Imaging:   CTA HEAD NECK W CONTRAST         CT BRAIN PERFUSION         CT HEAD WO CONTRAST   Final Result      No acute process.      Electronically signed by Uriel Tim        Abnormal labs:   Abnormal Labs Reviewed   COVID-19, RAPID - Abnormal; Notable for the following components:       Result Value    SARS-CoV-2, PCR DETECTED (*)     All other components within normal limits   CBC WITH AUTO DIFFERENTIAL - Abnormal; Notable for the following components:    WBC 13.7 (*)     RBC 2.53 (*)     Hemoglobin 7.6 (*)     Hematocrit 22.5 (*)     Immature Granulocytes % 1 (*)     Neutrophils Absolute 9.3 (*)     Immature Granulocytes Absolute 0.1 (*)     All other components within normal limits   COMPREHENSIVE METABOLIC PANEL - Abnormal; Notable for the following components:    Potassium 3.1 (*)     Glucose 143 (*)     BUN 76 (*)     Creatinine 1.64 (*)     BUN/Creatinine Ratio 46 (*)     Est, Glom Filt Rate 35 (*)     All other components within normal limits   POCT GLUCOSE - Abnormal; Notable for the following components:    POC Glucose 162 (*)     All other components within normal limits       Background  Allergies:   Allergies   Allergen Reactions    Penicillins Anaphylaxis     History:   Past Medical History:   Diagnosis Date    Brain aneurysm     Hypertension        Assessment  Vitals:    Level of Consciousness: Alert (0)   Vitals:    24 0031 24 0101 24 0134

## 2024-08-24 NOTE — PROGRESS NOTES
Hospitalist Progress Note    NAME:   Sally Trinh   : 1960   MRN: [id number]     Date/Time: 2024 12:09 PM  Patient PCP: Sandra Correa APRN - NP    Estimated discharge date:  Barriers: MRI brain, carotid US, K improvement, RASHAD resolution      Assessment / Plan:  Suspected TIA  - B/l vertebral artery sclerosis could explain patient's symptoms. Also possible recrudescence from prior CVA. Urinalysis pending  - NIHSS 0  - MRI brain and carotid ultrasound pending, if negative cleared from neurostandpoint per tele-neuro  - Lipid profile pending  - Continue PO plavix, aspirin and HI statin  - PT/OT/ST pending  - Will need outpatient neurology follow-up with repeat CTA head in 3 months     COVID pneumonia   - Diagnosed about 10 days ago. Received treatment in [facility].   - non-septic, not hypoxic, monitor for now  - WBC mildly elevated      Altered mental status  - reviewing discharge summary from recent [facility] hospitalization, patient recently has been more emotional  - psychiatric referral has been placed from previous hospitalization  - Remains emotionally labile     RASHAD on CKD stage IIIa (eGRR 45-59)   - uncertain cause. Trial of IVF  - repeat bmp pending  - urinalysis pending     Hypokalemia  - replacement given  - repeat pending     Essential hypertension   - hold losartan for now given RASHAD   - Continue other home medications.       HLP   - Continue home medications.    - lipid profile.     Code Status: FULL  DVT Prophylaxis: Lovenox  GI Prophylaxis:Not indicated    --------------------------------------------------------------------  [x] High (any 2)    A. Problems (any 1)  [x] Acute/Chronic Illness/injury posing threat to life or bodily function:  RASHAD, CVA r/o  [] Severe exacerbation of chronic illness:    ---------------------------------------------------------------------  B. Risk of Treatment (any 1)   [] Drugs/treatments that require intensive monitoring for toxicity include:

## 2024-08-24 NOTE — PLAN OF CARE
PHYSICAL THERAPY EVALUATION  Patient: Sally Trinh (64 y.o. female)  Date: 8/24/2024  Primary Diagnosis: Dizziness [R42]  Slurred speech [R47.81]  Stroke-like symptom [R29.90]       Precautions: Fall Risk                      Recommendations for nursing mobility: Frequent repositioning to prevent skin breakdown, Use of bed/chair alarm for safety, and Use of BSC for toileting     In place during session: Peripheral IV, External Catheter, and EKG/telemetry     ASSESSMENT  Pt is a 64 y.o. female admitted on 8/24/2024 for dizziness; pt currently being treated for  slurred speech. Pt semi supine  upon PT arrival, agreeable to evaluation. Pt A&O x 4.      Based on the objective data described below, the patient currently presents with impaired strength, poor body mechanics, decreased activity tolerance, poor safety awareness, impaired balance, and impaired posture. (See below for objective details and assist levels).     Overall pt tolerated session fair today with PT. Pt required min a for bed mobility and transfers. Pt amb 3 feet with PT, gt belt and MIN to mod assist; demonstrates irritability with asking questions.Patient is sensitive to louder voices,impulsive. Pt will benefit from continued skilled PT to address above deficits and return to PLOF. Potential barriers for safe discharge: pt has poor safety awareness, pt has impaired cognition, pt is a high fall risk, pt is not safe to be alone, and concern for pt safely navigating or managing the home environment. Current PT DC recommendation Skilled Nursing Facility once medically appropriate.      Problem: Physical Therapy - Adult  Goal: By Discharge: Performs mobility at highest level of function for planned discharge setting.  See evaluation for individualized goals.  Description: FUNCTIONAL STATUS PRIOR TO ADMISSION: Patient was modified independent using a single point cane for functional mobility.    HOME SUPPORT PRIOR TO ADMISSION: The patient  lived with son and was modified independent for ADLs.    Physical Therapy Goals  Initiated 8/24/2024  Pt stated goal: Get better  Pt will be I with LE HEP in 7 days.  Pt will perform bed mobility with Supervision in 7 days.  Pt will perform transfers with Supervision in 7 days.   Pt will amb 40 feet with LRAD safely with Modified [place] in 7 days.  Pt will verbalize and demonstrate compliance with fall precautions  in 7 days.   Pt will demonstrate improvement in standing/gait balance from poor/fair to fair/good in 7 days.    Outcome: Progressing        PLAN :  Recommendations and Planned Interventions: bed mobility training, transfer training, gait training, therapeutic exercises, patient and family training/education, and therapeutic activities    Recommend next PT session: EOB transfers, seated dynamic balance, standing dynamic balance, ambulation with AD , LE therex, and reinforce BEFAST    Frequency/Duration: Patient will be followed by physical therapy:  3-5x/week to address goals.    Recommendation for discharge: (in order for the patient to meet his/her long term goals)  Skilled Nursing Facility    IF patient discharges home will need the following DME: continuing to assess with progress       SUBJECTIVE:   Patient stated “Ok.”    OBJECTIVE DATA SUMMARY:   HISTORY:    Past Medical History:   Diagnosis Date    Brain aneurysm     Hypertension      Past Surgical History:   Procedure Laterality Date    ENDOVASCULAR REPAIR PERIPHERAL ANEURYSM         Home Situation:  Social/Functional History  Lives With: Son  Type of Home: House  Home Layout: One level  Home Access: Stairs to enter without rails  Entrance Stairs - Number of Steps: 2  Bathroom Shower/Tub: Walk-in shower  Bathroom Equipment: Shower chair  Has the patient had two or more falls in the past year or any fall with injury in the past year?: No  Receives Help From: Family  ADL Assistance: Needs assistance  Ambulation Assistance:

## 2024-08-24 NOTE — PROGRESS NOTES
patient currently need... Total; A Lot A Little None   1.  Putting on and taking off regular lower body clothing? []  1 [x]  2 []  3 []  4   2.  Bathing (including washing, rinsing, drying)? []  1 [x]  2 []  3 []  4   3.  Toileting, which includes using toilet, bedpan or urinal? [] 1 []  2 [x]  3 []  4   4.  Putting on and taking off regular upper body clothing? []  1 []  2 [x]  3 []  4   5.  Taking care of personal grooming such as brushing teeth? []  1 []  2 [x]  3 []  4   6.  Eating meals? []  1 []  2 []  3 [x]  4   © , Trustees of [facility], under license to [employer]. All rights reserved     Score:      Interpretation of Tool:  Represents clinically-significant functional categories (i.e. Activities of daily living).  Percentage of Impairment CH    0%   CI    1-19% CJ    20-39% CK    40-59% CL    60-79% CM    80-99% CN     100%   [facility]  Score 6-24 24 23 20-22 15-19 10-14 7-9 6     Occupational Therapy Evaluation Charge Determination   History Examination Decision-Making   LOW Complexity : Brief history review  MEDIUM Complexity: 3-5 Performance deficits relating to physical, cognitive, or psychosocial skills that result in activity limitations and/or participation restrictions MEDIUM Complexity: Patient may present with comorbidities that affect occupational performance. Minimal to moderate modifications of tasks or assist (eg. physical or verbal) with assist is necessary to enable pt to complete eval      Based on the above components, the patient evaluation is determined to be of the following complexity level: Low    Pain Ratin/10   Pain Intervention(s):   pain is at a level acceptable to the patient    Activity Tolerance:   Fair  and requires rest breaks    After treatment patient left in no apparent distress:    Patient left in no apparent distress in bed, Call bell within reach, Bed/ chair alarm activated, Caregiver / family present, and Side rails x3, bed locked and in lowest

## 2024-08-24 NOTE — PROGRESS NOTES
Speech LAnguage Pathology Dysphagia EVALUATION/DISCHARGE    Patient: Sally Trinh (64 y.o. female)  Date: 8/24/2024  Primary Diagnosis: Dizziness [R42]  Slurred speech [R47.81]  Stroke-like symptom [R29.90]       Precautions:  Fall Risk                  ASSESSMENT :  Based on the objective data described below, the patient presents with swallow within functional limits. Patient positioned upright, alert, oriented x3, and agreeable to evaluation. Patient tearful upon entering the room. She was requesting for her son and wanting to call him. SLP notified nursing. SLP provided her with cookies and apple juice. Nurse reports she made her NPO due to her presenting with double swallows and it appearing like she was having difficulty swallowing.    Oral and pharyngeal phases: grossly intact, good mastication and manipulation of bolus, timely swallows, good HLE and no overt s/s aspiration. She was able to feed herself without difficulty. Patient edentulous; she has dentures but not here at hospital. However, she reported she eats with and without her dentures;     Patient will be discharged from skilled speech-language pathology services at this time.     PLAN :  Recommendations and Planned Interventions:  Diet: Regular and thin liquids  Swallow within functional limits. No further skilled ST services are warranted at this time.     Acute SLP Services: No, patient will be discharged from acute skilled speech-language pathology at this time.    Discharge Recommendations: None     SUBJECTIVE:   I want to call my son.    OBJECTIVE:     Past Medical History:   Diagnosis Date    Brain aneurysm     Hypertension      Past Surgical History:   Procedure Laterality Date    ENDOVASCULAR REPAIR PERIPHERAL ANEURYSM       Prior Level of Function/Home Situation:   Social/Functional History  Lives With: Son  Type of Home: House  Home Layout: One level  Home Access: Stairs to enter without rails  Entrance Stairs - Number of

## 2024-08-25 ENCOUNTER — APPOINTMENT (OUTPATIENT)
Facility: HOSPITAL | Age: 64
End: 2024-08-25
Attending: PSYCHIATRY & NEUROLOGY
Payer: COMMERCIAL

## 2024-08-25 LAB
ANION GAP SERPL CALC-SCNC: 5 MMOL/L (ref 5–15)
APPEARANCE UR: CLEAR
BACTERIA URNS QL MICRO: NEGATIVE /HPF
BASOPHILS # BLD: 0 K/UL (ref 0–0.1)
BASOPHILS NFR BLD: 0 % (ref 0–1)
BILIRUB UR QL: NEGATIVE
BUN SERPL-MCNC: 50 MG/DL (ref 6–20)
BUN/CREAT SERPL: 42 (ref 12–20)
CA-I BLD-MCNC: 9.3 MG/DL (ref 8.5–10.1)
CAOX CRY URNS QL MICRO: ABNORMAL
CHLORIDE SERPL-SCNC: 109 MMOL/L (ref 97–108)
CHOLEST SERPL-MCNC: 121 MG/DL
CO2 SERPL-SCNC: 25 MMOL/L (ref 21–32)
COLOR UR: ABNORMAL
CREAT SERPL-MCNC: 1.2 MG/DL (ref 0.55–1.02)
DIFFERENTIAL METHOD BLD: ABNORMAL
EKG ATRIAL RATE: 72 BPM
EKG DIAGNOSIS: NORMAL
EKG P AXIS: 21 DEGREES
EKG P-R INTERVAL: 228 MS
EKG Q-T INTERVAL: 400 MS
EKG QRS DURATION: 100 MS
EKG QTC CALCULATION (BAZETT): 438 MS
EKG R AXIS: 10 DEGREES
EKG T AXIS: 258 DEGREES
EKG VENTRICULAR RATE: 72 BPM
EOSINOPHIL # BLD: 0.1 K/UL (ref 0–0.4)
EOSINOPHIL NFR BLD: 1 % (ref 0–7)
EPITH CASTS URNS QL MICRO: ABNORMAL /LPF
ERYTHROCYTE [DISTWIDTH] IN BLOOD BY AUTOMATED COUNT: 13.3 % (ref 11.5–14.5)
GLUCOSE BLD STRIP.AUTO-MCNC: 239 MG/DL (ref 65–100)
GLUCOSE BLD STRIP.AUTO-MCNC: 245 MG/DL (ref 65–100)
GLUCOSE SERPL-MCNC: 173 MG/DL (ref 65–100)
GLUCOSE UR STRIP.AUTO-MCNC: 50 MG/DL
HCT VFR BLD AUTO: 18.5 % (ref 35–47)
HDLC SERPL-MCNC: 33 MG/DL
HDLC SERPL: 3.7 (ref 0–5)
HGB BLD-MCNC: 6 G/DL (ref 11.5–16)
HGB UR QL STRIP: ABNORMAL
IMM GRANULOCYTES # BLD AUTO: 0.1 K/UL (ref 0–0.04)
IMM GRANULOCYTES NFR BLD AUTO: 1 % (ref 0–0.5)
KETONES UR QL STRIP.AUTO: NEGATIVE MG/DL
LDLC SERPL CALC-MCNC: 49.8 MG/DL (ref 0–100)
LEUKOCYTE ESTERASE UR QL STRIP.AUTO: NEGATIVE
LIPID PANEL: ABNORMAL
LYMPHOCYTES # BLD: 2.5 K/UL (ref 0.8–3.5)
LYMPHOCYTES NFR BLD: 17 % (ref 12–49)
MCH RBC QN AUTO: 30 PG (ref 26–34)
MCHC RBC AUTO-ENTMCNC: 32.4 G/DL (ref 30–36.5)
MCV RBC AUTO: 92.5 FL (ref 80–99)
MONOCYTES # BLD: 1.3 K/UL (ref 0–1)
MONOCYTES NFR BLD: 9 % (ref 5–13)
MUCOUS THREADS URNS QL MICRO: NEGATIVE /LPF
NEUTS SEG # BLD: 10.7 K/UL (ref 1.8–8)
NEUTS SEG NFR BLD: 72 % (ref 32–75)
NITRITE UR QL STRIP.AUTO: NEGATIVE
NRBC # BLD: 0 K/UL (ref 0–0.01)
NRBC BLD-RTO: 0 PER 100 WBC
PERFORMED BY:: ABNORMAL
PERFORMED BY:: ABNORMAL
PH UR STRIP: 5 (ref 5–8)
PLATELET # BLD AUTO: 246 K/UL (ref 150–400)
PMV BLD AUTO: 11.9 FL (ref 8.9–12.9)
POTASSIUM SERPL-SCNC: 3.3 MMOL/L (ref 3.5–5.1)
PROT UR STRIP-MCNC: NEGATIVE MG/DL
RBC # BLD AUTO: 2 M/UL (ref 3.8–5.2)
RBC #/AREA URNS HPF: ABNORMAL /HPF (ref 0–5)
RBC MORPH BLD: ABNORMAL
SODIUM SERPL-SCNC: 139 MMOL/L (ref 136–145)
SP GR UR REFRACTOMETRY: 1.01 (ref 1–1.03)
TRIGL SERPL-MCNC: 191 MG/DL
URINE CULTURE IF INDICATED: ABNORMAL
UROBILINOGEN UR QL STRIP.AUTO: 0.1 EU/DL (ref 0.1–1)
VLDLC SERPL CALC-MCNC: 38.2 MG/DL
WBC # BLD AUTO: 14.7 K/UL (ref 3.6–11)
WBC URNS QL MICRO: ABNORMAL /HPF (ref 0–4)

## 2024-08-25 PROCEDURE — 6370000000 HC RX 637 (ALT 250 FOR IP): Performed by: PHYSICIAN ASSISTANT

## 2024-08-25 PROCEDURE — 83735 ASSAY OF MAGNESIUM: CPT

## 2024-08-25 PROCEDURE — 80048 BASIC METABOLIC PNL TOTAL CA: CPT

## 2024-08-25 PROCEDURE — 85025 COMPLETE CBC W/AUTO DIFF WBC: CPT

## 2024-08-25 PROCEDURE — 1100000000 HC RM PRIVATE

## 2024-08-25 PROCEDURE — 36415 COLL VENOUS BLD VENIPUNCTURE: CPT

## 2024-08-25 PROCEDURE — 80061 LIPID PANEL: CPT

## 2024-08-25 PROCEDURE — 6370000000 HC RX 637 (ALT 250 FOR IP): Performed by: INTERNAL MEDICINE

## 2024-08-25 PROCEDURE — 6360000002 HC RX W HCPCS: Performed by: INTERNAL MEDICINE

## 2024-08-25 PROCEDURE — 93880 EXTRACRANIAL BILAT STUDY: CPT

## 2024-08-25 PROCEDURE — 82962 GLUCOSE BLOOD TEST: CPT

## 2024-08-25 PROCEDURE — 2580000003 HC RX 258: Performed by: INTERNAL MEDICINE

## 2024-08-25 PROCEDURE — 81001 URINALYSIS AUTO W/SCOPE: CPT

## 2024-08-25 RX ORDER — ZIPRASIDONE MESYLATE 20 MG/ML
20 INJECTION, POWDER, LYOPHILIZED, FOR SOLUTION INTRAMUSCULAR ONCE
Status: COMPLETED | OUTPATIENT
Start: 2024-08-25 | End: 2024-08-25

## 2024-08-25 RX ORDER — POTASSIUM CHLORIDE 1500 MG/1
40 TABLET, EXTENDED RELEASE ORAL ONCE
Status: COMPLETED | OUTPATIENT
Start: 2024-08-25 | End: 2024-08-25

## 2024-08-25 RX ORDER — SODIUM CHLORIDE 9 MG/ML
INJECTION, SOLUTION INTRAVENOUS PRN
Status: DISCONTINUED | OUTPATIENT
Start: 2024-08-25 | End: 2024-08-30 | Stop reason: HOSPADM

## 2024-08-25 RX ORDER — ACETAMINOPHEN 325 MG/1
650 TABLET ORAL EVERY 4 HOURS PRN
Status: DISCONTINUED | OUTPATIENT
Start: 2024-08-25 | End: 2024-08-30 | Stop reason: HOSPADM

## 2024-08-25 RX ADMIN — SODIUM CHLORIDE, PRESERVATIVE FREE 10 ML: 5 INJECTION INTRAVENOUS at 10:44

## 2024-08-25 RX ADMIN — ASPIRIN 325 MG: 325 TABLET ORAL at 09:35

## 2024-08-25 RX ADMIN — ACETAMINOPHEN 650 MG: 325 TABLET ORAL at 21:11

## 2024-08-25 RX ADMIN — POTASSIUM CHLORIDE 40 MEQ: 1500 TABLET, EXTENDED RELEASE ORAL at 09:35

## 2024-08-25 RX ADMIN — CLOPIDOGREL BISULFATE 75 MG: 75 TABLET ORAL at 09:35

## 2024-08-25 RX ADMIN — ACETAMINOPHEN 650 MG: 325 TABLET ORAL at 14:01

## 2024-08-25 RX ADMIN — ZIPRASIDONE MESYLATE 20 MG: 20 INJECTION, POWDER, LYOPHILIZED, FOR SOLUTION INTRAMUSCULAR at 02:06

## 2024-08-25 RX ADMIN — ATORVASTATIN CALCIUM 80 MG: 40 TABLET, FILM COATED ORAL at 21:11

## 2024-08-25 ASSESSMENT — PAIN SCALES - WONG BAKER: WONGBAKER_NUMERICALRESPONSE: NO HURT

## 2024-08-25 ASSESSMENT — PAIN DESCRIPTION - LOCATION
LOCATION: NECK;HIP
LOCATION: HIP

## 2024-08-25 ASSESSMENT — PAIN DESCRIPTION - ORIENTATION
ORIENTATION: LEFT
ORIENTATION: LEFT

## 2024-08-25 ASSESSMENT — PAIN DESCRIPTION - DESCRIPTORS: DESCRIPTORS: SHOOTING;STABBING

## 2024-08-25 ASSESSMENT — PAIN SCALES - GENERAL
PAINLEVEL_OUTOF10: 10
PAINLEVEL_OUTOF10: 0
PAINLEVEL_OUTOF10: 8

## 2024-08-25 NOTE — CONSENT
Informed Consent for Blood Component Transfusion Note    I have discussed with the patient and son the rationale for blood component transfusion; its benefits in treating or preventing fatigue, organ damage, or death; and its risk which includes mild transfusion reactions, rare risk of blood borne infection, or more serious but rare reactions. I have discussed the alternatives to transfusion, including the risk and consequences of not receiving transfusion. The patient and son had an opportunity to ask questions and had agreed to proceed with transfusion of blood components.    Patient in the past declined blood transfusions for unclear reason. Discussed with patient and via telephone with son Roosevelt, who is medical decision maker, and he stated he would like patient to receive unit of blood.  Patient is amenable.    Electronically signed by Santiago Eugene PA-C on 8/25/24 at 9:00 AM EDT

## 2024-08-25 NOTE — PROGRESS NOTES
Hospitalist Progress Note    NAME:   Sally Porter-Kaveh   : 1960   MRN: [id number]     Date/Time: 2024 10:02 AM  Patient PCP: Sandra Correa APRN - NP    Estimated discharge date: 48  Barriers: Hgb stabilization, K improvement, RASHAD resolution, SNF, carotid US results, vascular surgery consult      Assessment / Plan:  Possible TIA versus recrudescence versus dehydration  - CTA head neck with new short segment lesion of right proximal vertebral artery which is patent distally, progressive severe atherosclerosis of the basilar artery with high-grade stenosis, recanalization of but severe stenosis in proximal posterior division M2 segment right MCA, moderate to severe atherosclerotic changes stable, s/p coil embolization  - Vascular surgery consult in a.m. when available  - MRI brain with no acute infarct, did show evolution of chronic right posterior MCA territory infarct and unchanged other chronic infarcts  - NIHSS 0  - Lipid profile pending  - Continue PO plavix, aspirin and HI statin  - PT/OT recommending SNF  - SLP signed off  - Will need outpatient neurology follow-up with repeat CTA head in 3 months    Anemia, unspecified  - Hemoglobin significantly reduced from 7.6-6.0, possibly dilutional from IVF  - Per chart review patient refuses blood, again refused blood despite explaining risks including cardiac arrhythmias, decompensation, death  - Unclear if patient is medically competent to make decision, psychiatric consult placed  - Discussed via telephone with laura Albert, medical decision maker, who would like patient to receive blood  - Iron studies, B12 and folate without indication for repletion  - FOBT, consider GI consult if positive.  Patient denies hematochezia or melena.     COVID pneumonia   - Received treatment at [facility], symptom onset was 8/15   - non-septic, not hypoxic, monitor for now  - WBC mildly worsening however CXR with no acute process, UA pending     AMS versus   She remains emotionally labile, unable to focus on conversation.  She declined blood transfusion despite explaining risks of cardiac arrhythmias, decompensation, poor perfusion, death.  Discussed via telephone with son Roosevelt.  Objective:     VITALS:   Last 24hrs VS reviewed since prior progress note. Most recent are:  Patient Vitals for the past 24 hrs:   BP Temp Temp src Pulse Resp SpO2   08/25/24 0854 (!) 118/58 98.2 °F (36.8 °C) Oral (!) 102 20 100 %   08/24/24 2238 (!) 118/57 98.1 °F (36.7 °C) Oral 79 19 100 %   08/24/24 1853 (!) 125/55 98.2 °F (36.8 °C) Oral 87 20 100 %   08/24/24 1520 (!) 117/47 98.4 °F (36.9 °C) Axillary 78 18 96 %   08/24/24 1510 116/75 98.2 °F (36.8 °C) Oral 89 17 96 %   08/24/24 1105 122/76 98.4 °F (36.9 °C) Axillary 54 20 95 %         Intake/Output Summary (Last 24 hours) at 8/25/2024 1002  Last data filed at 8/25/2024 0453  Gross per 24 hour   Intake 10 ml   Output 2250 ml   Net -2240 ml        I had a face to face encounter and independently examined this patient on 8/25/2024, as outlined below:    Review of Systems   Reason unable to perform ROS: emotionally labile.   Neurological:  Negative for dizziness, weakness, light-headedness and numbness.        PHYSICAL EXAM:  Physical Exam  Constitutional:       General: She is not in acute distress.     Appearance: Normal appearance. She is not ill-appearing.   Cardiovascular:      Rate and Rhythm: Normal rate and regular rhythm.      Pulses: Normal pulses.   Pulmonary:      Effort: Pulmonary effort is normal. No respiratory distress.      Breath sounds: Normal breath sounds. No wheezing.   Abdominal:      General: Abdomen is flat. There is no distension.      Palpations: Abdomen is soft.      Tenderness: There is no abdominal tenderness.   Musculoskeletal:         General: No swelling or tenderness. Normal range of motion.   Skin:     General: Skin is warm and dry.   Neurological:      Mental Status: She is alert and oriented to person,

## 2024-08-26 LAB
ALBUMIN SERPL-MCNC: 3.3 G/DL (ref 3.5–5)
ALBUMIN/GLOB SERPL: 1 (ref 1.1–2.2)
ALP SERPL-CCNC: 54 U/L (ref 45–117)
ALT SERPL-CCNC: 32 U/L (ref 12–78)
ANION GAP SERPL CALC-SCNC: 6 MMOL/L (ref 5–15)
AST SERPL W P-5'-P-CCNC: 20 U/L (ref 15–37)
BASOPHILS # BLD: 0.1 K/UL (ref 0–0.1)
BASOPHILS NFR BLD: 0 % (ref 0–1)
BILIRUB SERPL-MCNC: 0.2 MG/DL (ref 0.2–1)
BUN SERPL-MCNC: 29 MG/DL (ref 6–20)
BUN/CREAT SERPL: 26 (ref 12–20)
CA-I BLD-MCNC: 9.4 MG/DL (ref 8.5–10.1)
CHLORIDE SERPL-SCNC: 112 MMOL/L (ref 97–108)
CO2 SERPL-SCNC: 24 MMOL/L (ref 21–32)
CREAT SERPL-MCNC: 1.13 MG/DL (ref 0.55–1.02)
DIFFERENTIAL METHOD BLD: ABNORMAL
ECHO BSA: 2 M2
EOSINOPHIL # BLD: 0.3 K/UL (ref 0–0.4)
EOSINOPHIL NFR BLD: 2 % (ref 0–7)
ERYTHROCYTE [DISTWIDTH] IN BLOOD BY AUTOMATED COUNT: 13.5 % (ref 11.5–14.5)
GLOBULIN SER CALC-MCNC: 3.2 G/DL (ref 2–4)
GLUCOSE BLD STRIP.AUTO-MCNC: 187 MG/DL (ref 65–100)
GLUCOSE SERPL-MCNC: 155 MG/DL (ref 65–100)
HCT VFR BLD AUTO: 17.4 % (ref 35–47)
HGB BLD-MCNC: 5.7 G/DL (ref 11.5–16)
IMM GRANULOCYTES # BLD AUTO: 0.1 K/UL (ref 0–0.04)
IMM GRANULOCYTES NFR BLD AUTO: 1 % (ref 0–0.5)
LYMPHOCYTES # BLD: 3.6 K/UL (ref 0.8–3.5)
LYMPHOCYTES NFR BLD: 27 % (ref 12–49)
MAGNESIUM SERPL-MCNC: 1.9 MG/DL (ref 1.6–2.4)
MCH RBC QN AUTO: 30.5 PG (ref 26–34)
MCHC RBC AUTO-ENTMCNC: 32.8 G/DL (ref 30–36.5)
MCV RBC AUTO: 93 FL (ref 80–99)
MONOCYTES # BLD: 1.3 K/UL (ref 0–1)
MONOCYTES NFR BLD: 10 % (ref 5–13)
NEUTS SEG # BLD: 8.1 K/UL (ref 1.8–8)
NEUTS SEG NFR BLD: 60 % (ref 32–75)
NRBC # BLD: 0 K/UL (ref 0–0.01)
NRBC BLD-RTO: 0 PER 100 WBC
PERFORMED BY:: ABNORMAL
PLATELET # BLD AUTO: 279 K/UL (ref 150–400)
PMV BLD AUTO: 11.8 FL (ref 8.9–12.9)
POTASSIUM SERPL-SCNC: 3.6 MMOL/L (ref 3.5–5.1)
PROCALCITONIN SERPL-MCNC: 0.05 NG/ML
PROT SERPL-MCNC: 6.5 G/DL (ref 6.4–8.2)
RBC # BLD AUTO: 1.87 M/UL (ref 3.8–5.2)
SODIUM SERPL-SCNC: 142 MMOL/L (ref 136–145)
VAS LEFT CCA DIST EDV: 22.3 CM/S
VAS LEFT CCA DIST PSV: 116 CM/S
VAS LEFT CCA PROX EDV: 14.5 CM/S
VAS LEFT CCA PROX PSV: 137 CM/S
VAS LEFT ECA EDV: 12.6 CM/S
VAS LEFT ECA PSV: 144 CM/S
VAS LEFT ICA DIST EDV: 40.7 CM/S
VAS LEFT ICA DIST PSV: 123 CM/S
VAS LEFT ICA PROX EDV: 23.2 CM/S
VAS LEFT ICA PROX PSV: 117 CM/S
VAS LEFT ICA/CCA PSV: 1.05
VAS LEFT SUBCLAVIAN PROX EDV: 17.4 CM/S
VAS LEFT SUBCLAVIAN PROX PSV: 142 CM/S
VAS LEFT VERTEBRAL EDV: 7.84 CM/S
VAS LEFT VERTEBRAL PSV: 27.9 CM/S
VAS RIGHT CCA DIST EDV: 14.3 CM/S
VAS RIGHT CCA DIST PSV: 132 CM/S
VAS RIGHT CCA PROX EDV: 19.8 CM/S
VAS RIGHT CCA PROX PSV: 105 CM/S
VAS RIGHT ECA EDV: 17.3 CM/S
VAS RIGHT ECA PSV: 208 CM/S
VAS RIGHT ICA DIST EDV: 43.3 CM/S
VAS RIGHT ICA DIST PSV: 121 CM/S
VAS RIGHT ICA PROX EDV: 23.4 CM/S
VAS RIGHT ICA PROX PSV: 116 CM/S
VAS RIGHT ICA/CCA PSV: 0.92
VAS RIGHT SUBCLAVIAN PROX PSV: 199 CM/S
VAS RIGHT VERTEBRAL PSV: 61.5 CM/S
WBC # BLD AUTO: 13.5 K/UL (ref 3.6–11)

## 2024-08-26 PROCEDURE — 6370000000 HC RX 637 (ALT 250 FOR IP): Performed by: INTERNAL MEDICINE

## 2024-08-26 PROCEDURE — 86923 COMPATIBILITY TEST ELECTRIC: CPT

## 2024-08-26 PROCEDURE — 82962 GLUCOSE BLOOD TEST: CPT

## 2024-08-26 PROCEDURE — 6370000000 HC RX 637 (ALT 250 FOR IP): Performed by: PHYSICIAN ASSISTANT

## 2024-08-26 PROCEDURE — 84145 PROCALCITONIN (PCT): CPT

## 2024-08-26 PROCEDURE — 86900 BLOOD TYPING SEROLOGIC ABO: CPT

## 2024-08-26 PROCEDURE — 2580000003 HC RX 258: Performed by: INTERNAL MEDICINE

## 2024-08-26 PROCEDURE — 99222 1ST HOSP IP/OBS MODERATE 55: CPT | Performed by: SURGERY

## 2024-08-26 PROCEDURE — 93880 EXTRACRANIAL BILAT STUDY: CPT | Performed by: SURGERY

## 2024-08-26 PROCEDURE — 36415 COLL VENOUS BLD VENIPUNCTURE: CPT

## 2024-08-26 PROCEDURE — 1100000000 HC RM PRIVATE

## 2024-08-26 PROCEDURE — 30233N1 TRANSFUSION OF NONAUTOLOGOUS RED BLOOD CELLS INTO PERIPHERAL VEIN, PERCUTANEOUS APPROACH: ICD-10-PCS | Performed by: INTERNAL MEDICINE

## 2024-08-26 PROCEDURE — 36430 TRANSFUSION BLD/BLD COMPNT: CPT

## 2024-08-26 PROCEDURE — 86901 BLOOD TYPING SEROLOGIC RH(D): CPT

## 2024-08-26 PROCEDURE — 80053 COMPREHEN METABOLIC PANEL: CPT

## 2024-08-26 PROCEDURE — P9016 RBC LEUKOCYTES REDUCED: HCPCS

## 2024-08-26 PROCEDURE — 85025 COMPLETE CBC W/AUTO DIFF WBC: CPT

## 2024-08-26 PROCEDURE — 86850 RBC ANTIBODY SCREEN: CPT

## 2024-08-26 RX ORDER — PANTOPRAZOLE SODIUM 40 MG/1
40 TABLET, DELAYED RELEASE ORAL
Status: DISCONTINUED | OUTPATIENT
Start: 2024-08-27 | End: 2024-08-30 | Stop reason: HOSPADM

## 2024-08-26 RX ORDER — SODIUM CHLORIDE 9 MG/ML
INJECTION, SOLUTION INTRAVENOUS PRN
Status: DISCONTINUED | OUTPATIENT
Start: 2024-08-26 | End: 2024-08-30 | Stop reason: HOSPADM

## 2024-08-26 RX ADMIN — ACETAMINOPHEN 650 MG: 325 TABLET ORAL at 20:43

## 2024-08-26 RX ADMIN — ACETAMINOPHEN 650 MG: 325 TABLET ORAL at 12:18

## 2024-08-26 RX ADMIN — SODIUM CHLORIDE, PRESERVATIVE FREE 10 ML: 5 INJECTION INTRAVENOUS at 20:45

## 2024-08-26 RX ADMIN — SODIUM CHLORIDE, PRESERVATIVE FREE 10 ML: 5 INJECTION INTRAVENOUS at 09:00

## 2024-08-26 RX ADMIN — ATORVASTATIN CALCIUM 80 MG: 40 TABLET, FILM COATED ORAL at 20:43

## 2024-08-26 ASSESSMENT — PAIN SCALES - GENERAL
PAINLEVEL_OUTOF10: 0
PAINLEVEL_OUTOF10: 9
PAINLEVEL_OUTOF10: 10
PAINLEVEL_OUTOF10: 0

## 2024-08-26 ASSESSMENT — PAIN DESCRIPTION - ORIENTATION
ORIENTATION: LEFT
ORIENTATION: LEFT

## 2024-08-26 ASSESSMENT — PAIN DESCRIPTION - LOCATION
LOCATION: SHOULDER
LOCATION: HIP

## 2024-08-26 ASSESSMENT — PAIN DESCRIPTION - DESCRIPTORS
DESCRIPTORS: ACHING
DESCRIPTORS: THROBBING;STABBING

## 2024-08-26 NOTE — PROGRESS NOTES
Received call from Laboratory staffPapi; Critical hemoglobin 5.7 and hct 17.4. Type and screen obtained this AM.

## 2024-08-26 NOTE — PROGRESS NOTES
Comprehensive Nutrition Assessment    Type and Reason for Visit:  Initial, Positive Nutrition Screen    Nutrition Recommendations/Plan:   Continue current diet order, encourage po intake  RD to monitor weight, po intake, labs, GI function     Malnutrition Assessment:  Malnutrition Status:  Insufficient data (08/26/24 2054)    Context:  Acute Illness     Findings of the 6 clinical characteristics of malnutrition:  Energy Intake:  Mild decrease in energy intake (Comment)  Weight Loss:  No significant weight loss     Body Fat Loss:  Unable to assess     Muscle Mass Loss:  Unable to assess    Fluid Accumulation:  Unable to assess     Strength:  Not Performed    Nutrition Assessment:    Pt assessed for MST 2- weight loss and poor appetite prior to admission. Pt admitted for dizziness TIA vs. CVA, covid-19. Pt assessed by SLP and approved for regular/thin diet. Pt currently ordered a low sodium diet. Meds: lipitor, protonix. Labs: Cl 112 H, BUN 29 H, Creat 1.13 H, Glucose 155 H.    Nutrition Related Findings:    PO intakes 51-75%. No BM documeted this admission. Wound Type: None       Current Nutrition Intake & Therapies:    Average Meal Intake: 51-75%  Average Supplements Intake: None Ordered  ADULT DIET; Regular; Low Sodium (2 gm)    Anthropometric Measures:  Height: 170.2 cm (5' 7\")  Ideal Body Weight (IBW): 135 lbs (61 kg)       Current Body Weight: 84.4 kg (186 lb), 137.8 % IBW. Weight Source: Standing Scale  Current BMI (kg/m2): 29.1        Weight Adjustment For: No Adjustment     BMI Categories: Overweight (BMI 25.0-29.9)  Last Weight Metrics:      8/26/2024     7:55 PM 8/24/2024     1:34 AM 2/15/2024     1:51 AM 10/3/2023     4:00 AM 10/2/2023    10:52 AM 10/2/2023     5:45 AM 10/1/2023     6:04 AM   Weight Loss Metrics   Height 5' 7\" 5' 7\" 5' 7\"  5' 7\"     Weight - Scale  186 lbs 12 oz 160 lbs 183 lbs 14 oz 185 lbs 185 lbs 10 oz 185 lbs 6 oz   BMI (Calculated)  29.3 kg/m2 25.1 kg/m2 28.9 kg/m2 29 kg/m2 29.1  kg/m2 29.1 kg/m2      Estimated Daily Nutrient Needs:  Energy Requirements Based On: Kcal/kg  Weight Used for Energy Requirements: Current  Energy (kcal/day): 2100-2520kcals (25-30kcal/kg)  Weight Used for Protein Requirements: Current  Protein (g/day): 67-100g (0.8-1.0g/kg)  Method Used for Fluid Requirements: 1 ml/kcal  Fluid (ml/day):      Nutrition Diagnosis:   Predicted inadequate energy intake related to cognitive or neurological impairment, acute injury/trauma as evidenced by intake 51-75%    Nutrition Interventions:   Food and/or Nutrient Delivery: Continue Current Diet  Nutrition Education/Counseling: No recommendation at this time  Coordination of Nutrition Care: Continue to monitor while inpatient       Goals:     Goals: Meet at least 75% of estimated needs, by next RD assessment       Nutrition Monitoring and Evaluation:   Behavioral-Environmental Outcomes: None Identified  Food/Nutrient Intake Outcomes: Food and Nutrient Intake  Physical Signs/Symptoms Outcomes: Biochemical Data, GI Status, Weight    Discharge Planning:    Too soon to determine     Allyssa Schulz RD  Contact: jazmin

## 2024-08-26 NOTE — PROGRESS NOTES
Patient pulled out IV with cath intact; refused IV until son arrives. Able to obtain labs @ this time.  Refused to wear telemetry box. Intermittent confusion but follows commands. Will report off to receiving nurse.

## 2024-08-26 NOTE — CARE COORDINATION
DC Plan: Disposition pending    Cm met with pt and pt's son - Roosevelt at the bedside. PT is recommending SNF. Son indicated pt's been to [facility] and [facility]. Cm explained to son PT is recommending SNF level of care. CM presented SNF choice list. Son will look over SNF list. Son is agreeable with writer following up with him tomorrow for a decision.

## 2024-08-26 NOTE — PROGRESS NOTES
attempted to visit with the patient in Room 587. At the time of the attempt, the patient was being cared for by her nurse and was tearful.  She declined to speak with the  but communicated that we could speak with her son, should any opportunities allow.  He had left  her room to take care of some business and  went downstairs.     informed patient that we are available as needed.    Chaplain Bernie Ramos M.Div.

## 2024-08-26 NOTE — PROGRESS NOTES
Hospitalist Progress Note    NAME:   Sally Linn   : 1960   MRN: [id number]     Date/Time: 2024 9:27 AM  Patient PCP: Sandra Correa APRN - NP    Estimated discharge date:48 hours  Barriers: stable hgb, stool occult, psych consult    Hospital course:  Sally Trinh is a 64 y.o. female with PMH of hypertension and CVA. Patient recently at [facility] and was admitted for TIA (8/15-). Found to have COVID-19 at that time and was treated with paxlovid. Initially presented to our ED for dizziness. CT head showed chronic right posterior MCA stroke, no acute perfusion abnormalities. CTA showed arthrosclerosis of the right vertebral artery, minimal flow to left vertebral artery. Diffuse bilateral middle cerebral artery atherosclerosis. 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 COVID-19 positive.  Patient was admitted to the hospital for further evaluation and management.  DAPT, high-dose statin started.  Neurology consulted. CTA head neck with new short segment lesion of right proximal vertebral artery which is patent distally, progressive severe atherosclerosis of the basilar artery with high-grade stenosis, recanalization of but severe stenosis in proximal posterior division M2 segment right MCA, moderate to severe atherosclerotic changes stable, s/p coil embolization.  Vascular surgery consulted and recommended outpatient cerebral angiogram. MRI brain with no acute infarct, showed progression of chronic infarcts. Of note patient with worsening anemia, hgb meeting criteria for blood transfusion. Patient refusing blood, unclear if medically competent, psychiatric consult placed. Transfusion risks discussed with patient's son, Roosevelt POTTER who was agreeable to blood transfusion.     Assessment / Plan:    Possible TIA versus recrudescence of previous CVA  - CTA head neck with new short segment lesion of right proximal vertebral artery which is patent

## 2024-08-26 NOTE — PROGRESS NOTES
HOLD PT/OT for today as patient's HGB is 5.7 today.We will monitor and work with patient when appropriate.

## 2024-08-26 NOTE — CONSULTS
strength grossly intact and symmetrical  Psych: alert and oriented to person, place and time  Skin: warm and moist.  Hematologic system: No bruising.  Vascular examination: Intact in lower extremity.  Labs:  Recent Results (from the past 24 hour(s))   POCT Glucose    Collection Time: 08/25/24  8:56 AM   Result Value Ref Range    POC Glucose 245 (H) 65 - 100 mg/dL    Performed by: CHAUNCEY VEGA    Urinalysis with Reflex to Culture    Collection Time: 08/25/24  9:50 AM    Specimen: Urine   Result Value Ref Range    Color, UA Yellow/Straw      Appearance Clear Clear      Specific Gravity, UA 1.014 1.003 - 1.030      pH, Urine 5.0 5.0 - 8.0      Protein, UA Negative Negative mg/dL    Glucose, Ur 50 (A) Negative mg/dL    Ketones, Urine Negative Negative mg/dL    Bilirubin, Urine Negative Negative      Blood, Urine Small (A) Negative      Urobilinogen, Urine 0.1 0.1 - 1.0 EU/dL    Nitrite, Urine Negative Negative      Leukocyte Esterase, Urine Negative Negative      WBC, UA 0-4 0 - 4 /hpf    RBC, UA 0-5 0 - 5 /hpf    Epithelial Cells, UA Few Few /lpf    BACTERIA, URINE Negative Negative /hpf    Urine Culture if Indicated Culture not indicated by UA result Culture not indicated by UA result      Mucus, UA Negative Negative /lpf    Calcium Oxalate 2+ (A) Negative   POCT Glucose    Collection Time: 08/25/24 11:34 AM   Result Value Ref Range    POC Glucose 239 (H) 65 - 100 mg/dL    Performed by: CHAUNCEY VEGA    Vascular duplex carotid bilateral    Collection Time: 08/25/24  2:29 PM   Result Value Ref Range    Left CCA dist .0 cm/s    Left CCA dist EDV 22.3 cm/s    Left CCA prox .0 cm/s    Left CCA prox EDV 14.5 cm/s    Left ICA dist .0 cm/s    Left ICA dist EDV 40.7 cm/s    Left ICA prox .0 cm/s    Left ICA prox EDV 23.2 cm/s    Left ECA .0 cm/s    Left ECA EDV 12.60 cm/s    Left subclavian prox .0 cm/s    Left subclavian prox EDV 17.4 cm/s    Left vertebral PSV 27.9  Total Bilirubin 0.2 0.2 - 1.0 mg/dL    AST 20 15 - 37 U/L    ALT 32 12 - 78 U/L    Alk Phosphatase 54 45 - 117 U/L    Total Protein 6.5 6.4 - 8.2 g/dL    Albumin 3.3 (L) 3.5 - 5.0 g/dL    Globulin 3.2 2.0 - 4.0 g/dL    Albumin/Globulin Ratio 1.0 (L) 1.1 - 2.2     Procalcitonin    Collection Time: 08/26/24  5:31 AM   Result Value Ref Range    Procalcitonin 0.05 (H) 0 ng/mL   TYPE AND SCREEN    Collection Time: 08/26/24  5:31 AM   Result Value Ref Range    Crossmatch expiration date 08/29/2024,2359     ABO/Rh O Positive     Antibody Screen Negative          Images:  X-ray reviewed.  CT scan reviewed.          Assessments:  Patient Active Problem List   Diagnosis    Malignant hypertension    Aneurysm of intracranial portion of internal carotid artery    Cerebrovascular accident (CVA) due to stenosis of right middle cerebral artery (HCC)    Hypertension    Hyperlipidemia    Tobacco use    CKD (chronic kidney disease), stage III (HCC)    Dizziness       Plans: I did review a CT scan at with neck as well as a carotid duplex ultrasound.  Patient has widely patent right proximal ICA, left side is about 50% stenosis left proximal ICA.  CT angiogram of the neck and head shows multiple intracerebral stenosis noted.    Patient will need elective cerebral angiogram possible intervention.  This can be arranged as outpatient.    Continue to work on risk factor modification for cardiovascular atherosclerotic disease process.  No carotid intervention indicated.  Will continue to follow.          Nahid Mccracken MD

## 2024-08-27 LAB
ABO + RH BLD: NORMAL
ALBUMIN SERPL-MCNC: 3.1 G/DL (ref 3.5–5)
ALBUMIN/GLOB SERPL: 1 (ref 1.1–2.2)
ALP SERPL-CCNC: 53 U/L (ref 45–117)
ALT SERPL-CCNC: 31 U/L (ref 12–78)
ANION GAP SERPL CALC-SCNC: 4 MMOL/L (ref 5–15)
AST SERPL W P-5'-P-CCNC: 21 U/L (ref 15–37)
BASOPHILS # BLD: 0.1 K/UL (ref 0–0.1)
BASOPHILS NFR BLD: 0 % (ref 0–1)
BILIRUB SERPL-MCNC: 0.4 MG/DL (ref 0.2–1)
BLD PROD TYP BPU: NORMAL
BLD PROD TYP BPU: NORMAL
BLOOD BANK BLOOD PRODUCT EXPIRATION DATE: NORMAL
BLOOD BANK BLOOD PRODUCT EXPIRATION DATE: NORMAL
BLOOD BANK DISPENSE STATUS: NORMAL
BLOOD BANK DISPENSE STATUS: NORMAL
BLOOD BANK ISBT PRODUCT BLOOD TYPE: 5100
BLOOD BANK ISBT PRODUCT BLOOD TYPE: 5100
BLOOD BANK PRODUCT CODE: NORMAL
BLOOD BANK UNIT TYPE AND RH: NORMAL
BLOOD BANK UNIT TYPE AND RH: NORMAL
BLOOD GROUP ANTIBODIES SERPL: NEGATIVE
BPU ID: NORMAL
BPU ID: NORMAL
BUN SERPL-MCNC: 18 MG/DL (ref 6–20)
BUN/CREAT SERPL: 15 (ref 12–20)
CA-I BLD-MCNC: 8.7 MG/DL (ref 8.5–10.1)
CHLORIDE SERPL-SCNC: 111 MMOL/L (ref 97–108)
CO2 SERPL-SCNC: 26 MMOL/L (ref 21–32)
CREAT SERPL-MCNC: 1.23 MG/DL (ref 0.55–1.02)
CROSSMATCH RESULT: NORMAL
CROSSMATCH RESULT: NORMAL
DIFFERENTIAL METHOD BLD: ABNORMAL
EOSINOPHIL # BLD: 0.3 K/UL (ref 0–0.4)
EOSINOPHIL NFR BLD: 3 % (ref 0–7)
ERYTHROCYTE [DISTWIDTH] IN BLOOD BY AUTOMATED COUNT: 15.7 % (ref 11.5–14.5)
GLOBULIN SER CALC-MCNC: 3.1 G/DL (ref 2–4)
GLUCOSE SERPL-MCNC: 142 MG/DL (ref 65–100)
HCT VFR BLD AUTO: 25.7 % (ref 35–47)
HGB BLD-MCNC: 8.6 G/DL (ref 11.5–16)
IMM GRANULOCYTES # BLD AUTO: 0 K/UL (ref 0–0.04)
IMM GRANULOCYTES NFR BLD AUTO: 0 % (ref 0–0.5)
LYMPHOCYTES # BLD: 3.3 K/UL (ref 0.8–3.5)
LYMPHOCYTES NFR BLD: 29 % (ref 12–49)
MAGNESIUM SERPL-MCNC: 1.9 MG/DL (ref 1.6–2.4)
MCH RBC QN AUTO: 29.3 PG (ref 26–34)
MCHC RBC AUTO-ENTMCNC: 33.5 G/DL (ref 30–36.5)
MCV RBC AUTO: 87.4 FL (ref 80–99)
MONOCYTES # BLD: 0.9 K/UL (ref 0–1)
MONOCYTES NFR BLD: 8 % (ref 5–13)
NEUTS SEG # BLD: 6.6 K/UL (ref 1.8–8)
NEUTS SEG NFR BLD: 60 % (ref 32–75)
NRBC # BLD: 0 K/UL (ref 0–0.01)
NRBC BLD-RTO: 0 PER 100 WBC
PLATELET # BLD AUTO: 254 K/UL (ref 150–400)
PMV BLD AUTO: 11.8 FL (ref 8.9–12.9)
POTASSIUM SERPL-SCNC: 3.5 MMOL/L (ref 3.5–5.1)
PROT SERPL-MCNC: 6.2 G/DL (ref 6.4–8.2)
RBC # BLD AUTO: 2.94 M/UL (ref 3.8–5.2)
SODIUM SERPL-SCNC: 141 MMOL/L (ref 136–145)
SPECIMEN EXP DATE BLD: NORMAL
TRANSFUSION STATUS PATIENT QL: NORMAL
TRANSFUSION STATUS PATIENT QL: NORMAL
UNIT DIVISION: 0
UNIT DIVISION: 0
UNIT ISSUE DATE/TIME: NORMAL
UNIT ISSUE DATE/TIME: NORMAL
WBC # BLD AUTO: 11.1 K/UL (ref 3.6–11)

## 2024-08-27 PROCEDURE — 6370000000 HC RX 637 (ALT 250 FOR IP): Performed by: STUDENT IN AN ORGANIZED HEALTH CARE EDUCATION/TRAINING PROGRAM

## 2024-08-27 PROCEDURE — 83735 ASSAY OF MAGNESIUM: CPT

## 2024-08-27 PROCEDURE — 97530 THERAPEUTIC ACTIVITIES: CPT

## 2024-08-27 PROCEDURE — 1100000000 HC RM PRIVATE

## 2024-08-27 PROCEDURE — 80053 COMPREHEN METABOLIC PANEL: CPT

## 2024-08-27 PROCEDURE — 6370000000 HC RX 637 (ALT 250 FOR IP): Performed by: INTERNAL MEDICINE

## 2024-08-27 PROCEDURE — 85025 COMPLETE CBC W/AUTO DIFF WBC: CPT

## 2024-08-27 PROCEDURE — 6370000000 HC RX 637 (ALT 250 FOR IP): Performed by: PHYSICIAN ASSISTANT

## 2024-08-27 PROCEDURE — 36415 COLL VENOUS BLD VENIPUNCTURE: CPT

## 2024-08-27 PROCEDURE — 82272 OCCULT BLD FECES 1-3 TESTS: CPT

## 2024-08-27 PROCEDURE — 2580000003 HC RX 258: Performed by: INTERNAL MEDICINE

## 2024-08-27 RX ADMIN — ATORVASTATIN CALCIUM 80 MG: 40 TABLET, FILM COATED ORAL at 22:48

## 2024-08-27 RX ADMIN — SODIUM CHLORIDE, PRESERVATIVE FREE 10 ML: 5 INJECTION INTRAVENOUS at 22:52

## 2024-08-27 RX ADMIN — ACETAMINOPHEN 650 MG: 325 TABLET ORAL at 22:47

## 2024-08-27 RX ADMIN — SODIUM CHLORIDE, PRESERVATIVE FREE 10 ML: 5 INJECTION INTRAVENOUS at 10:18

## 2024-08-27 RX ADMIN — PANTOPRAZOLE SODIUM 40 MG: 40 TABLET, DELAYED RELEASE ORAL at 06:37

## 2024-08-27 ASSESSMENT — PAIN - FUNCTIONAL ASSESSMENT: PAIN_FUNCTIONAL_ASSESSMENT: ACTIVITIES ARE NOT PREVENTED

## 2024-08-27 ASSESSMENT — PAIN SCALES - GENERAL
PAINLEVEL_OUTOF10: 2
PAINLEVEL_OUTOF10: 3
PAINLEVEL_OUTOF10: 2

## 2024-08-27 ASSESSMENT — PAIN DESCRIPTION - LOCATION: LOCATION: HIP

## 2024-08-27 ASSESSMENT — PAIN DESCRIPTION - ORIENTATION: ORIENTATION: LEFT

## 2024-08-27 ASSESSMENT — PAIN DESCRIPTION - DESCRIPTORS: DESCRIPTORS: ACHING

## 2024-08-27 NOTE — PLAN OF CARE
PHYSICAL THERAPY TREATMENT     Patient: Sally Linn (64 y.o. female)  Date: 8/27/2024  Diagnosis: Dizziness [R42]  Slurred speech [R47.81]  Stroke-like symptom [R29.90] Dizziness      Precautions: Fall Risk                      Recommendations for nursing mobility: Out of bed to chair for meals and Assist x1    In place during session: None  Chart, physical therapy assessment, plan of care and goals were reviewed.  ASSESSMENT  Patient continues with skilled PT services and is progressing towards goals. Pt supine in bed upon PT arrival, agreeable to session. (See below for objective details and assist levels).     Overall pt tolerated session fair today. Pt completed transfers with sup. Pt ambulated ~60 ft with no AD and sup. Gait was steady with no lob or knee buckling. Pt had trouble following commands and required repetition. Pt left sitting up in bedside chair with all needs met. Will continue to benefit from skilled PT services, and will continue to progress as tolerated. Current PT DC recommendation Moderate intensity short-term skilled physical therapy up to 5x/week once medically appropriate.    GOALS:  Problem: Physical Therapy - Adult  Goal: By Discharge: Performs mobility at highest level of function for planned discharge setting.  See evaluation for individualized goals.  Description: FUNCTIONAL STATUS PRIOR TO ADMISSION: Patient was modified independent using a single point cane for functional mobility.    HOME SUPPORT PRIOR TO ADMISSION: The patient lived with son and was modified independent for ADLs.    Physical Therapy Goals  Initiated 8/24/2024  Pt stated goal: Get better  Pt will be I with LE HEP in 7 days.  Pt will perform bed mobility with Supervision in 7 days.  Pt will perform transfers with Supervision in 7 days.   Pt will amb 40 feet with LRAD safely with Modified [place] in 7 days.  Pt will verbalize and demonstrate compliance with fall precautions  in 7 days.  awareness training;Verbal cues;Tactile cues  Sit to Stand: Supervision  Stand to Sit: Supervision  Bed to Chair: Supervision  Balance:  Balance  Sitting: Intact  Standing: Intact  Standing - Static: Good  Standing - Dynamic: Good  Wheelchair Mobility:  Wheelchair Management  Wheelchair Management: No  Ambulation/Gait Training:  Gait  Gait Training: Yes  Overall Level of Assistance: Supervision  Distance (ft): 60 Feet  Assistive Device: None    Therapeutic Exercises:     EXERCISE   Sets   Reps   Active Active Assist   Passive Self ROM   Comments   Ankle Pumps  10 [x] [] [] []    Quad Sets/Glut Sets   [] [] [] []    Hamstring Sets   [] [] [] []    Short Arc Quads   [] [] [] []    Heel Slides   [] [] [] []    Straight Leg Raises   [] [] [] []    Hip abd/add   [] [] [] []    Long Arc Quads  8 [x] [] [] []    Marching  5 [x] [] [] []    Seated HR/TR   [] [] [] []       [] [] [] []       Pain Ratin/10 reported    Activity Tolerance:   Fair     After treatment patient left in no apparent distress:   Bed locked and returned to lowest position, Patient left in no apparent distress sitting up in chair and Call bell within reach, and nsg updated     COMMUNICATION/COLLABORATION:   The patient’s plan of care was discussed with: Registered nurse    Patient Education  Education Given To: Patient  Education Provided: Role of Therapy;Plan of Care;Energy Conservation;Transfer Training;Fall Prevention Strategies;Home Exercise Program  Education Method: Demonstration;Verbal  Education Outcome: Verbalized understanding;Continued education needed    Elda Yoon PTA  Minutes: 14

## 2024-08-27 NOTE — PROGRESS NOTES
1.1-1.2     Hypokalemia  - Improved  - Mag pending      Essential hypertension   - hold losartan and amlodipine due to normotension     HLP   - Continue statin  - lipid profile as above.     Leukocytosis  - Unclear etiology, possibly reactive  - WBC 13.5 > 11.1  - Chest x-ray without evidence of pneumonia  - UA not consistent with UTI  - Patient has been afebrile since admission  -Pro-Dion 0.05  - No noted infectious complaints  - Continue monitor  - No indication for antibiotics at this time    Medical Decision Making     [x] High (any 2)     A. Problems (any 1)  [x] Acute/Chronic Illness/injury posing threat to life or bodily function:    [] Severe exacerbation of chronic illness:    ---------------------------------------------------------------------  B. Risk of Treatment (any 1)   [x] Drugs/treatments that require intensive monitoring for toxicity include:    [] IV ABX requiring serial renal monitoring for nephrotoxicity:     [] IV Narcotic analgesia for adverse drug reaction  [] Aggressive IV diuresis requiring serial monitoring for renal impairment and electrolyte derangements  [] Critical electrolyte abnormalities requiring IV replacement and close serial monitoring  [] Insulin - monitoring serial FSBS for Hypoglycemic adverse drug reaction  [x] Other - blood transfusion  [] Change in code status:    [] Decision to escalate care:    [] Major surgery/procedure with associated risk factors:     ----------------------------------------------------------------------  C. Data (any 2)  [] Discussed current management and discharge planning options with Case Management.  [x] Discussed management of the case with:  pt, RN  [] Telemetry personally reviewed and interpreted as documented above    [] Imaging personally reviewed and interpreted, includes:     [x] Data Review (any 3)  [x] All available Consultant notes from yesterday/today were reviewed  [x] All current labs were reviewed and interpreted for clinical    ________________________________________________________________________  Mariella Bennett PA-C     Procedures: see electronic medical records for all procedures/Xrays and details which were not copied into this note but were reviewed prior to creation of Plan.      LABS:  I reviewed today's most current labs and imaging studies.  Pertinent labs include:  Recent Labs     08/25/24  0612 08/26/24  0531 08/27/24  0525   WBC 14.7* 13.5* 11.1*   HGB 6.0* 5.7* 8.6*   HCT 18.5* 17.4* 25.7*    279 254     Recent Labs     08/25/24  0613 08/26/24  0531 08/27/24  0525    142 141   K 3.3* 3.6 3.5   * 112* 111*   CO2 25 24 26   BUN 50* 29* 18   MG 1.9  --   --    ALT  --  32 31       Signed: Mariella Bennett PA-C

## 2024-08-27 NOTE — PROGRESS NOTES
OCCUPATIONAL THERAPY TREATMENT  Patient: Sally Linn (64 y.o. female)  Date: 8/27/2024  Primary Diagnosis: Dizziness [R42]  Slurred speech [R47.81]  Stroke-like symptom [R29.90]       Precautions: Fall Risk                Recommendations for nursing mobility: Out of bed to chair for meals, Encourage HEP in prep for ADLs/mobility; see handout for details, AD and gt belt for bed to chair , Amb to bathroom with AD and gait belt, and Assist x1    In place during session: EKG/telemetry   Chart, occupational therapy assessment, plan of care, and goals were reviewed.  ASSESSMENT  Patient continues with skilled OT services and is progressing towards goals. Pt semi supine in bed upon CORTEZ arrival, agreeable to session. Pt A&O x 4 w/1:1 sitter. Pt required vc's to slow down to increase safety awareness during functional mobility in room.  Pt did not use AE during walking to/ from sink w/ no lob noted. Pt tolerated standing at sink for ~ 7-10 minutes while completing light grooming tasks w/o LOB. Pt returned to semi supine in bed and completed UE therex, see grid below for details. Therex to maintain/ increase strength and endurance to aid in ADL performance. Pt also completed finger tip pinch exercises on L hand to increase functional mobility.  Education provided on energy conservation, safety awareness and HEP w/ pt verbalizing good understanding. Pt encouraged to complete HEP 2-3 times per day to aid in recovery . (See below for objective details and assist levels).     Overall pt tolerated session well today with rest breaks and vc's for safety awareness . Current OT recommendations for discharge Moderate intensity short-term skilled occupational therapy up to 5x/week. Will continue to benefit from skilled OT services, and will continue to progress as tolerated.       GOALS:    Problem: Occupational Therapy - Adult  Goal: By Discharge: Performs self-care activities at highest level of function for

## 2024-08-27 NOTE — CARE COORDINATION
DC Plan: SNF    Pt has [employer] insurance. CM called [facility] [phone] and spoke with ITZEL Barajas CM. [facility] is contracted with the following SNF's:    - [facility]  [facility]  - [facility]  - [facility]   - [facility]    [facility] contact# [phone]    [facility] will provide transport to SNF.     Cm stopped by pt's room. OT came out of the room. Family was not present, however pt has a sitter at the bedside.     Cm called pt's son - Roosevelt Linn [phone] to discuss pt's dc plan. Cm informed pt's son the SNF's that are contracted with [facility]. Son will review the facilities. CM to f/up with pt's son this afternoon.     1534    Cm attempted to contact pt's son - Roosevelt. Cm left a voice message.     1547    CM called pt's daughter - Sophia Lang [phone]. Family would like to know if [facility] can authorize [facility] or something similar to that level of care. Cm informed daughter writer will call RN CM at [facility] to find out.     Cm attempted to contact ITZEL Barajas CM for [employer]. Cm left a voice message.

## 2024-08-28 LAB
ALBUMIN SERPL-MCNC: 3.3 G/DL (ref 3.5–5)
ALBUMIN/GLOB SERPL: 1 (ref 1.1–2.2)
ALP SERPL-CCNC: 63 U/L (ref 45–117)
ALT SERPL-CCNC: 35 U/L (ref 12–78)
ANION GAP SERPL CALC-SCNC: 3 MMOL/L (ref 5–15)
AST SERPL W P-5'-P-CCNC: 23 U/L (ref 15–37)
BASOPHILS # BLD: 0.1 K/UL (ref 0–0.1)
BASOPHILS NFR BLD: 0 % (ref 0–1)
BILIRUB SERPL-MCNC: 0.4 MG/DL (ref 0.2–1)
BUN SERPL-MCNC: 14 MG/DL (ref 6–20)
BUN/CREAT SERPL: 12 (ref 12–20)
CA-I BLD-MCNC: 9 MG/DL (ref 8.5–10.1)
CHLORIDE SERPL-SCNC: 111 MMOL/L (ref 97–108)
CO2 SERPL-SCNC: 26 MMOL/L (ref 21–32)
COLLECT DATE STL: ABNORMAL
CREAT SERPL-MCNC: 1.13 MG/DL (ref 0.55–1.02)
DIFFERENTIAL METHOD BLD: ABNORMAL
EOSINOPHIL # BLD: 0.3 K/UL (ref 0–0.4)
EOSINOPHIL NFR BLD: 3 % (ref 0–7)
ERYTHROCYTE [DISTWIDTH] IN BLOOD BY AUTOMATED COUNT: 16.7 % (ref 11.5–14.5)
FERRITIN SERPL-MCNC: 57 NG/ML (ref 8–252)
GLOBULIN SER CALC-MCNC: 3.4 G/DL (ref 2–4)
GLUCOSE SERPL-MCNC: 133 MG/DL (ref 65–100)
HCT VFR BLD AUTO: 27.7 % (ref 35–47)
HEMOCCULT SP1 STL QL: POSITIVE
HGB BLD-MCNC: 9.1 G/DL (ref 11.5–16)
IMM GRANULOCYTES # BLD AUTO: 0.1 K/UL (ref 0–0.04)
IMM GRANULOCYTES NFR BLD AUTO: 0 % (ref 0–0.5)
IRON SATN MFR SERPL: 12 % (ref 20–50)
IRON SERPL-MCNC: 26 UG/DL (ref 35–150)
LYMPHOCYTES # BLD: 3.2 K/UL (ref 0.8–3.5)
LYMPHOCYTES NFR BLD: 27 % (ref 12–49)
MCH RBC QN AUTO: 29.4 PG (ref 26–34)
MCHC RBC AUTO-ENTMCNC: 32.9 G/DL (ref 30–36.5)
MCV RBC AUTO: 89.6 FL (ref 80–99)
MONOCYTES # BLD: 1 K/UL (ref 0–1)
MONOCYTES NFR BLD: 9 % (ref 5–13)
NEUTS SEG # BLD: 7.2 K/UL (ref 1.8–8)
NEUTS SEG NFR BLD: 61 % (ref 32–75)
NRBC # BLD: 0 K/UL (ref 0–0.01)
NRBC BLD-RTO: 0 PER 100 WBC
PLATELET # BLD AUTO: 288 K/UL (ref 150–400)
PMV BLD AUTO: 11.9 FL (ref 8.9–12.9)
POTASSIUM SERPL-SCNC: 3.6 MMOL/L (ref 3.5–5.1)
PROT SERPL-MCNC: 6.7 G/DL (ref 6.4–8.2)
RBC # BLD AUTO: 3.09 M/UL (ref 3.8–5.2)
SODIUM SERPL-SCNC: 140 MMOL/L (ref 136–145)
TIBC SERPL-MCNC: 224 UG/DL (ref 250–450)
WBC # BLD AUTO: 11.8 K/UL (ref 3.6–11)

## 2024-08-28 PROCEDURE — 97530 THERAPEUTIC ACTIVITIES: CPT

## 2024-08-28 PROCEDURE — 99232 SBSQ HOSP IP/OBS MODERATE 35: CPT | Performed by: SURGERY

## 2024-08-28 PROCEDURE — 80053 COMPREHEN METABOLIC PANEL: CPT

## 2024-08-28 PROCEDURE — 6370000000 HC RX 637 (ALT 250 FOR IP): Performed by: PHYSICIAN ASSISTANT

## 2024-08-28 PROCEDURE — 6370000000 HC RX 637 (ALT 250 FOR IP): Performed by: INTERNAL MEDICINE

## 2024-08-28 PROCEDURE — 6370000000 HC RX 637 (ALT 250 FOR IP): Performed by: STUDENT IN AN ORGANIZED HEALTH CARE EDUCATION/TRAINING PROGRAM

## 2024-08-28 PROCEDURE — 85025 COMPLETE CBC W/AUTO DIFF WBC: CPT

## 2024-08-28 PROCEDURE — 82728 ASSAY OF FERRITIN: CPT

## 2024-08-28 PROCEDURE — 36415 COLL VENOUS BLD VENIPUNCTURE: CPT

## 2024-08-28 PROCEDURE — 2580000003 HC RX 258: Performed by: INTERNAL MEDICINE

## 2024-08-28 PROCEDURE — 83540 ASSAY OF IRON: CPT

## 2024-08-28 PROCEDURE — 1100000000 HC RM PRIVATE

## 2024-08-28 RX ADMIN — PANTOPRAZOLE SODIUM 40 MG: 40 TABLET, DELAYED RELEASE ORAL at 06:30

## 2024-08-28 RX ADMIN — ACETAMINOPHEN 650 MG: 325 TABLET ORAL at 20:53

## 2024-08-28 RX ADMIN — ATORVASTATIN CALCIUM 80 MG: 40 TABLET, FILM COATED ORAL at 20:43

## 2024-08-28 RX ADMIN — SODIUM CHLORIDE, PRESERVATIVE FREE 10 ML: 5 INJECTION INTRAVENOUS at 09:50

## 2024-08-28 RX ADMIN — SODIUM CHLORIDE, PRESERVATIVE FREE 10 ML: 5 INJECTION INTRAVENOUS at 20:44

## 2024-08-28 ASSESSMENT — PAIN SCALES - GENERAL
PAINLEVEL_OUTOF10: 2
PAINLEVEL_OUTOF10: 0
PAINLEVEL_OUTOF10: 4
PAINLEVEL_OUTOF10: 3

## 2024-08-28 ASSESSMENT — PAIN DESCRIPTION - LOCATION: LOCATION: KNEE

## 2024-08-28 ASSESSMENT — PAIN DESCRIPTION - ORIENTATION: ORIENTATION: RIGHT;LEFT

## 2024-08-28 NOTE — PROGRESS NOTES
Telemetry personally reviewed and interpreted as documented above    [] Imaging personally reviewed and interpreted, includes:     [x] Data Review (any 3)  [x] All available Consultant notes from yesterday/today were reviewed  [x] All current labs were reviewed and interpreted for clinical significance   [x] Appropriate follow-up labs were ordered  [] Collateral history obtained from:           Code Status: FULL  DVT Prophylaxis: SCD  GI Prophylaxis:Protonix    Subjective:     Chief Complaint / Reason for Physician Visit  Patient evaluated at the bedside.  Patient cooperative on exam.  Remains alert and oriented x 3.  Patient has no complaints of pain.  Continues to have poor attention during interview, stops talking to adjust in bed and then briefly becomes tearful.      Objective:     VITALS:   Last 24hrs VS reviewed since prior progress note. Most recent are:  Patient Vitals for the past 24 hrs:   BP Temp Temp src Pulse Resp SpO2 Weight   08/28/24 0757 136/72 98.4 °F (36.9 °C) Oral 72 18 100 % --   08/27/24 2247 (!) 142/59 98.4 °F (36.9 °C) Oral 78 18 100 % --   08/27/24 2045 (!) 144/72 98.4 °F (36.9 °C) Oral 86 18 97 % --   08/27/24 2030 -- -- -- -- -- -- 71.4 kg (157 lb 4.8 oz)   08/27/24 1458 130/63 98.2 °F (36.8 °C) Oral 79 18 100 % --         Intake/Output Summary (Last 24 hours) at 8/28/2024 1225  Last data filed at 8/27/2024 1337  Gross per 24 hour   Intake --   Output 300 ml   Net -300 ml        I had a face to face encounter and independently examined this patient on 8/28/2024, as outlined below:    Review of Systems   Unable to perform ROS: Mental status change        PHYSICAL EXAM:  Physical Exam  Vitals reviewed.   Constitutional:       General: She is not in acute distress.     Appearance: Normal appearance. She is obese.   HENT:      Head: Normocephalic and atraumatic.   Eyes:      Extraocular Movements: Extraocular movements intact.      Conjunctiva/sclera: Conjunctivae normal.      Pupils: Pupils  are equal, round, and reactive to light.   Cardiovascular:      Rate and Rhythm: Normal rate and regular rhythm.      Heart sounds: Murmur heard.   Pulmonary:      Effort: Pulmonary effort is normal. No respiratory distress.      Breath sounds: Normal breath sounds. No wheezing or rales.   Abdominal:      General: There is no distension.      Palpations: Abdomen is soft.      Tenderness: There is no abdominal tenderness.   Musculoskeletal:         General: No swelling or tenderness. Normal range of motion.      Right lower leg: No edema.      Left lower leg: No edema.   Skin:     General: Skin is warm and dry.   Neurological:      General: No focal deficit present.      Mental Status: She is alert. She is disoriented.      Comments: Patient oriented to person, place, year.  Disoriented to situation, events leading to hospitalization.   Psychiatric:      Comments: Cooperative at this time. Smiling. Becomes distracted during interview.  Asking to be lifted up in the bed and making other requests.          Reviewed most current lab test results and cultures  YES  Reviewed most current radiology test results   YES  Review and summation of old records today    NO  Reviewed patient's current orders and MAR    YES  PMH/SH reviewed - no change compared to H&P  ________________________________________________________________________  Care Plan discussed with:    Comments   Patient x    Family      RN x    Care Manager     Consultant                        Multidiciplinary team rounds were held today with , nursing, pharmacist and clinical coordinator.  Patient's plan of care was discussed; medications were reviewed and discharge planning was addressed.     ________________________________________________________________________  Total NON critical care TIME:  35  Minutes    Total CRITICAL CARE TIME Spent:   Minutes non procedure based      Comments   >50% of visit spent in counseling and coordination of care

## 2024-08-28 NOTE — PLAN OF CARE
PHYSICAL THERAPY TREATMENT     Patient: Sally Linn (64 y.o. female)  Date: 8/28/2024  Diagnosis: Dizziness [R42]  Slurred speech [R47.81]  Stroke-like symptom [R29.90] Dizziness      Precautions: Fall Risk                      Recommendations for nursing mobility: Assist x1    In place during session: None  Chart, physical therapy assessment, plan of care and goals were reviewed.  ASSESSMENT  Patient continues with skilled PT services and is progressing towards goals. Pt supine in bed upon PT arrival, agreeable to session. (See below for objective details and assist levels).     Overall pt tolerated session well today. Pt performed seated LE therex with no complaints of pain or discomfort. Pt ambulated ~15 ft to sit in recliner chair and sat with all needs met. Pt given word search puzzles as per requested during yesterday's tx session. Pt appreciative. Pt left with sitter in room and all needs met. Will continue to benefit from skilled PT services, and will continue to progress as tolerated. Current PT DC recommendation Moderate intensity short-term skilled physical therapy up to 5x/week once medically appropriate.    GOALS:  Problem: Physical Therapy - Adult  Goal: By Discharge: Performs mobility at highest level of function for planned discharge setting.  See evaluation for individualized goals.  Description: FUNCTIONAL STATUS PRIOR TO ADMISSION: Patient was modified independent using a single point cane for functional mobility.    HOME SUPPORT PRIOR TO ADMISSION: The patient lived with son and was modified independent for ADLs.    Physical Therapy Goals  Initiated 8/24/2024  Pt stated goal: Get better  Pt will be I with LE HEP in 7 days.  Pt will perform bed mobility with Supervision in 7 days.  Pt will perform transfers with Supervision in 7 days.   Pt will amb 40 feet with LRAD safely with Modified [place] in 7 days.  Pt will verbalize and demonstrate compliance with fall  Independent  Transfers:  Transfer Training  Transfer Training: Yes  Interventions: Safety awareness training;Verbal cues;Tactile cues  Sit to Stand: Independent  Stand to Sit: Independent  Bed to Chair: Independent  Toilet Transfer: Stand-by assistance (vc's for safety and proper hand placement.)  Balance:  Balance  Sitting: Intact  Standing: Intact  Standing - Static: Good  Standing - Dynamic: Good  Wheelchair Mobility:  Wheelchair Management  Wheelchair Management: No  Ambulation/Gait Training:  Gait  Gait Training: Yes  Overall Level of Assistance: Independent  Distance (ft): 15 Feet  Assistive Device: None    Therapeutic Exercises:     EXERCISE   Sets   Reps   Active Active Assist   Passive Self ROM   Comments   Ankle Pumps  10 [x] [] [] []    Quad Sets/Glut Sets   [] [] [] []    Hamstring Sets   [] [] [] []    Short Arc Quads   [] [] [] []    Heel Slides   [] [] [] []    Straight Leg Raises   [] [] [] []    Hip abd/add   [] [] [] []    Long Arc Quads  10 [x] [] [] []    Marching  10 [x] [] [] []    Seated HR/TR   [] [] [] []       [] [] [] []       Pain Ratin/10 reported    Activity Tolerance:   Good    After treatment patient left in no apparent distress:   Bed locked and returned to lowest position, Patient left in no apparent distress sitting up in chair and Caregiver / family present, and nsg updated     COMMUNICATION/COLLABORATION:   The patient’s plan of care was discussed with: Registered nurse    Patient Education  Education Given To: Patient  Education Provided: Role of Therapy;Plan of Care;Energy Conservation;Transfer Training;Home Exercise Program  Education Method: Demonstration;Verbal  Education Outcome: Verbalized understanding;Continued education needed    Elda Yoon PTA  Minutes: 11

## 2024-08-28 NOTE — CARE COORDINATION
Cm attempted to contact ITZEL Barajas CM for [facility] [phone]. CM left a voice message.     1312    Cm spoke with ITZEL Barajas CM for [facility]. Cm discussed [facility]. If family wants a referral sent to [facility], CM can send it, however it will be up to [facility] if they will approve it or not.     Cm spoke with daughter - Sophia Lang [phone] and provided her with an update. Daughter would like a referral sent to [facility]hab. Cm discussed back up facility choices in case [place] does not approve [facility]. Daughter indicated she will need to get back with writer. Awaiting phone call.     Referral made via [facility] to [facility].     _____________________________________________    1520    Cm received a phone call from pt's daughter. CM received back up choices for [facility] and [facility].     3472    Cm called daughter back and informed her [facility] does not have a contract with [facility]. Cm informed daughter the facilities [facility] has a contract with. Daughter chose [facility] [address].

## 2024-08-28 NOTE — PLAN OF CARE
Problem: Discharge Planning  Goal: Discharge to home or other facility with appropriate resources  Outcome: Progressing     Problem: Skin/Tissue Integrity  Goal: Absence of new skin breakdown  Description: 1.  Monitor for areas of redness and/or skin breakdown  2.  Assess vascular access sites hourly  3.  Every 4-6 hours minimum:  Change oxygen saturation probe site  4.  Every 4-6 hours:  If on nasal continuous positive airway pressure, respiratory therapy assess nares and determine need for appliance change or resting period.  Outcome: Progressing     Problem: Safety - Adult  Goal: Free from fall injury  Outcome: Progressing  Flowsheets (Taken 8/27/2024 2030)  Free From Fall Injury: Instruct family/caregiver on patient safety     Problem: Pain  Goal: Verbalizes/displays adequate comfort level or baseline comfort level  Outcome: Progressing     Problem: Chronic Conditions and Co-morbidities  Goal: Patient's chronic conditions and co-morbidity symptoms are monitored and maintained or improved  Outcome: Progressing

## 2024-08-28 NOTE — PROGRESS NOTES
OCCUPATIONAL THERAPY TREATMENT  Patient: Sally Linn (64 y.o. female)  Date: 8/28/2024  Primary Diagnosis: Dizziness [R42]  Slurred speech [R47.81]  Stroke-like symptom [R29.90]       Precautions: Fall Risk                Recommendations for nursing mobility: Out of bed to chair for meals, Encourage HEP in prep for ADLs/mobility; see handout for details, Use of BSC for toileting , AD and gt belt for bed to chair , and Assist x1    In place during session: None  Chart, occupational therapy assessment, plan of care, and goals were reviewed.  ASSESSMENT  Patient continues with skilled OT services and is progressing towards goals. Pt walking to toilet w/ PCT upon CORTEZ arrival, agreeable to session. Pt A&O x 4. Pt had incontinent episode walking to toilet. Pt reported that this has been happening since her CVA this spring. Spoke w/ pt about using BSC for now d/t bladder urgency to increase safety and decrease risk of falls.  Pt is agreeable to plan.  Spoke w/ RN after session about notifying DR on urgency issue and using bsc to increase safety.  Pt completed LB bathing seated eob w/ set up. Pt returned to semi supine and completed L UE therex to maintain/ increase strength and coordination.  Pt required mod vc's to slow down. Pt required set up of meal tray. All known needs met. (See below for objective details and assist levels).     Overall pt tolerated session well today with rest breaks and frequent vc's for safety awareness.  Current OT recommendations for discharge Moderate intensity short-term skilled occupational therapy up to 5x/week. Will continue to benefit from skilled OT services, and will continue to progress as tolerated.       GOALS:    Problem: Occupational Therapy - Adult  Goal: By Discharge: Performs self-care activities at highest level of function for planned discharge setting.  See evaluation for individualized goals.  Description: FUNCTIONAL STATUS PRIOR TO ADMISSION:  Pt and son  SUMMARY:   Cognitive/Behavioral Status:  Orientation  Overall Orientation Status: Within Functional Limits  Orientation Level: Oriented X4  Cognition  Overall Cognitive Status: Exceptions  Arousal/Alertness: Appears intact  Following Commands: Follows one step commands with increased time;Follows multistep commands with increased time;Follows multistep commands with repitition;Follows one step commands with repetition  Attention Span: Difficulty dividing attention;Attends with cues to redirect  Memory: Impaired  Safety Judgement: Decreased awareness of need for safety;Decreased awareness of need for assistance  Problem Solving: Assistance required to implement solutions;Assistance required to identify errors made;Decreased awareness of errors;Assistance required to generate solutions;Assistance required to correct errors made  Insights: Decreased awareness of deficits  Initiation: Requires cues for some  Sequencing: Requires cues for some  Cognition Comment: Pt impulsive and required mod vc's to slow down and to think about safety.    Functional Mobility and Transfers for ADLs:  Bed Mobility:  Bed Mobility Training  Bed Mobility Training: Yes  Rolling: Modified independent  Sit to Supine: Stand-by assistance  Scooting: Modified independent    Transfers:  Transfer Training  Transfer Training: Yes  Interventions: Safety awareness training;Verbal cues;Tactile cues  Sit to Stand: Supervision  Stand to Sit: Supervision  Toilet Transfer: Stand-by assistance (vc's for safety and proper hand placement.)      Balance:  Balance  Sitting: Intact  Standing: Intact  Standing - Static: Good  Standing - Dynamic: Good      ADL Intervention:    Feeding: Setup   Feeding Skilled Clinical Factors: d/t decrease motor control of L hand     Grooming: Stand by assistance   Grooming Skilled Clinical Factors: to complete hand hygeine standing at sink.      Toileting: Contact guard assistance  Toileting Skilled Clinical Factors: for standing

## 2024-08-28 NOTE — PROGRESS NOTES
PROGRESS NOTE      Chief Complaints:  No new issues overnight  HPI and  Objective:    No new focal neurodeficits.  Speech normal.  Review of Systems:  Rest of review of system reviewed personally and they are negative.    EXAM:  BP (!) 142/59   Pulse 78   Temp 98.4 °F (36.9 °C) (Oral)   Resp 18   Ht 1.702 m (5' 7\")   Wt 71.4 kg (157 lb 4.8 oz)   SpO2 100%   BMI 24.64 kg/m²     Patient is awake   Head and neck atraumatic, normocephalic.  ENT: No hoarse voice, gaze appropriate.  Cardiac system regular rate rhythm.  Pulmonary no audible wheeze, no cyanosis.  Chest wall excursion normal with respiration cycle  Abdomen is soft not particularly distended.  Neurologically nonfocal.  Hematology system: No bruising noted.  Musculoskeletal system: No joint deformity noted.  Genitourinary system: No hematuria noted.  Skin is warm and moist.  Psychosocial: Cooperative.  Vascular examination as previously noted no changes.    Current Facility-Administered Medications   Medication Dose Route Frequency Provider Last Rate Last Admin    0.9 % sodium chloride infusion   IntraVENous PRN Mariella Bennett PA-C        pantoprazole (PROTONIX) tablet 40 mg  40 mg Oral QAM AC Mariella Bennett PA-C   40 mg at 08/28/24 0630    0.9 % sodium chloride infusion   IntraVENous PRN Santiago Eugene PA-C        acetaminophen (TYLENOL) tablet 650 mg  650 mg Oral Q4H PRN Santiago Eugene PA-C   650 mg at 08/27/24 2247    ondansetron (ZOFRAN) injection 4 mg  4 mg IntraVENous Q6H PRN Anson Rick MD   4 mg at 08/24/24 0133    [Held by provider] amLODIPine (NORVASC) tablet 10 mg  10 mg Oral Daily Anson Rick MD   10 mg at 08/24/24 2238    [Held by provider] clopidogrel (PLAVIX) tablet 75 mg  75 mg Oral Daily Anson Rick MD   75 mg at 08/25/24 0935    nicotine (NICODERM CQ) 21 MG/24HR 1 patch  1 patch TransDERmal Daily Anson Rick MD        sodium chloride flush 0.9 % injection 5-40 mL  5-40 mL IntraVENous 2 times per day

## 2024-08-28 NOTE — CONSULTS
[address] [address] [place]  [address]                              CONSULTATION      PATIENT NAME: CHARLOTTE CORDOVA ANNDOB: 1960  MED REC NO: [id number]                       ROOM: 587  ACCOUNT NO: [id number]                       ADMIT DATE: 08/24/2024  PROVIDER: Jori Castellano MD    DATE OF SERVICE:  08/27/2024    ATTENDING PHYSICIAN:  KESLI MONTGOMERY    REASON FOR CONSULT:  Emotional lability capacity competency.      Saw the patient.  Chart reviewed including the ED doctor, Neurology consult and the hospitalist attending.    This is a 64-year-old  but   female patient was admitted to medical inpatient at [facility] ED, presents with dizziness, speech difficulties, worsening confusion en route. When I saw the patient, the patient is widely alert, awake, bright affect smiling, joking, oriented to the day of the week, place, month, how long she has been here, a bit happy.  Apparently, she had a stroke I believe was at [facility] from August 15 through August 20, 2024. Prior history of aneurysm of the brain, aneurysm operated on and recent COVID positive.    From previous stroke, she has a left upper and lower extremity weakness.  The patient denied depression, anxiety, mood swings, hallucination, delusions, paranoia.  Denies psychiatric treatment.  Denies prior psychiatric hospitalization.  She says she is not , but .  She has a son and daughter was working for state.  Denies alcohol abuse, drug abuse, nicotine abuse. Physical issues, hypertension, aneurysm in the brain and history of CVA.  She was seen by neurologist in consultation.  A  was done and unremarkable. Of course, there is a middle cerebral artery arthrosclerosis.    MENTAL STATUS:  Average height medium built female patient. Alert, verbal, polite, cooperative, articulate, coherent. Thoughts are linear, logical. Speech is clear, no

## 2024-08-28 NOTE — PROGRESS NOTES
4 Eyes Skin Assessment     NAME:  Sally Linn  YOB: 1960  MEDICAL RECORD NUMBER:  [id number]    The patient is being assessed for  Other    Weekly Wednesday 4 eyes.  I agree that at least one RN has performed a thorough Head to Toe Skin Assessment on the patient. ALL assessment sites listed below have been assessed.      Areas assessed by both nurses:    Head, Face, Ears, Shoulders, Back, Chest, Arms, Elbows, Hands, Sacrum. Buttock, Coccyx, Ischium, Legs. Feet and Heels, and Under Medical Devices         Does the Patient have a Wound? No noted wound(s)       Venkat Prevention initiated by RN: No  Wound Care Orders initiated by RN: No    Pressure Injury (Stage 3,4, Unstageable, DTI, NWPT, and Complex wounds) if present, place Wound referral order by RN under : No    New Ostomies, if present place, Ostomy referral order under : No     Nurse 1 eSignature: Electronically signed by Thi To RN on 8/28/24 at 5:04 AM EDT    **SHARE this note so that the co-signing nurse can place an eSignature**    Nurse 2 eSignature: Electronically signed by Diana Urban RN on 8/28/24 at 5:39 AM EDT

## 2024-08-29 ENCOUNTER — ANESTHESIA (OUTPATIENT)
Facility: HOSPITAL | Age: 64
End: 2024-08-29
Payer: COMMERCIAL

## 2024-08-29 ENCOUNTER — ANESTHESIA EVENT (OUTPATIENT)
Facility: HOSPITAL | Age: 64
End: 2024-08-29
Payer: COMMERCIAL

## 2024-08-29 LAB
ALBUMIN SERPL-MCNC: 3.4 G/DL (ref 3.5–5)
ALBUMIN/GLOB SERPL: 1.2 (ref 1.1–2.2)
ALP SERPL-CCNC: 70 U/L (ref 45–117)
ALT SERPL-CCNC: 27 U/L (ref 12–78)
ANION GAP SERPL CALC-SCNC: 8 MMOL/L (ref 5–15)
AST SERPL W P-5'-P-CCNC: 15 U/L (ref 15–37)
BASOPHILS # BLD: 0.1 K/UL (ref 0–0.1)
BASOPHILS NFR BLD: 1 % (ref 0–1)
BILIRUB SERPL-MCNC: 0.4 MG/DL (ref 0.2–1)
BUN SERPL-MCNC: 14 MG/DL (ref 6–20)
BUN/CREAT SERPL: 12 (ref 12–20)
CA-I BLD-MCNC: 9 MG/DL (ref 8.5–10.1)
CHLORIDE SERPL-SCNC: 108 MMOL/L (ref 97–108)
CO2 SERPL-SCNC: 25 MMOL/L (ref 21–32)
CREAT SERPL-MCNC: 1.14 MG/DL (ref 0.55–1.02)
DIFFERENTIAL METHOD BLD: ABNORMAL
EOSINOPHIL # BLD: 0.3 K/UL (ref 0–0.4)
EOSINOPHIL NFR BLD: 3 % (ref 0–7)
ERYTHROCYTE [DISTWIDTH] IN BLOOD BY AUTOMATED COUNT: 16.9 % (ref 11.5–14.5)
GLOBULIN SER CALC-MCNC: 2.9 G/DL (ref 2–4)
GLUCOSE SERPL-MCNC: 125 MG/DL (ref 65–100)
HCT VFR BLD AUTO: 28.6 % (ref 35–47)
HGB BLD-MCNC: 9.3 G/DL (ref 11.5–16)
IMM GRANULOCYTES # BLD AUTO: 0 K/UL (ref 0–0.04)
IMM GRANULOCYTES NFR BLD AUTO: 0 % (ref 0–0.5)
LYMPHOCYTES # BLD: 3.1 K/UL (ref 0.8–3.5)
LYMPHOCYTES NFR BLD: 31 % (ref 12–49)
MCH RBC QN AUTO: 29.7 PG (ref 26–34)
MCHC RBC AUTO-ENTMCNC: 32.5 G/DL (ref 30–36.5)
MCV RBC AUTO: 91.4 FL (ref 80–99)
MONOCYTES # BLD: 0.7 K/UL (ref 0–1)
MONOCYTES NFR BLD: 7 % (ref 5–13)
NEUTS SEG # BLD: 5.9 K/UL (ref 1.8–8)
NEUTS SEG NFR BLD: 58 % (ref 32–75)
NRBC # BLD: 0 K/UL (ref 0–0.01)
NRBC BLD-RTO: 0 PER 100 WBC
PLATELET # BLD AUTO: 328 K/UL (ref 150–400)
PMV BLD AUTO: 11.7 FL (ref 8.9–12.9)
POTASSIUM SERPL-SCNC: 4 MMOL/L (ref 3.5–5.1)
PROT SERPL-MCNC: 6.3 G/DL (ref 6.4–8.2)
RBC # BLD AUTO: 3.13 M/UL (ref 3.8–5.2)
SODIUM SERPL-SCNC: 141 MMOL/L (ref 136–145)
WBC # BLD AUTO: 10 K/UL (ref 3.6–11)

## 2024-08-29 PROCEDURE — 7100000011 HC PHASE II RECOVERY - ADDTL 15 MIN: Performed by: INTERNAL MEDICINE

## 2024-08-29 PROCEDURE — 6370000000 HC RX 637 (ALT 250 FOR IP): Performed by: PHYSICIAN ASSISTANT

## 2024-08-29 PROCEDURE — 2709999900 HC NON-CHARGEABLE SUPPLY: Performed by: INTERNAL MEDICINE

## 2024-08-29 PROCEDURE — 80053 COMPREHEN METABOLIC PANEL: CPT

## 2024-08-29 PROCEDURE — 97530 THERAPEUTIC ACTIVITIES: CPT

## 2024-08-29 PROCEDURE — 0DJ08ZZ INSPECTION OF UPPER INTESTINAL TRACT, VIA NATURAL OR ARTIFICIAL OPENING ENDOSCOPIC: ICD-10-PCS | Performed by: INTERNAL MEDICINE

## 2024-08-29 PROCEDURE — 6360000002 HC RX W HCPCS: Performed by: NURSE PRACTITIONER

## 2024-08-29 PROCEDURE — 36415 COLL VENOUS BLD VENIPUNCTURE: CPT

## 2024-08-29 PROCEDURE — 2500000003 HC RX 250 WO HCPCS: Performed by: NURSE PRACTITIONER

## 2024-08-29 PROCEDURE — 3600007512: Performed by: INTERNAL MEDICINE

## 2024-08-29 PROCEDURE — 7100000010 HC PHASE II RECOVERY - FIRST 15 MIN: Performed by: INTERNAL MEDICINE

## 2024-08-29 PROCEDURE — 2580000003 HC RX 258: Performed by: NURSE PRACTITIONER

## 2024-08-29 PROCEDURE — 3700000000 HC ANESTHESIA ATTENDED CARE: Performed by: INTERNAL MEDICINE

## 2024-08-29 PROCEDURE — 3700000001 HC ADD 15 MINUTES (ANESTHESIA): Performed by: INTERNAL MEDICINE

## 2024-08-29 PROCEDURE — 2580000003 HC RX 258: Performed by: INTERNAL MEDICINE

## 2024-08-29 PROCEDURE — 6370000000 HC RX 637 (ALT 250 FOR IP): Performed by: HOSPITALIST

## 2024-08-29 PROCEDURE — 6370000000 HC RX 637 (ALT 250 FOR IP): Performed by: INTERNAL MEDICINE

## 2024-08-29 PROCEDURE — 3600007502: Performed by: INTERNAL MEDICINE

## 2024-08-29 PROCEDURE — 85025 COMPLETE CBC W/AUTO DIFF WBC: CPT

## 2024-08-29 PROCEDURE — 1100000000 HC RM PRIVATE

## 2024-08-29 PROCEDURE — 6370000000 HC RX 637 (ALT 250 FOR IP): Performed by: STUDENT IN AN ORGANIZED HEALTH CARE EDUCATION/TRAINING PROGRAM

## 2024-08-29 RX ORDER — LIDOCAINE HYDROCHLORIDE 20 MG/ML
INJECTION, SOLUTION EPIDURAL; INFILTRATION; INTRACAUDAL; PERINEURAL PRN
Status: DISCONTINUED | OUTPATIENT
Start: 2024-08-29 | End: 2024-08-29 | Stop reason: SDUPTHER

## 2024-08-29 RX ORDER — SODIUM CHLORIDE, SODIUM LACTATE, POTASSIUM CHLORIDE, CALCIUM CHLORIDE 600; 310; 30; 20 MG/100ML; MG/100ML; MG/100ML; MG/100ML
INJECTION, SOLUTION INTRAVENOUS CONTINUOUS PRN
Status: DISCONTINUED | OUTPATIENT
Start: 2024-08-29 | End: 2024-08-29 | Stop reason: SDUPTHER

## 2024-08-29 RX ORDER — PROPOFOL 10 MG/ML
INJECTION, EMULSION INTRAVENOUS
Status: COMPLETED
Start: 2024-08-29 | End: 2024-08-29

## 2024-08-29 RX ORDER — LIDOCAINE HYDROCHLORIDE 20 MG/ML
INJECTION, SOLUTION EPIDURAL; INFILTRATION; INTRACAUDAL; PERINEURAL
Status: COMPLETED
Start: 2024-08-29 | End: 2024-08-29

## 2024-08-29 RX ADMIN — ATORVASTATIN CALCIUM 80 MG: 40 TABLET, FILM COATED ORAL at 21:30

## 2024-08-29 RX ADMIN — PROPOFOL 100 MG: 10 INJECTION, EMULSION INTRAVENOUS at 11:49

## 2024-08-29 RX ADMIN — Medication 5 MG: at 21:30

## 2024-08-29 RX ADMIN — LIDOCAINE HYDROCHLORIDE 100 MG: 20 SOLUTION INTRAVENOUS at 11:41

## 2024-08-29 RX ADMIN — SODIUM CHLORIDE, PRESERVATIVE FREE 10 ML: 5 INJECTION INTRAVENOUS at 21:30

## 2024-08-29 RX ADMIN — PROPOFOL 100 MG: 10 INJECTION, EMULSION INTRAVENOUS at 11:45

## 2024-08-29 RX ADMIN — AMLODIPINE BESYLATE 10 MG: 5 TABLET ORAL at 21:30

## 2024-08-29 RX ADMIN — ACETAMINOPHEN 650 MG: 325 TABLET ORAL at 02:05

## 2024-08-29 RX ADMIN — SODIUM CHLORIDE, POTASSIUM CHLORIDE, SODIUM LACTATE AND CALCIUM CHLORIDE: 600; 310; 30; 20 INJECTION, SOLUTION INTRAVENOUS at 11:28

## 2024-08-29 ASSESSMENT — PAIN DESCRIPTION - LOCATION: LOCATION: KNEE

## 2024-08-29 ASSESSMENT — PAIN SCALES - GENERAL
PAINLEVEL_OUTOF10: 0
PAINLEVEL_OUTOF10: 5
PAINLEVEL_OUTOF10: 0
PAINLEVEL_OUTOF10: 0

## 2024-08-29 ASSESSMENT — PAIN DESCRIPTION - DESCRIPTORS: DESCRIPTORS: ACHING

## 2024-08-29 ASSESSMENT — PAIN DESCRIPTION - ORIENTATION: ORIENTATION: RIGHT;LEFT

## 2024-08-29 NOTE — CONSULTS
Gastroenterology Consult Note        Patient: Sally Linn MRN: [id number]  SSN: [id number]    YOB: 1960  Age: 64 y.o.  Sex: female      Subjective:      Sally Linn is a 64 y.o. female who is being seen for anemai GI Bleeding .  64 y.o. female with PMH of hypertension and CVA. Patient recently at [facility] and was admitted for TIA (8/15-8/20). Was  COVID-19 positive. Patient was admitted to the [facility]  for further evaluation and management. DAPT, high-dose statin started. Neurology consulted. MRI brain with no acute infarct, did show evolution of chronic right posterior MCA territory infarct and unchanged other chronic infarcts, was on DAP agent now ASA and plavix held due to acute anemia , she received blood transfusion, now GI consultation placed , may need to restart DAP agent soon,  She is not good historian.   Past Medical History:   Diagnosis Date   • Brain aneurysm    • Hypertension      Past Surgical History:   Procedure Laterality Date   • ENDOVASCULAR REPAIR PERIPHERAL ANEURYSM        Family History   Family history unknown: Yes     Social History     Tobacco Use   • Smoking status: Every Day     Types: Cigarettes   • Smokeless tobacco: Never   Substance Use Topics   • Alcohol use: Yes     Comment: occasionally      Current Facility-Administered Medications   Medication Dose Route Frequency Provider Last Rate Last Admin   • 0.9 % sodium chloride infusion   IntraVENous PRN Mariella Bennett PA-C       • pantoprazole (PROTONIX) tablet 40 mg  40 mg Oral QAM AC Mariella Bennett PA-C   40 mg at 08/28/24 0630   • 0.9 % sodium chloride infusion   IntraVENous PRN Santiago Eugene PA-C       • acetaminophen (TYLENOL) tablet 650 mg  650 mg Oral Q4H PRN Santiago Eugene PA-C   650 mg at 08/28/24 2053   • ondansetron (ZOFRAN) injection 4 mg  4 mg IntraVENous Q6H PRN Anson Rick MD   4 mg at 08/24/24 0133   • [Held by provider] amLODIPine (NORVASC)

## 2024-08-29 NOTE — PROGRESS NOTES
Out of Endo 4 with transport and sitter in stable condition via bed.  Phone report was called to primary nurse.

## 2024-08-29 NOTE — PROGRESS NOTES
OCCUPATIONAL THERAPY TREATMENT  Patient: Sally Linn (64 y.o. female)  Date: 2024  Primary Diagnosis: Dizziness [R42]  Slurred speech [R47.81]  Stroke-like symptom [R29.90]  Procedure(s) (LRB):  ESOPHAGOGASTRODUODENOSCOPY (N/A) Day of Surgery   Precautions: Fall Risk                Recommendations for nursing mobility: Out of bed to chair for meals, Encourage HEP in prep for ADLs/mobility; see handout for details, Use of BSC for toileting , AD and gt belt for bed to chair , Amb to bathroom with AD and gait belt, and Assist x1    In place during session: EKG/telemetry   Chart, occupational therapy assessment, plan of care, and goals were reviewed.  ASSESSMENT  Patient continues with skilled OT services and is progressing towards goals. Pt semi supine in bed upon CORTEZ arrival, agreeable to session. Pt A&O x 4. Pt has 1:1 sitter. Pt appeared to have increase of agitation and questioning why she was here.  Pt required encouragement to participate in session and frequent v/c's to stay on task.  Pt used bsc and required set up for felicitas care.  Pt demonstrated slower pace w/ increased focus on safety awareness. Pt walked around bed w/o AE and sat in recliner w/ good hand placement. Pt completed UE therex, see grid below for details, to maintain/ increase strength and endurance to aid in adl performance. Pt became tearful and stated she wished she would have .  When asked if she was thinking to hurt herself she quickly stated no. Reported statement to RN. All known needs met and pt left w/ 1:1 sitter. (See below for objective details and assist levels).     Overall pt tolerated session fair today with rest breaks.  Current OT recommendations for discharge Moderate intensity short-term skilled occupational therapy up to 5x/week. Will continue to benefit from skilled OT services, and will continue to progress as tolerated.       GOALS:    Problem: Occupational Therapy - Adult  Goal: By Discharge:

## 2024-08-29 NOTE — PROGRESS NOTES
Hospitalist Progress Note    NAME:   Sally Linn   : 1960   MRN: [id number]     Date/Time: 2024 8:54 AM  Patient PCP: Sandra Correa APRN - NP    Estimated discharge date:24-48 hours  Barriers: EGD, psych clearance, placement.    Hospital course:  Sally Trinh is a 64 y.o. female with PMH of hypertension and CVA. Patient recently at [facility] and was admitted for TIA (8/15-). Found to have COVID-19 at that time and was treated with paxlovid. Initially presented to our ED for dizziness. CT head showed chronic right posterior MCA stroke, no acute perfusion abnormalities. CTA showed arthrosclerosis of the right vertebral artery, minimal flow to left vertebral artery. Diffuse bilateral middle cerebral artery atherosclerosis. 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 COVID-19 positive.  Patient was admitted to the hospital for further evaluation and management.  DAPT, high-dose statin started.  Neurology consulted. CTA head neck with new short segment lesion of right proximal vertebral artery which is patent distally, progressive severe atherosclerosis of the basilar artery with high-grade stenosis, recanalization of but severe stenosis in proximal posterior division M2 segment right MCA, moderate to severe atherosclerotic changes stable, s/p coil embolization.  Vascular surgery consulted and recommended outpatient cerebral angiogram. MRI brain with no acute infarct, showed progression of chronic infarcts. Of note patient with worsening anemia, hgb meeting criteria for blood transfusion. Patient refusing blood, unclear if medically competent, psychiatric consult placed. Transfusion risks discussed with patient's son, Roosevelt POTTER who was agreeable to blood transfusion. 2U PRBC given  with improvement and stable since. FOBT positive. GI consulted, given pt requiring DAPT for stroke prophylaxis plan for EGD to r/o occult bleed. Psych  reviewed.   Constitutional:       General: She is not in acute distress.     Appearance: Normal appearance. She is obese.   HENT:      Head: Normocephalic and atraumatic.   Eyes:      Extraocular Movements: Extraocular movements intact.      Conjunctiva/sclera: Conjunctivae normal.      Pupils: Pupils are equal, round, and reactive to light.   Cardiovascular:      Rate and Rhythm: Normal rate and regular rhythm.      Heart sounds: Murmur heard.   Pulmonary:      Effort: Pulmonary effort is normal. No respiratory distress.      Breath sounds: Normal breath sounds. No wheezing or rales.   Abdominal:      General: There is no distension.      Palpations: Abdomen is soft.      Tenderness: There is no abdominal tenderness.   Musculoskeletal:         General: No swelling or tenderness. Normal range of motion.      Right lower leg: No edema.      Left lower leg: No edema.   Skin:     General: Skin is warm and dry.   Neurological:      General: No focal deficit present.      Mental Status: She is alert. She is disoriented.      Comments: Patient oriented to person, place, year.  Disoriented to situation, events leading to hospitalization.   Psychiatric:         Mood and Affect: Mood is anxious. Affect is labile and tearful.      Comments: Cooperative but tearful          Reviewed most current lab test results and cultures  YES  Reviewed most current radiology test results   YES  Review and summation of old records today    NO  Reviewed patient's current orders and MAR    YES  PMH/SH reviewed - no change compared to H&P  ________________________________________________________________________  Care Plan discussed with:    Comments   Patient x    Family      RN x    Care Manager     Consultant                        Multidiciplinary team rounds were held today with , nursing, pharmacist and clinical coordinator.  Patient's plan of care was discussed; medications were reviewed and discharge planning was addressed.

## 2024-08-29 NOTE — PLAN OF CARE
Problem: Discharge Planning  Goal: Discharge to home or other facility with appropriate resources  Outcome: Progressing     Problem: Occupational Therapy - Adult  Goal: By Discharge: Performs self-care activities at highest level of function for planned discharge setting.  See evaluation for individualized goals.  Description: FUNCTIONAL STATUS PRIOR TO ADMISSION:  Pt and son report was able to complete most self-care tasks mod I, however, states pt had good and bad  days and occasionally required increased assistance. Pt and son report pt consistently required assist for bathing. Pt was independent for ambulation    HOME SUPPORT: The patient lived with son who provided assistance w/ higher level ADL and IADL tasks.    Occupational Therapy Goals:  Initiated 8/24/2024  Patient/Family stated goal: increase independence and decrease frustration with self-care tasks  1.  Patient will perform grooming with Modified [place] within 7 day(s).  2.  Patient will perform upper body dressing with Modified [place] within 7 day(s).  3.  Patient will perform lower body dressing with Modified [place] within 7 day(s).  4.  Patient will perform toilet transfers with Modified [place]  within 7 day(s).  5.  Patient will perform all aspects of toileting with [place] within 7 day(s).  6.  Patient will participate in upper extremity therapeutic exercise/activities with Modified [place] within 7 day(s).      8/28/2024 1257 by Veronica Pineda OTA  Outcome: Progressing     Problem: Skin/Tissue Integrity  Goal: Absence of new skin breakdown  Description: 1.  Monitor for areas of redness and/or skin breakdown  2.  Assess vascular access sites hourly  3.  Every 4-6 hours minimum:  Change oxygen saturation probe site  4.  Every 4-6 hours:  If on nasal continuous positive airway pressure, respiratory therapy assess nares and determine need for appliance change or resting period.  Outcome: Progressing     Problem:  Safety - Adult  Goal: Free from fall injury  Outcome: Progressing  Flowsheets (Taken 8/28/2024 1916)  Free From Fall Injury: Instruct family/caregiver on patient safety     Problem: Physical Therapy - Adult  Goal: By Discharge: Performs mobility at highest level of function for planned discharge setting.  See evaluation for individualized goals.  Description: FUNCTIONAL STATUS PRIOR TO ADMISSION: Patient was modified independent using a single point cane for functional mobility.    HOME SUPPORT PRIOR TO ADMISSION: The patient lived with son and was modified independent for ADLs.    Physical Therapy Goals  Initiated 8/24/2024  Pt stated goal: Get better  Pt will be I with LE HEP in 7 days.  Pt will perform bed mobility with Supervision in 7 days.  Pt will perform transfers with Supervision in 7 days.   Pt will amb 40 feet with LRAD safely with Modified [place] in 7 days.  Pt will verbalize and demonstrate compliance with fall precautions  in 7 days.   Pt will demonstrate improvement in standing/gait balance from poor/fair to fair/good in 7 days.    8/28/2024 1546 by Elda Yoon, CARL  Outcome: Progressing     Problem: Pain  Goal: Verbalizes/displays adequate comfort level or baseline comfort level  Outcome: Progressing     Problem: Chronic Conditions and Co-morbidities  Goal: Patient's chronic conditions and co-morbidity symptoms are monitored and maintained or improved  Outcome: Progressing

## 2024-08-29 NOTE — ANESTHESIA POSTPROCEDURE EVALUATION
Department of Anesthesiology  Postprocedure Note    Patient: Sally Linn  MRN: [id number]  YOB: 1960  Date of evaluation: 8/29/2024    Procedure Summary       Date: 08/29/24 Room / Location: [facility] 04 / [facility] ENDOSCOPY    Anesthesia Start: 1128 Anesthesia Stop: 1150    Procedure: ESOPHAGOGASTRODUODENOSCOPY (Upper GI Region) Diagnosis:       Gastrointestinal hemorrhage, unspecified gastrointestinal hemorrhage type      (Gastrointestinal hemorrhage, unspecified gastrointestinal hemorrhage type [K92.2])    Surgeons: Dutch Barrera MD Responsible Provider: Vishnu Servin Jr., MD    Anesthesia Type: MAC ASA Status: Not recorded            Anesthesia Type: MAC    Alyson Phase I:      Alyson Phase II:      Anesthesia Post Evaluation    Patient location during evaluation: bedside (Endoscopy Unit)  Patient participation: complete - patient participated  Level of consciousness: sleepy but conscious  Pain score: 0  Airway patency: patent  Nausea & Vomiting: no nausea and no vomiting  Cardiovascular status: hemodynamically stable  Respiratory status: acceptable  Hydration status: stable  Comments: This patient remained on the stretcher.  The patient was handed off to the endoscopy nursing team.  All questions regarding pre-, intra-, and postoperative care were answered.  Multimodal analgesia pain management approach    No notable events documented.

## 2024-08-29 NOTE — BSMART NOTE
Initial BSMART Liaison Assessment Form     Section I - Integrated Summary    Chief Complaint is \"emotional lability; competency.\"    LOS:  4 days    Presenting problem/Summary:  Patient is a 64 year old female seen face to face in her hospital room.  She was admitted medically after coming to the ER with dizziness, AMS/confusion, and both gait and speech difficulties.  She is currently positive for COVID-19.  Patient was noted to have been medically admitted to [facility] earlier this month due to a stroke, and she also has a history of a brain aneurysm.  Psychiatry was consulted due to concerns about being patient demonstrating emotional lability, as well as questions regarding her competency.  Upon assessment today, patient denied any history of mental health treatment.  She has never attempted suicide or been admitted to an inpatient psychiatric BHU.  She adamantly denied experiencing current suicidal or homicidal ideation.  She also denied experiencing symptoms of psychosis.  She said her sleep is \"fantastic\" and her appetite is \"good.\"  She denied a history of substance abuse.  While discussing patient's family (she is  with 3 adult children and 3 grandchildren), patient became tearful when discussing her grandchildren.  This clinician asked about why patient was emotional, and she asserted that she \"cries all the time,\" and this is baseline behavior for her.  She stated certain topics are more emotionally charged for her, and her grandchildren are one of these topics.  She said \"I just love them so much,\" and later said she feels like she doesn't get to see them enough.  She appears to be stable and no significant concerns were noted by this clinician.    Precipitant Factors are chronic medical concerns.    The information is given by the patient and past medical records.  Current Psychiatrist and/or  is NA.  Previous Hospitalizations/Treatment: denies    Lethality Assessment:  The potential for  suicide is not noted.    The potential for homicide is not noted.  The patient has not been a perpetrator of sexual or physical abuse.    There are not pending charges.  The patient is not felt to be at risk for self-harm or harm to others.      Section II - Psychosocial  The patient's overall mood and attitude is withdrawn.  Feelings of helplessness and hopelessness are not observed.  Generalized anxiety is not observed.  Panic is not observed. Phobias are not observed.  Obsessive compulsive tendencies are not observed.      Section III - Mental Status Exam  The patient's appearance shows no evidence of impairment.  The patient's behavior shows no evidence of impairment. The patient is oriented to time, place, person and situation.  The patient's speech shows no evidence of impairment.  The patient's mood is withdrawn.  The range of affect is constricted.  The patient's thought content demonstrates no evidence of impairment.  The thought process shows no evidence of impairment.  The patient's perception shows no evidence of impairment. The patient's memory shows no evidence of impairment.  The patient's appetite shows no evidence of impairment.  The patient's sleep shows no evidence of impairment. The patient's insight shows no evidence of impairment.  The patient's judgement shows no evidence of impairment.      The patient has demonstrated mental capacity to provide informed consent.    Section IV - Substance Abuse  The patient is not using substances.  UDS result: not ordered  BAL: not ordered    Section V - Medical  Past Medical History:   Diagnosis Date    Brain aneurysm     Hypertension        Section VI - Living Arrangements  The patient is .  The patient lives with her adult son. The patient has 3 adult children.  The patient does plan to return home upon discharge. The patient's source of income comes from social security.    The patient's greatest support comes from her daughter and this person

## 2024-08-29 NOTE — PROGRESS NOTES
Pt somewhat sad at beginning of shift. Wants to go home. Nurse explained why she was still here, and that she is to go for a procedure today at 1300, and that she was NPO after 0500. Sitter made aware as well.     Received tylenol for knee pain. Pt satisfied.   Was able to get some good rest.     Tolerated all meds, drinks, food.

## 2024-08-29 NOTE — CARE COORDINATION
DC Plan: Disposition pending    Pt was accepted with [facility] and [place] [facility].     Baron spoke with ITZEL Barajas CM for [facility] [phone]. Baron informed her pt was accepted with [facility]. Based on conversation with Jenn, [facility] is not going to authorize IRF or SNF level of care. Their therapy team indicated pt can return home and receive PT/OT at [facility] for 5 days a week.     Cm attempted to contact daughter - Sophia Lang [phone]. CM left a voice message. Awaiting phone call.     0070    Cm received a returned call from daughter. Baron provided her with an update. Daughter expressed concerns with pt going to [facility] for therapy. Baron informed her writer will f/up with Jenn and ask Jenn to call her.     Cm attempted to contact Jenn. Baron left a voice message.     ___________________________________________    Baron received a returned call from Jenn. Baron informed her of daughter's concerns. Jenn will speak with her team and contact daughter tomorrow.

## 2024-08-30 VITALS
TEMPERATURE: 98.1 F | OXYGEN SATURATION: 96 % | HEART RATE: 76 BPM | SYSTOLIC BLOOD PRESSURE: 116 MMHG | BODY MASS INDEX: 29.19 KG/M2 | DIASTOLIC BLOOD PRESSURE: 63 MMHG | WEIGHT: 186 LBS | HEIGHT: 67 IN | RESPIRATION RATE: 16 BRPM

## 2024-08-30 LAB
ALBUMIN SERPL-MCNC: 3.2 G/DL (ref 3.5–5)
ALBUMIN/GLOB SERPL: 0.9 (ref 1.1–2.2)
ALP SERPL-CCNC: 70 U/L (ref 45–117)
ALT SERPL-CCNC: 30 U/L (ref 12–78)
ANION GAP SERPL CALC-SCNC: 4 MMOL/L (ref 5–15)
AST SERPL W P-5'-P-CCNC: 15 U/L (ref 15–37)
BASOPHILS # BLD: 0 K/UL (ref 0–0.1)
BASOPHILS NFR BLD: 0 % (ref 0–1)
BILIRUB SERPL-MCNC: 0.4 MG/DL (ref 0.2–1)
BUN SERPL-MCNC: 15 MG/DL (ref 6–20)
BUN/CREAT SERPL: 14 (ref 12–20)
CA-I BLD-MCNC: 9 MG/DL (ref 8.5–10.1)
CHLORIDE SERPL-SCNC: 110 MMOL/L (ref 97–108)
CO2 SERPL-SCNC: 27 MMOL/L (ref 21–32)
CREAT SERPL-MCNC: 1.09 MG/DL (ref 0.55–1.02)
DIFFERENTIAL METHOD BLD: ABNORMAL
EOSINOPHIL # BLD: 0.4 K/UL (ref 0–0.4)
EOSINOPHIL NFR BLD: 4 % (ref 0–7)
ERYTHROCYTE [DISTWIDTH] IN BLOOD BY AUTOMATED COUNT: 16.6 % (ref 11.5–14.5)
GLOBULIN SER CALC-MCNC: 3.4 G/DL (ref 2–4)
GLUCOSE SERPL-MCNC: 134 MG/DL (ref 65–100)
HCT VFR BLD AUTO: 28.5 % (ref 35–47)
HGB BLD-MCNC: 9.2 G/DL (ref 11.5–16)
IMM GRANULOCYTES # BLD AUTO: 0 K/UL (ref 0–0.04)
IMM GRANULOCYTES NFR BLD AUTO: 0 % (ref 0–0.5)
LYMPHOCYTES # BLD: 2.7 K/UL (ref 0.8–3.5)
LYMPHOCYTES NFR BLD: 26 % (ref 12–49)
MCH RBC QN AUTO: 29.5 PG (ref 26–34)
MCHC RBC AUTO-ENTMCNC: 32.3 G/DL (ref 30–36.5)
MCV RBC AUTO: 91.3 FL (ref 80–99)
MONOCYTES # BLD: 0.7 K/UL (ref 0–1)
MONOCYTES NFR BLD: 6 % (ref 5–13)
NEUTS SEG # BLD: 6.6 K/UL (ref 1.8–8)
NEUTS SEG NFR BLD: 64 % (ref 32–75)
NRBC # BLD: 0 K/UL (ref 0–0.01)
NRBC BLD-RTO: 0 PER 100 WBC
PLATELET # BLD AUTO: 331 K/UL (ref 150–400)
PMV BLD AUTO: 11.7 FL (ref 8.9–12.9)
POTASSIUM SERPL-SCNC: 3.9 MMOL/L (ref 3.5–5.1)
PROT SERPL-MCNC: 6.6 G/DL (ref 6.4–8.2)
RBC # BLD AUTO: 3.12 M/UL (ref 3.8–5.2)
SODIUM SERPL-SCNC: 141 MMOL/L (ref 136–145)
WBC # BLD AUTO: 10.4 K/UL (ref 3.6–11)

## 2024-08-30 PROCEDURE — 97116 GAIT TRAINING THERAPY: CPT

## 2024-08-30 PROCEDURE — 97110 THERAPEUTIC EXERCISES: CPT

## 2024-08-30 PROCEDURE — 36415 COLL VENOUS BLD VENIPUNCTURE: CPT

## 2024-08-30 PROCEDURE — 97530 THERAPEUTIC ACTIVITIES: CPT

## 2024-08-30 PROCEDURE — 2580000003 HC RX 258: Performed by: INTERNAL MEDICINE

## 2024-08-30 PROCEDURE — 6370000000 HC RX 637 (ALT 250 FOR IP): Performed by: STUDENT IN AN ORGANIZED HEALTH CARE EDUCATION/TRAINING PROGRAM

## 2024-08-30 PROCEDURE — 85025 COMPLETE CBC W/AUTO DIFF WBC: CPT

## 2024-08-30 PROCEDURE — 80053 COMPREHEN METABOLIC PANEL: CPT

## 2024-08-30 RX ORDER — FERROUS SULFATE 325(65) MG
325 TABLET ORAL 2 TIMES DAILY
Qty: 180 TABLET | Refills: 1 | Status: SHIPPED | OUTPATIENT
Start: 2024-08-30

## 2024-08-30 RX ORDER — ASPIRIN 81 MG/1
81 TABLET ORAL DAILY
Status: DISCONTINUED | OUTPATIENT
Start: 2024-08-30 | End: 2024-08-30 | Stop reason: HOSPADM

## 2024-08-30 RX ORDER — PANTOPRAZOLE SODIUM 40 MG/1
40 TABLET, DELAYED RELEASE ORAL
Qty: 60 TABLET | Refills: 0 | Status: SHIPPED | OUTPATIENT
Start: 2024-08-30

## 2024-08-30 RX ORDER — ASPIRIN 81 MG/1
81 TABLET ORAL DAILY
Qty: 30 TABLET | Refills: 3 | Status: SHIPPED | OUTPATIENT
Start: 2024-08-30

## 2024-08-30 RX ADMIN — SODIUM CHLORIDE, PRESERVATIVE FREE 10 ML: 5 INJECTION INTRAVENOUS at 08:46

## 2024-08-30 RX ADMIN — ASPIRIN 81 MG: 81 TABLET, COATED ORAL at 18:42

## 2024-08-30 RX ADMIN — PANTOPRAZOLE SODIUM 40 MG: 40 TABLET, DELAYED RELEASE ORAL at 08:45

## 2024-08-30 ASSESSMENT — PAIN SCALES - GENERAL: PAINLEVEL_OUTOF10: 0

## 2024-08-30 NOTE — PROGRESS NOTES
HOURLY ROUNDING    PT ACTIVITY: resting with eyes closed, respirations even and unlabored    PAIN: none noted  BATHROOM: deferred to promote rest  POSITION: independent  PATHWAY: clear  POSSESSION: within reach    Sitter at bedside. Care plan ongoing.

## 2024-08-30 NOTE — PROGRESS NOTES
RN called DR Castellano concerning status of patient for clrearance for discharge home.    Provider states that patient is not suicidal and is okay to discharge home

## 2024-08-30 NOTE — PROGRESS NOTES
Gastroenterology Progress Note        Patient: Sally Linn MRN: [id number]  SSN: [id number]    YOB: 1960  Age: 64 y.o.  Sex: female      Admit Date: 8/24/2024    LOS: 6 days     Subjective:   Patient had no shortness of breath no nausea vomiting.  Past Medical History:   Diagnosis Date    Brain aneurysm     Hypertension         Current Facility-Administered Medications:     melatonin tablet 5 mg, 5 mg, Oral, Nightly PRN, Ricky Martínez MD, 5 mg at 08/29/24 2130    0.9 % sodium chloride infusion, , IntraVENous, PRN, Mariella Bennett PA-C    pantoprazole (PROTONIX) tablet 40 mg, 40 mg, Oral, QAM AC, Mariella Bennett PA-C, 40 mg at 08/30/24 0845    0.9 % sodium chloride infusion, , IntraVENous, PRN, Santiago Eugene PA-C    acetaminophen (TYLENOL) tablet 650 mg, 650 mg, Oral, Q4H PRN, Santiago Eugene PA-C, 650 mg at 08/29/24 0205    ondansetron (ZOFRAN) injection 4 mg, 4 mg, IntraVENous, Q6H PRN, Anson Rick MD, 4 mg at 08/24/24 0133    amLODIPine (NORVASC) tablet 10 mg, 10 mg, Oral, Daily, Mariella Bennett PA-C, 10 mg at 08/29/24 2130    [Held by provider] clopidogrel (PLAVIX) tablet 75 mg, 75 mg, Oral, Daily, Anson Rick MD, 75 mg at 08/25/24 0935    nicotine (NICODERM CQ) 21 MG/24HR 1 patch, 1 patch, TransDERmal, Daily, Anson Rick MD    sodium chloride flush 0.9 % injection 5-40 mL, 5-40 mL, IntraVENous, 2 times per day, Anson Rick MD, 10 mL at 08/30/24 0846    sodium chloride flush 0.9 % injection 5-40 mL, 5-40 mL, IntraVENous, PRN, Anson Rick MD    0.9 % sodium chloride infusion, , IntraVENous, PRN, Anson Rick MD    ondansetron (ZOFRAN-ODT) disintegrating tablet 4 mg, 4 mg, Oral, Q8H PRN **OR** ondansetron (ZOFRAN) injection 4 mg, 4 mg, IntraVENous, Q6H PRN, Anson Rick MD    polyethylene glycol (GLYCOLAX) packet 17 g, 17 g, Oral, Daily PRN, Anson Rick MD    [Held by provider] enoxaparin (LOVENOX) injection 40 mg, 40 mg,

## 2024-08-30 NOTE — PROGRESS NOTES
HOURLY ROUNDING    PT ACTIVITY: asleep, easy to arouse with voice    PAIN: denies  BATHROOM: assisted to BR and back to bed  POSITION: pillows placed under knees, blanket for comfort  PATHWAY: clear  POSSESSION: within reach    1:1 sitter at bedside. Pt verbalized no other needs at this time. Care plan ongoing.

## 2024-08-30 NOTE — PROGRESS NOTES
OCCUPATIONAL THERAPY TREATMENT  Patient: Sally Linn (64 y.o. female)  Date: 8/30/2024  Primary Diagnosis: Dizziness [R42]  Slurred speech [R47.81]  Stroke-like symptom [R29.90]  Procedure(s) (LRB):  ESOPHAGOGASTRODUODENOSCOPY (N/A) 1 Day Post-Op   Precautions: Fall Risk                Recommendations for nursing mobility: Out of bed to chair for meals, Encourage HEP in prep for ADLs/mobility; see handout for details, Use of BSC for toileting , AD and gt belt for bed to chair , Amb to bathroom with AD and gait belt, and Assist x1    In place during session: None  Chart, occupational therapy assessment, plan of care, and goals were reviewed.  ASSESSMENT  Patient continues with skilled OT services and is progressing towards goals. Pt semi supine in bed upon CORTEZ arrival, agreeable to session. Pt A&O x 4. Pt reported that she felt better today.  Pt declined oob activity d/t being cold. Pt completed UE therex, see grid below for details, to maintain/ increase strength and endurance to aid in adl performance. Pt able to progress w/ therex, using 2 lb weight.  Education provided on energy conservation, safety awareness and HEP w/ pt verbalizing good understanding.  Pt left w/ all known needs met and w/ 1:1 sitter.   (See below for objective details and assist levels).     Overall pt tolerated session fair today with rest breaks. Current OT recommendations for discharge Moderate intensity short-term skilled occupational therapy up to 5x/week. Will continue to benefit from skilled OT services, and will continue to progress as tolerated.       GOALS:    Problem: Occupational Therapy - Adult  Goal: By Discharge: Performs self-care activities at highest level of function for planned discharge setting.  See evaluation for individualized goals.  Description: FUNCTIONAL STATUS PRIOR TO ADMISSION:  Pt and son report was able to complete most self-care tasks mod I, however, states pt had good and bad  days and

## 2024-08-30 NOTE — CARE COORDINATION
DC Plan: Disposition pending    1310    Cm attempted to contact ITZEL Barajas CM for [facility] [phone]. Cm left a voice message.     1508    Cm called ITZEL Barajas CM for [facility] again. CM informed her of anticipated dc today or tomorrow pending psych clearance. Case discussed. Jenn indicated she will call writer back. If pt discharges today, [facility] will not be able to provide transport. Cm was informed the hospital will have to set up transport.     Raj called Dr. Castellano. Dr. Castellano cleared pt for discharge. Cm was informed pt is not SI.     Awaiting signed psych MD note prior to discharging pt.   _____________________________________________    Cm met with pt at the bedside. Tech was present. Cm informed pt of possible dc today or tomorrow. Cm discussed pt's dc plan.      Cm called Jenn from [facility]. Jenn spoke with daughter and had a discussion regarding pt just going to [facility] for PT/OT only. Jenn indicated daughter wanted to speak with her brother before making a decision. Jenn will call writer back.     RAJ spoke with Jenn. Patient will be returning home. Family will provide transport.     Cm updated attending via [employer].     Raj spoke with daughter - Sophia Lang [phone]. Daughter indicated her or her brother - Earlene Linn [phone] will provide transport home.     Nurse will need to contact family when pt is ready for dc.     Raj spoke with pt's nurse and updated her.

## 2024-08-30 NOTE — PROGRESS NOTES
Lymphocytes % 26 12 - 49 %    Monocytes % 6 5 - 13 %    Eosinophils % 4 0 - 7 %    Basophils % 0 0 - 1 %    Immature Granulocytes % 0 0 - 0.5 %    Neutrophils Absolute 6.6 1.8 - 8.0 K/UL    Lymphocytes Absolute 2.7 0.8 - 3.5 K/UL    Monocytes Absolute 0.7 0.0 - 1.0 K/UL    Eosinophils Absolute 0.4 0.0 - 0.4 K/UL    Basophils Absolute 0.0 0.0 - 0.1 K/UL    Immature Granulocytes Absolute 0.0 0.00 - 0.04 K/UL    Differential Type AUTOMATED     Comprehensive Metabolic Panel w/ Reflex to MG    Collection Time: 24  7:49 AM   Result Value Ref Range    Sodium 141 136 - 145 mmol/L    Potassium 3.9 3.5 - 5.1 mmol/L    Chloride 110 (H) 97 - 108 mmol/L    CO2 27 21 - 32 mmol/L    Anion Gap 4 (L) 5 - 15 mmol/L    Glucose 134 (H) 65 - 100 mg/dL    BUN 15 6 - 20 mg/dL    Creatinine 1.09 (H) 0.55 - 1.02 mg/dL    BUN/Creatinine Ratio 14 12 - 20      Est, Glom Filt Rate 56 (L) >60 ml/min/1.73m2    Calcium 9.0 8.5 - 10.1 mg/dL    Total Bilirubin 0.4 0.2 - 1.0 mg/dL    AST 15 15 - 37 U/L    ALT 30 12 - 78 U/L    Alk Phosphatase 70 45 - 117 U/L    Total Protein 6.6 6.4 - 8.2 g/dL    Albumin 3.2 (L) 3.5 - 5.0 g/dL    Globulin 3.4 2.0 - 4.0 g/dL    Albumin/Globulin Ratio 0.9 (L) 1.1 - 2.2        No results found for: \"VALAC\", \"VALP\", \"CARB2\"  No results found for: \"LITHM\"   RADIOLOGY REPORTS:(reviewed/updated 2024)  EGD    Result Date: 2024  [facility] Patient: CHARLOTTE VANCE MRN: [id number] : 1960 Account: [id number] Sex at Birth: Female Age: 64 Years Procedure: Upper GI endoscopy Date: 2024 Attending Physician: FANNY CORDERO Indications:        -  Acute post hemorrhagic anemia        -  Active gastrointestinal bleeding        -  Dyspepsia Medications:        -  See the Anesthesia note for documentation of the administered medications Complications:        -  No immediate complications. Estimated Blood Loss:        -  Estimated blood loss: None. Procedure:        -  Prior to the procedure,  Angelia Byrne    CT BRAIN PERFUSION    Result Date: 8/24/2024  ****PRELIMINARY REPORT**** Compared to CTA 2/15/2024 Chronic right posterior MCA territory infarction with no acute perfusion abnormality. Scattered atherosclerosis. Diffuse right vertebral atherosclerosis slightly diminutive in caliber though grossly patent. Markedly diminutive and irregular left vertebral artery with minimal flow similar to prior study. Extensive atherosclerosis of the right V4 vertebral artery segment. Diminutive and irregular basilar artery with bilateral posterior communicating arteries supplying the posterior cerebral arteries, with diffuse atherosclerosis. Diffuse bilateral middle cerebral artery atherosclerosis. Focal area of occlusion at the right posterior M2 origin slightly improved compared to prior study with multifocal stenosis in the distal M2 and M3 branches. No definite acute occlusion. 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 No venous thrombosis Preliminary report was provided by Dr. Tim the on-call radiologist. Final report to follow. ****END PRELIMINARY REPORT**** CLINICAL HISTORY: Code stroke, dizziness, weakness EXAMINATION:  CT ANGIOGRAPHY HEAD AND NECK COMPARISON: CT head 8/24/2024, MR brain 2/15/2024, CTA head and neck 2/15/2024 TECHNIQUE:  Following the uneventful administration of iodinated contrast material, axial CT angiography of the head and neck was performed. Delayed axial images through the head were also obtained. Coronal and sagittal reconstructions were obtained. Manual postprocessing of images was performed. 3-D  Sagittal maximal intensity projection images were obtained.  3-D Coronal maximal intensity projections were obtained.  CT dose reduction was achieved through use of a standardized protocol tailored for this examination and automatic exposure control for dose modulation. CT perfusion analysis was performed using CT with contrast administration, including

## 2024-08-30 NOTE — PROGRESS NOTES
Discharge plan of care/case management plan validated with provider discharge order.  Discharge instructions discussed with he patient and son. All concerns addressed at this time. PIV removed.  Patient wheeled downstairs to go home

## 2024-08-30 NOTE — DISCHARGE SUMMARY
Discharge Summary    Name: Sally Linn  [id number]  YOB: 1960 (Age: 64 y.o.)   Date of Admission: 8/24/2024  Date of Discharge: 8/30/2024  Attending Physician: Zachary Shepard MD    Discharge Diagnosis:   Principal Problem:    Dizziness  HTN  CVA  Iron deficiency anemia  Positive stool occult  Emotional lability  TIA  Resolved Problems:    * No resolved hospital problems. *       Consultations:  IP CONSULT TO TELE-NEUROLOGY  IP CONSULT TO VASCULAR SURGERY  IP CONSULT TO PSYCHIATRY  IP CONSULT TO GI      Brief Admission History/Reason for Admission Per Anson Ballesteros Cha, MD:   Brief Hospital Course by Main Problems:   Sally Trinh is a 64 y.o. female with PMH of hypertension and CVA. Patient recently at [facility] and was admitted for TIA (8/15-8/20). Found to have COVID-19 at that time and was treated with paxlovid. Initially presented to our ED for dizziness. CT head showed chronic right posterior MCA stroke, no acute perfusion abnormalities. CTA showed arthrosclerosis of the right vertebral artery, minimal flow to left vertebral artery. Diffuse bilateral middle cerebral artery atherosclerosis. 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 COVID-19 positive. Patient was admitted to the hospital for further evaluation and management. DAPT, high-dose statin started. Neurology consulted. CTA head neck with new short segment lesion of right proximal vertebral artery which is patent distally, progressive severe atherosclerosis of the basilar artery with high-grade stenosis, recanalization of but severe stenosis in proximal posterior division M2 segment right MCA, moderate to severe atherosclerotic changes stable, s/p coil embolization. Vascular surgery consulted and recommended outpatient cerebral angiogram. MRI brain with no acute infarct, showed progression of chronic infarcts. No further intervention per Neurology. Of  Losartan discontinued due to low blood pressure     Hypokalemia  - Improved     Essential hypertension   - Blood pressure at goal  - Continue amlodipine     HLP   - Continue statin  - lipid profile as above.      Leukocytosis  -Resolved    Discharge Exam:  Patient seen and examined by me on discharge day.  Patient without complaints of pain but feels she is \"trapped \".  Urinating and having bowel movements with difficulty.  No reported bright red blood per rectum.  Discussed EGD results.  Discussed patient's reports of \"wanting to die \"yesterday, patient denies current SI or HI.  Pertinent Findings:  Patient Vitals for the past 24 hrs:   BP Temp Temp src Pulse Resp SpO2   08/30/24 1427 116/63 98.1 °F (36.7 °C) Oral 76 -- 96 %   08/30/24 1148 (!) 126/54 98.2 °F (36.8 °C) Oral 74 16 100 %   08/30/24 0840 119/75 98.6 °F (37 °C) Oral 70 18 100 %   08/30/24 0317 (!) 124/57 98.4 °F (36.9 °C) Oral 73 17 100 %   08/29/24 2304 (!) 147/60 97.9 °F (36.6 °C) Oral 73 17 98 %   08/29/24 1925 134/64 98.1 °F (36.7 °C) Oral 80 17 100 %   08/29/24 1457 (!) 147/70 98.4 °F (36.9 °C) Temporal 75 -- --       Physical Exam  Vitals reviewed.   Constitutional:       General: She is not in acute distress.     Appearance: Normal appearance. She is obese.   HENT:      Head: Normocephalic and atraumatic.   Eyes:      Extraocular Movements: Extraocular movements intact.      Conjunctiva/sclera: Conjunctivae normal.      Pupils: Pupils are equal, round, and reactive to light.   Cardiovascular:      Rate and Rhythm: Normal rate and regular rhythm.      Heart sounds: Murmur heard.   Pulmonary:      Effort: Pulmonary effort is normal. No respiratory distress.      Breath sounds: Normal breath sounds. No wheezing or rales.   Abdominal:      General: There is no distension.      Palpations: Abdomen is soft.      Tenderness: There is no abdominal tenderness.   Musculoskeletal:         General: No swelling or tenderness. Normal range of motion.

## 2024-08-30 NOTE — PLAN OF CARE
Problem: Discharge Planning  Goal: Discharge to home or other facility with appropriate resources  8/29/2024 2016 by Mindi Wan RN  Outcome: Progressing  Flowsheets (Taken 8/29/2024 2000)  Discharge to home or other facility with appropriate resources:   Identify barriers to discharge with patient and caregiver   Arrange for needed discharge resources and transportation as appropriate   Identify discharge learning needs (meds, wound care, etc)   Refer to discharge planning if patient needs post-hospital services based on physician order or complex needs related to functional status, cognitive ability or social support system     Problem: Skin/Tissue Integrity  Goal: Absence of new skin breakdown  Description: 1.  Monitor for areas of redness and/or skin breakdown  2.  Assess vascular access sites hourly  3.  Every 4-6 hours minimum:  Change oxygen saturation probe site  4.  Every 4-6 hours:  If on nasal continuous positive airway pressure, respiratory therapy assess nares and determine need for appliance change or resting period.  8/29/2024 2016 by Mindi Wan RN  Outcome: Progressing     Problem: Safety - Adult  Goal: Free from fall injury  8/29/2024 2016 by Mindi Wan RN  Outcome: Progressing     Problem: Pain  Goal: Verbalizes/displays adequate comfort level or baseline comfort level  Outcome: Progressing  Flowsheets (Taken 8/29/2024 2000)  Verbalizes/displays adequate comfort level or baseline comfort level:   Encourage patient to monitor pain and request assistance   Assess pain using appropriate pain scale   Administer analgesics based on type and severity of pain and evaluate response   Implement non-pharmacological measures as appropriate and evaluate response   Notify Licensed Independent Practitioner if interventions unsuccessful or patient reports new pain     Problem: Chronic Conditions and Co-morbidities  Goal: Patient's chronic conditions and co-morbidity symptoms are

## 2024-08-30 NOTE — PROGRESS NOTES
administration of iodinated contrast material, axial CT angiography of the head and neck was performed. Delayed axial images through the head were also obtained. Coronal and sagittal reconstructions were obtained. Manual postprocessing of images was performed. 3-D  Sagittal maximal intensity projection images were obtained.  3-D Coronal maximal intensity projections were obtained.  CT dose reduction was achieved through use of a standardized protocol tailored for this examination and automatic exposure control for dose modulation. CT perfusion analysis was performed using CT with contrast administration, including postprocessing of parametric maps with the determination of cerebral blood flow, cerebral blood volume, and mean transit time. CT dose reduction was achieved through use of a standardized protocol tailored for this examination and automatic exposure control for dose modulation. This study was analyzed by the [facility]ai algorithm FINDINGS: Delayed contrast-enhanced head CT: The ventricles are midline without hydrocephalus.  There is no acute intra or extra-axial hemorrhage. Mild to moderate bilateral subcortical and periventricular areas of patchy low attenuation is nonspecific but likely related to changes of chronic small vessel ischemic disease. Chronic appearing right parietotemporal, left caudate/frontal, and pontine infarctions. Sequelae of anterior communicating artery aneurysm coil embolization. The basal cisterns are clear.  The paranasal sinuses are clear. CTA NECK: Great vessels: Patent great vessel origins Right subclavian artery: Mild atherosclerosis Left subclavian artery: Mild to moderate atherosclerosis Right common carotid artery: Mild to moderate atherosclerosis Left common carotid artery: Mild to moderate atherosclerosis Cervical right internal carotid artery: Patent with proximal atherosclerosis causing less than 50% stenosis by NASCET criteria. Cervical left internal carotid artery: Patent  mg, Oral, Daily chlorthalidone, 12.5 mg, Oral, Daily clopidogrel, 75 mg, Oral, Daily famotidine, 20 mg, Oral, BID heparin (porcine), 5,000 Units, Subcutaneous, q8h SYLVIA losartan, 100 mg, Oral, Daily therapeutic multivitamin, 1 tablet, Oral, Daily dextrose, 0-200 mL/hr dextrose, 0-175 mL/hr   PRN medications: acetaminophen **OR** acetaminophen, dextrose, dextrose, dextrose, dextrose, glucagon, glucose **OR** glucose **OR** glucose, insulin lispro, lidocaine, LORazepam, Insert peripheral IV **AND** [COMPLETED] Saline lock IV **AND** sodium chloride flush Technique: A 23 channel video-electroencephalogram (VEEG) recording using a modified International 10-20 system (21 EEG channels and 2 ECG channels) was performed on the [employer] System.  Data were recorded at a sampling rate of 256 Hz.  Clinical events were marked on the EEG record via an event button controlled by the patient and/or family and/or clinical staff. All clinical events as well as extensive random background samples including wakefulness, drowsiness, and sleep were reviewed. Activation maneuvers: Not performed Description of Findings: Background:  Symmetry: symmetric  Predominant background frequencies: beta, alpha, and theta  Posterior dominant rhythm:  8  Voltage: normal   Anterior to posterior (AP) gradient: present  Reactivity: yes  Variability: yes  Continuity: continuous State changes: present but with abnormal stage N2 sleep transients Breach effect: absent Cyclic alternating pattern of encephalopathy (CAPE): absent Focal slowin.  Frequent, right frontotemporal, polymorphic delta and theta slowing Sporadic Epileptiform Discharges: None Other Paroxysmal Non-Epileptiform Findings:   None. Rhythmic or Periodic Patterns: none present Brief Potentially Ictal Rhythmic Discharges (BIRDs): none present Ictal-Interictal Continuum (IIC): no Clinical events, electrographic seizures and electroclinical seizures: no Non-epileptic events: no Activation

## 2024-08-30 NOTE — PROGRESS NOTES
Comprehensive Nutrition Assessment    Type and Reason for Visit:  Reassess    Nutrition Recommendations/Plan:   Monitor po intake,weight and labs     Malnutrition Assessment:  Malnutrition Status:  Insufficient data (08/26/24 2054)    Context:  Acute Illness     Findings of the 6 clinical characteristics of malnutrition:      Nutrition Assessment:    Pt assessed for MST 2- weight loss and poor appetite prior to admission. Pt admitted for dizziness TIA vs. CVA, covid-19. Pt assessed by SLP and approved for regular/thin diet. Pt currently ordered a low sodium diet. Meds: lipitor, protonix. Labs: Cl 112 H, BUN 29 H, Creat 1.13 H, Glucose 155 H. 08/30/24 Follow up: current po intake fair at ~50%, no noted weight change, continues on a Regular diet. Meds and labs reviewed.    Nutrition Related Findings:    PO intakes 51-75%. No BM documeted this admission. Wound Type: None       Current Nutrition Intake & Therapies:    Average Meal Intake: 51-75%  Average Supplements Intake: None Ordered  ADULT DIET; Regular    Anthropometric Measures:  Height: 170.2 cm (5' 7\")  Ideal Body Weight (IBW): 135 lbs (61 kg)       Current Body Weight: 84.4 kg (186 lb), 137.8 % IBW. Weight Source: Standing Scale  Current BMI (kg/m2): 29.1        Weight Adjustment For: No Adjustment                 BMI Categories: Overweight (BMI 25.0-29.9)    Estimated Daily Nutrient Needs:  Energy Requirements Based On: Kcal/kg  Weight Used for Energy Requirements: Current  Energy (kcal/day): 2100-2520kcals (25-30kcal/kg)  Weight Used for Protein Requirements: Current  Protein (g/day): 67-100g (0.8-1.0g/kg)  Method Used for Fluid Requirements: 1 ml/kcal  Fluid (ml/day):      Nutrition Diagnosis:   Predicted inadequate energy intake related to cognitive or neurological impairment, acute injury/trauma as evidenced by intake 51-75%    Nutrition Interventions:   Food and/or Nutrient Delivery: Continue Current Diet  Nutrition Education/Counseling: No recommendation  at this time  Coordination of Nutrition Care: Continue to monitor while inpatient       Goals:  Previous Goal Met: Progressing toward Goal(s)  Goals: Meet at least 75% of estimated needs, by next RD assessment       Nutrition Monitoring and Evaluation:   Behavioral-Environmental Outcomes: None Identified  Food/Nutrient Intake Outcomes: Food and Nutrient Intake  Physical Signs/Symptoms Outcomes: Biochemical Data, GI Status, Weight    Discharge Planning:    Too soon to determine     Jenn Hsu RD  Contact: Patricia

## 2024-08-30 NOTE — PROGRESS NOTES
PSYCHIATRIC PROGRESS NOTE         Patient Name  Sally Linn   Date of Birth 1960   [facility] [id number]   Medical Record Number  [id number]      Age  64 y.o.   PCP Sandra Correa APRN - NP   Admit date:  8/24/2024    Room Number  587/01   [facility]   Date of Service  8/30/2024            HISTORY OF PRESENT ILLNESS/INTERVAL HISTORY:              MENTAL STATUS EXAM & VITALS                    VITALS:     Patient Vitals for the past 24 hrs:   Temp Pulse Resp BP SpO2   08/30/24 1427 98.1 °F (36.7 °C) 76 -- 116/63 96 %   08/30/24 1148 98.2 °F (36.8 °C) 74 16 (!) 126/54 100 %   08/30/24 0840 98.6 °F (37 °C) 70 18 119/75 100 %   08/30/24 0317 98.4 °F (36.9 °C) 73 17 (!) 124/57 100 %   08/29/24 2304 97.9 °F (36.6 °C) 73 17 (!) 147/60 98 %   08/29/24 1925 98.1 °F (36.7 °C) 80 17 134/64 100 %     Wt Readings from Last 3 Encounters:   08/29/24 84.4 kg (186 lb)   02/15/24 72.6 kg (160 lb)   10/02/23 83.9 kg (185 lb)     Temp Readings from Last 3 Encounters:   08/30/24 98.1 °F (36.7 °C) (Oral)   02/20/24 98.2 °F (36.8 °C) (Oral)   10/03/23 97.9 °F (36.6 °C) (Oral)     BP Readings from Last 3 Encounters:   08/30/24 116/63   02/20/24 134/69   10/02/23 (!) 191/74     Pulse Readings from Last 3 Encounters:   08/30/24 76   02/20/24 78   10/02/23 66            DATA     LABORATORY DATA:(reviewed/updated 8/30/2024)  Recent Results (from the past 24 hour(s))   CBC with Auto Differential    Collection Time: 08/30/24  7:49 AM   Result Value Ref Range    WBC 10.4 3.6 - 11.0 K/uL    RBC 3.12 (L) 3.80 - 5.20 M/uL    Hemoglobin 9.2 (L) 11.5 - 16.0 g/dL    Hematocrit 28.5 (L) 35.0 - 47.0 %    MCV 91.3 80.0 - 99.0 FL    MCH 29.5 26.0 - 34.0 PG    MCHC 32.3 30.0 - 36.5 g/dL    RDW 16.6 (H) 11.5 - 14.5 %    Platelets 331 150 - 400 K/uL    MPV 11.7 8.9 - 12.9 FL    Nucleated RBCs 0.0 0.0  WBC    nRBC 0.00 0.00 - 0.01 K/uL    Neutrophils % 64 32 - 75 %    Lymphocytes % 26 12 - 49 %    Monocytes % 6 5 - 13 %     mg, Oral, Daily chlorthalidone, 12.5 mg, Oral, Daily clopidogrel, 75 mg, Oral, Daily famotidine, 20 mg, Oral, BID heparin (porcine), 5,000 Units, Subcutaneous, q8h SYLVIA losartan, 100 mg, Oral, Daily therapeutic multivitamin, 1 tablet, Oral, Daily dextrose, 0-200 mL/hr dextrose, 0-175 mL/hr   PRN medications: acetaminophen **OR** acetaminophen, dextrose, dextrose, dextrose, dextrose, glucagon, glucose **OR** glucose **OR** glucose, insulin lispro, lidocaine, LORazepam, Insert peripheral IV **AND** [COMPLETED] Saline lock IV **AND** sodium chloride flush Technique: A 23 channel video-electroencephalogram (VEEG) recording using a modified International 10-20 system (21 EEG channels and 2 ECG channels) was performed on the [employer] System.  Data were recorded at a sampling rate of 256 Hz.  Clinical events were marked on the EEG record via an event button controlled by the patient and/or family and/or clinical staff. All clinical events as well as extensive random background samples including wakefulness, drowsiness, and sleep were reviewed. Activation maneuvers: Not performed Description of Findings: Background:  Symmetry: symmetric  Predominant background frequencies: beta, alpha, and theta  Posterior dominant rhythm:  8  Voltage: normal   Anterior to posterior (AP) gradient: present  Reactivity: yes  Variability: yes  Continuity: continuous State changes: present but with abnormal stage N2 sleep transients Breach effect: absent Cyclic alternating pattern of encephalopathy (CAPE): absent Focal slowin.  Frequent, right frontotemporal, polymorphic delta and theta slowing Sporadic Epileptiform Discharges: None Other Paroxysmal Non-Epileptiform Findings:   None. Rhythmic or Periodic Patterns: none present Brief Potentially Ictal Rhythmic Discharges (BIRDs): none present Ictal-Interictal Continuum (IIC): no Clinical events, electrographic seizures and electroclinical seizures: no Non-epileptic events: no Activation

## 2024-08-30 NOTE — PLAN OF CARE
PHYSICAL THERAPY REEVALUATION  Patient: Sally Linn (64 y.o. female)  Date: 8/30/2024  Primary Diagnosis: Dizziness [R42]  Slurred speech [R47.81]  Stroke-like symptom [R29.90]  Procedure(s) (LRB):  ESOPHAGOGASTRODUODENOSCOPY (N/A) 1 Day Post-Op   Precautions: Fall Risk                      Recommendations for nursing mobility: Out of bed to chair for meals, Encourage HEP in prep for ADLs/mobility; see handout for details, AD and gt belt for bed to chair , Amb to bathroom with AD and gait belt, and Assist x1    In place during session: None  Chart, physical therapy assessment, plan of care, and goals were reviewed.      ASSESSMENT  Patient initially seen for PT evaluation 8/24/2024. Patient seen today for PT reevaluation s/t time for routine reassessment.  Pt lying in semi-supine position upon arrival, agreeable to session. 1:1 sitter in room     Based on the objective data described, the patient currently presents with impaired functional mobility, decreased activity tolerance, and poor safety awareness. (See below for objective details and assist levels).    Overall pt tolerated session well today, currently with PT. Patient transfer supine>sit with vc's and cga. Patient request assistance however with vc's patient perform with only cga. Patient transfer sit<>stand with supervision. Patient with decrease eccentric control with stand>sit. Patient gt ~30' with rw and cga. Patient stated that she typically does not use the rw secondary to decrease functino in R hand. Patient gt additional 10' with no AD and cga. Patient perform toilet transfer with cga. Patient perform perianal care with sba. Feel patient should attempt ambulation with sc on next visit. Patient perform sit and stand there-ex. Pt will benefit from continued skilled PT to address above deficits and return to PLOF, PT goals and POC reviewed on this date and updated to reflect current progress. Potential barriers for safe discharge:  pt

## 2024-08-31 PROBLEM — R29.90 STROKE-LIKE SYMPTOM: Status: ACTIVE | Noted: 2024-08-31

## 2024-09-11 ENCOUNTER — TELEPHONE (OUTPATIENT)
Age: 64
End: 2024-09-11

## 2024-09-11 NOTE — TELEPHONE ENCOUNTER
I tried to call patient to schedule a consultation with Dr. Whitfield. I left message to return my call.

## 2024-10-07 ENCOUNTER — OFFICE VISIT (OUTPATIENT)
Age: 64
End: 2024-10-07
Payer: COMMERCIAL

## 2024-10-07 VITALS
TEMPERATURE: 98.9 F | BODY MASS INDEX: 28.49 KG/M2 | OXYGEN SATURATION: 99 % | HEART RATE: 68 BPM | WEIGHT: 181.5 LBS | DIASTOLIC BLOOD PRESSURE: 82 MMHG | SYSTOLIC BLOOD PRESSURE: 121 MMHG | HEIGHT: 67 IN | RESPIRATION RATE: 16 BRPM

## 2024-10-07 DIAGNOSIS — I63.511 CEREBROVASCULAR ACCIDENT (CVA) DUE TO STENOSIS OF RIGHT MIDDLE CEREBRAL ARTERY (HCC): Primary | ICD-10-CM

## 2024-10-07 PROCEDURE — 3079F DIAST BP 80-89 MM HG: CPT | Performed by: SURGERY

## 2024-10-07 PROCEDURE — 99203 OFFICE O/P NEW LOW 30 MIN: CPT | Performed by: SURGERY

## 2024-10-07 PROCEDURE — 3074F SYST BP LT 130 MM HG: CPT | Performed by: SURGERY

## 2024-10-07 PROCEDURE — 1123F ACP DISCUSS/DSCN MKR DOCD: CPT | Performed by: SURGERY

## 2024-10-07 ASSESSMENT — PATIENT HEALTH QUESTIONNAIRE - PHQ9
SUM OF ALL RESPONSES TO PHQ9 QUESTIONS 1 & 2: 0
1. LITTLE INTEREST OR PLEASURE IN DOING THINGS: NOT AT ALL
SUM OF ALL RESPONSES TO PHQ QUESTIONS 1-9: 0
2. FEELING DOWN, DEPRESSED OR HOPELESS: NOT AT ALL

## 2024-10-07 NOTE — PROGRESS NOTES
Identified pt with two pt identifiers (name and ). Reviewed chart in preparation for visit and have obtained necessary documentation.    Sally Linn is a 64 y.o. female  Chief Complaint   Patient presents with    New Patient     Dizziness, left knee pain      /82 (Site: Right Upper Arm, Position: Sitting, Cuff Size: Large Adult)   Pulse 68   Temp 98.9 °F (37.2 °C) (Oral)   Resp 16   Ht 1.702 m (5' 7\")   Wt 82.3 kg (181 lb 8 oz)   SpO2 99%   BMI 28.43 kg/m²

## 2024-10-14 NOTE — PROGRESS NOTES
Vascular History and Physical    Patient: Sally Linn  MRN: [id number]    YOB: 1960  Age: 64 y.o.  Sex: female     Chief Complaint:  Chief Complaint   Patient presents with    New Patient     Dizziness, left knee pain        History of Present Illness: Sally Linn is a 64 y.o. very pleasant gentleman is here today for vascular assessment.  Patient had a stroke in February 2024 has developed left arm weakness.  Patient is doing much better now.  Patient also saw Dr. Stokes, neurointerventionalist.  Patient currently doing well without any new stroke symptoms such as amaurosis fugax, speech problem.  Denies any chest pain shortness of breath.    Social History:  Social Connections: Not on file       Past Medical History:  Past Medical History:   Diagnosis Date    Brain aneurysm     Hypertension        Surgical History:  Past Surgical History:   Procedure Laterality Date    ENDOVASCULAR REPAIR PERIPHERAL ANEURYSM      UPPER GASTROINTESTINAL ENDOSCOPY N/A 8/29/2024    ESOPHAGOGASTRODUODENOSCOPY performed by Dutch Barrera MD at [facility] ENDOSCOPY       Allergies:  Allergies   Allergen Reactions    Penicillins Anaphylaxis       Current Meds:  Current Outpatient Medications   Medication Sig Dispense Refill    aspirin 81 MG EC tablet Take 1 tablet by mouth daily 30 tablet 3    ferrous sulfate (IRON 325) 325 (65 Fe) MG tablet Take 1 tablet by mouth 2 times daily 180 tablet 1    pantoprazole (PROTONIX) 40 MG tablet Take 1 tablet by mouth 2 times daily (before meals) 60 tablet 0    atorvastatin (LIPITOR) 80 MG tablet Take 1 tablet by mouth nightly 30 tablet 0    amLODIPine (NORVASC) 10 MG tablet Take 1 tablet by mouth daily 30 tablet 0    clopidogrel (PLAVIX) 75 MG tablet Take 1 tablet by mouth daily 30 tablet 1    nicotine (NICODERM CQ) 21 MG/24HR Place 1 patch onto the skin daily 30 patch 3     No current facility-administered medications for this visit.       Review of

## 2024-11-18 ENCOUNTER — APPOINTMENT (OUTPATIENT)
Facility: HOSPITAL | Age: 64
End: 2024-11-18
Payer: COMMERCIAL

## 2024-11-18 ENCOUNTER — HOSPITAL ENCOUNTER (EMERGENCY)
Facility: HOSPITAL | Age: 64
Discharge: HOME | End: 2024-11-19
Attending: STUDENT IN AN ORGANIZED HEALTH CARE EDUCATION/TRAINING PROGRAM
Payer: COMMERCIAL

## 2024-11-18 DIAGNOSIS — I10 UNCONTROLLED HYPERTENSION: Primary | ICD-10-CM

## 2024-11-18 DIAGNOSIS — R51.9 NONINTRACTABLE HEADACHE, UNSPECIFIED CHRONICITY PATTERN, UNSPECIFIED HEADACHE TYPE: ICD-10-CM

## 2024-11-18 PROCEDURE — 6370000000 HC RX 637 (ALT 250 FOR IP): Performed by: STUDENT IN AN ORGANIZED HEALTH CARE EDUCATION/TRAINING PROGRAM

## 2024-11-18 PROCEDURE — 36415 COLL VENOUS BLD VENIPUNCTURE: CPT

## 2024-11-18 PROCEDURE — 85025 COMPLETE CBC W/AUTO DIFF WBC: CPT

## 2024-11-18 PROCEDURE — 99284 EMERGENCY DEPT VISIT MOD MDM: CPT

## 2024-11-18 PROCEDURE — 80053 COMPREHEN METABOLIC PANEL: CPT

## 2024-11-18 RX ORDER — HYDRALAZINE HYDROCHLORIDE 50 MG/1
100 TABLET, FILM COATED ORAL ONCE
Status: COMPLETED | OUTPATIENT
Start: 2024-11-18 | End: 2024-11-18

## 2024-11-18 RX ADMIN — HYDRALAZINE HYDROCHLORIDE 100 MG: 50 TABLET ORAL at 23:59

## 2024-11-18 ASSESSMENT — PAIN - FUNCTIONAL ASSESSMENT: PAIN_FUNCTIONAL_ASSESSMENT: 0-10

## 2024-11-18 ASSESSMENT — PAIN SCALES - GENERAL: PAINLEVEL_OUTOF10: 0

## 2024-11-19 ENCOUNTER — APPOINTMENT (OUTPATIENT)
Facility: HOSPITAL | Age: 64
End: 2024-11-19
Payer: COMMERCIAL

## 2024-11-19 VITALS
RESPIRATION RATE: 18 BRPM | TEMPERATURE: 97.8 F | HEIGHT: 67 IN | BODY MASS INDEX: 28.09 KG/M2 | WEIGHT: 179 LBS | OXYGEN SATURATION: 99 % | DIASTOLIC BLOOD PRESSURE: 64 MMHG | SYSTOLIC BLOOD PRESSURE: 140 MMHG | HEART RATE: 81 BPM

## 2024-11-19 LAB
ALBUMIN SERPL-MCNC: 3.9 G/DL (ref 3.5–5)
ALBUMIN/GLOB SERPL: 1 (ref 1.1–2.2)
ALP SERPL-CCNC: 114 U/L (ref 45–117)
ALT SERPL-CCNC: 23 U/L (ref 12–78)
ANION GAP SERPL CALC-SCNC: 5 MMOL/L (ref 2–12)
AST SERPL W P-5'-P-CCNC: 15 U/L (ref 15–37)
BASOPHILS # BLD: 0.1 K/UL (ref 0–0.1)
BASOPHILS NFR BLD: 1 % (ref 0–1)
BILIRUB SERPL-MCNC: 0.2 MG/DL (ref 0.2–1)
BUN SERPL-MCNC: 27 MG/DL (ref 6–20)
BUN/CREAT SERPL: 22 (ref 12–20)
CA-I BLD-MCNC: 9.2 MG/DL (ref 8.5–10.1)
CHLORIDE SERPL-SCNC: 106 MMOL/L (ref 97–108)
CO2 SERPL-SCNC: 29 MMOL/L (ref 21–32)
CREAT SERPL-MCNC: 1.23 MG/DL (ref 0.55–1.02)
DIFFERENTIAL METHOD BLD: ABNORMAL
EOSINOPHIL # BLD: 0.5 K/UL (ref 0–0.4)
EOSINOPHIL NFR BLD: 5 % (ref 0–7)
ERYTHROCYTE [DISTWIDTH] IN BLOOD BY AUTOMATED COUNT: 13.6 % (ref 11.5–14.5)
GLOBULIN SER CALC-MCNC: 3.9 G/DL (ref 2–4)
GLUCOSE SERPL-MCNC: 169 MG/DL (ref 65–100)
HCT VFR BLD AUTO: 38.3 % (ref 35–47)
HGB BLD-MCNC: 12.4 G/DL (ref 11.5–16)
IMM GRANULOCYTES # BLD AUTO: 0 K/UL (ref 0–0.04)
IMM GRANULOCYTES NFR BLD AUTO: 0 % (ref 0–0.5)
LYMPHOCYTES # BLD: 3.2 K/UL (ref 0.8–3.5)
LYMPHOCYTES NFR BLD: 37 % (ref 12–49)
MCH RBC QN AUTO: 29.2 PG (ref 26–34)
MCHC RBC AUTO-ENTMCNC: 32.4 G/DL (ref 30–36.5)
MCV RBC AUTO: 90.1 FL (ref 80–99)
MONOCYTES # BLD: 0.7 K/UL (ref 0–1)
MONOCYTES NFR BLD: 8 % (ref 5–13)
NEUTS SEG # BLD: 4.1 K/UL (ref 1.8–8)
NEUTS SEG NFR BLD: 49 % (ref 32–75)
NRBC # BLD: 0 K/UL (ref 0–0.01)
NRBC BLD-RTO: 0 PER 100 WBC
PLATELET # BLD AUTO: 240 K/UL (ref 150–400)
PMV BLD AUTO: 12.3 FL (ref 8.9–12.9)
POTASSIUM SERPL-SCNC: 3.3 MMOL/L (ref 3.5–5.1)
PROT SERPL-MCNC: 7.8 G/DL (ref 6.4–8.2)
RBC # BLD AUTO: 4.25 M/UL (ref 3.8–5.2)
SODIUM SERPL-SCNC: 140 MMOL/L (ref 136–145)
WBC # BLD AUTO: 8.6 K/UL (ref 3.6–11)

## 2024-11-19 PROCEDURE — 70450 CT HEAD/BRAIN W/O DYE: CPT

## 2024-11-19 NOTE — DISCHARGE INSTRUCTIONS
Thank you for choosing our Emergency Department for your care.  It is our privilege to care for you in your time of need.  In the next several days, you may receive a survey via email or mailed to your home about your experience with our team.  We would greatly appreciate you taking a few minutes to complete the survey, as we use this information to learn what we have done well and what we could be doing better. Thank you for trusting us with your care!    Below you will find a list of your tests from today's visit.   Labs  Recent Results (from the past 12 hour(s))   CBC with Auto Differential    Collection Time: 11/18/24 11:51 PM   Result Value Ref Range    WBC 8.6 3.6 - 11.0 K/uL    RBC 4.25 3.80 - 5.20 M/uL    Hemoglobin 12.4 11.5 - 16.0 g/dL    Hematocrit 38.3 35.0 - 47.0 %    MCV 90.1 80.0 - 99.0 FL    MCH 29.2 26.0 - 34.0 PG    MCHC 32.4 30.0 - 36.5 g/dL    RDW 13.6 11.5 - 14.5 %    Platelets 240 150 - 400 K/uL    MPV 12.3 8.9 - 12.9 FL    Nucleated RBCs 0.0 0.0  WBC    nRBC 0.00 0.00 - 0.01 K/uL    Neutrophils % 49 32 - 75 %    Lymphocytes % 37 12 - 49 %    Monocytes % 8 5 - 13 %    Eosinophils % 5 0 - 7 %    Basophils % 1 0 - 1 %    Immature Granulocytes % 0 0 - 0.5 %    Neutrophils Absolute 4.1 1.8 - 8.0 K/UL    Lymphocytes Absolute 3.2 0.8 - 3.5 K/UL    Monocytes Absolute 0.7 0.0 - 1.0 K/UL    Eosinophils Absolute 0.5 (H) 0.0 - 0.4 K/UL    Basophils Absolute 0.1 0.0 - 0.1 K/UL    Immature Granulocytes Absolute 0.0 0.00 - 0.04 K/UL    Differential Type AUTOMATED     Comprehensive Metabolic Panel    Collection Time: 11/18/24 11:51 PM   Result Value Ref Range    Sodium 140 136 - 145 mmol/L    Potassium 3.3 (L) 3.5 - 5.1 mmol/L    Chloride 106 97 - 108 mmol/L    CO2 29 21 - 32 mmol/L    Anion Gap 5 2 - 12 mmol/L    Glucose 169 (H) 65 - 100 mg/dL    BUN 27 (H) 6 - 20 mg/dL    Creatinine 1.23 (H) 0.55 - 1.02 mg/dL    BUN/Creatinine Ratio 22 (H) 12 - 20      Est, Glom Filt Rate 49 (L) >60 ml/min/1.73m2

## 2024-11-19 NOTE — ED TRIAGE NOTES
Patient arrives via EMS with c/o headache x1 day with dizziness. Patient has a known history of multiple strokes as well as brain and abdominal aneurysm. -NIH stroke scale at this moment. Patient refused IV in route.

## 2024-11-19 NOTE — ED PROVIDER NOTES
aspirin 81 MG EC tablet Take 1 tablet by mouth daily 30 tablet 3    ferrous sulfate (IRON 325) 325 (65 Fe) MG tablet Take 1 tablet by mouth 2 times daily 180 tablet 1    pantoprazole (PROTONIX) 40 MG tablet Take 1 tablet by mouth 2 times daily (before meals) 60 tablet 0    atorvastatin (LIPITOR) 80 MG tablet Take 1 tablet by mouth nightly 30 tablet 0    amLODIPine (NORVASC) 10 MG tablet Take 1 tablet by mouth daily 30 tablet 0    clopidogrel (PLAVIX) 75 MG tablet Take 1 tablet by mouth daily 30 tablet 1    nicotine (NICODERM CQ) 21 MG/24HR Place 1 patch onto the skin daily 30 patch 3       Social Determinants of Health:   Social Determinants of Health     Tobacco Use: Medium Risk (11/18/2024)    Patient History     Smoking Tobacco Use: Former     Smokeless Tobacco Use: Never     Passive Exposure: Not on file   Alcohol Use: Not At Risk (11/18/2024)    AUDIT-C     Frequency of Alcohol Consumption: Never     Average Number of Drinks: Patient does not drink     Frequency of Binge Drinking: Never   Financial Resource Strain: Not on file   Food Insecurity: No Food Insecurity (8/24/2024)    Hunger Vital Sign     Worried About Running Out of Food in the Last Year: Never true     Ran Out of Food in the Last Year: Never true   Transportation Needs: No Transportation Needs (8/24/2024)    PRAPARE - Transportation     Lack of Transportation (Medical): No     Lack of Transportation (Non-Medical): No   Physical Activity: Not on file   Stress: Not on file   Social Connections: Not on file   Intimate Partner Violence: Not on file   Depression: Not at risk (10/7/2024)    PHQ-2     PHQ-2 Score: 0   Housing Stability: Low Risk  (8/24/2024)    Housing Stability Vital Sign     Unable to Pay for Housing in the Last Year: No     Number of Times Moved in the Last Year: 1     Homeless in the Last Year: No   Interpersonal Safety: Not At Risk (11/18/2024)    Interpersonal Safety Domain Source: IP Abuse Screening     Physical abuse: Denies

## 2024-12-30 ENCOUNTER — HOSPITAL ENCOUNTER (EMERGENCY)
Facility: HOSPITAL | Age: 64
Discharge: HOME | End: 2024-12-30
Attending: EMERGENCY MEDICINE
Payer: COMMERCIAL

## 2024-12-30 VITALS
HEIGHT: 67 IN | WEIGHT: 179 LBS | HEART RATE: 74 BPM | SYSTOLIC BLOOD PRESSURE: 121 MMHG | OXYGEN SATURATION: 100 % | TEMPERATURE: 98.1 F | DIASTOLIC BLOOD PRESSURE: 77 MMHG | BODY MASS INDEX: 28.09 KG/M2 | RESPIRATION RATE: 18 BRPM

## 2024-12-30 DIAGNOSIS — K29.00 ACUTE GASTRITIS WITHOUT HEMORRHAGE, UNSPECIFIED GASTRITIS TYPE: ICD-10-CM

## 2024-12-30 DIAGNOSIS — R10.9 ABDOMINAL PAIN, UNSPECIFIED ABDOMINAL LOCATION: Primary | ICD-10-CM

## 2024-12-30 LAB
ALBUMIN SERPL-MCNC: 3.5 G/DL (ref 3.5–5)
ALBUMIN/GLOB SERPL: 1 (ref 1.1–2.2)
ALP SERPL-CCNC: 80 U/L (ref 45–117)
ALT SERPL-CCNC: 23 U/L (ref 12–78)
ANION GAP SERPL CALC-SCNC: 3 MMOL/L (ref 2–12)
AST SERPL W P-5'-P-CCNC: 19 U/L (ref 15–37)
BASOPHILS # BLD: 0 K/UL (ref 0–0.1)
BASOPHILS NFR BLD: 0 % (ref 0–1)
BILIRUB SERPL-MCNC: 0.3 MG/DL (ref 0.2–1)
BUN SERPL-MCNC: 30 MG/DL (ref 6–20)
BUN/CREAT SERPL: 22 (ref 12–20)
CA-I BLD-MCNC: 9 MG/DL (ref 8.5–10.1)
CHLORIDE SERPL-SCNC: 110 MMOL/L (ref 97–108)
CO2 SERPL-SCNC: 25 MMOL/L (ref 21–32)
CREAT SERPL-MCNC: 1.37 MG/DL (ref 0.55–1.02)
DIFFERENTIAL METHOD BLD: ABNORMAL
EOSINOPHIL # BLD: 0.2 K/UL (ref 0–0.4)
EOSINOPHIL NFR BLD: 3 % (ref 0–7)
ERYTHROCYTE [DISTWIDTH] IN BLOOD BY AUTOMATED COUNT: 14.1 % (ref 11.5–14.5)
GLOBULIN SER CALC-MCNC: 3.5 G/DL (ref 2–4)
GLUCOSE SERPL-MCNC: 109 MG/DL (ref 65–100)
HCT VFR BLD AUTO: 36.9 % (ref 35–47)
HGB BLD-MCNC: 11.7 G/DL (ref 11.5–16)
IMM GRANULOCYTES # BLD AUTO: 0.1 K/UL (ref 0–0.04)
IMM GRANULOCYTES NFR BLD AUTO: 1 % (ref 0–0.5)
LACTATE SERPL-SCNC: 0.8 MMOL/L (ref 0.4–2)
LIPASE SERPL-CCNC: 28 U/L (ref 13–75)
LYMPHOCYTES # BLD: 1.2 K/UL (ref 0.8–3.5)
LYMPHOCYTES NFR BLD: 17 % (ref 12–49)
MCH RBC QN AUTO: 28.7 PG (ref 26–34)
MCHC RBC AUTO-ENTMCNC: 31.7 G/DL (ref 30–36.5)
MCV RBC AUTO: 90.7 FL (ref 80–99)
MONOCYTES # BLD: 0.8 K/UL (ref 0–1)
MONOCYTES NFR BLD: 10 % (ref 5–13)
NEUTS SEG # BLD: 5.1 K/UL (ref 1.8–8)
NEUTS SEG NFR BLD: 69 % (ref 32–75)
NRBC # BLD: 0 K/UL (ref 0–0.01)
NRBC BLD-RTO: 0 PER 100 WBC
PLATELET # BLD AUTO: 217 K/UL (ref 150–400)
PMV BLD AUTO: 12.4 FL (ref 8.9–12.9)
POTASSIUM SERPL-SCNC: 3.5 MMOL/L (ref 3.5–5.1)
PROT SERPL-MCNC: 7 G/DL (ref 6.4–8.2)
RBC # BLD AUTO: 4.07 M/UL (ref 3.8–5.2)
SODIUM SERPL-SCNC: 138 MMOL/L (ref 136–145)
WBC # BLD AUTO: 7.3 K/UL (ref 3.6–11)

## 2024-12-30 PROCEDURE — 36415 COLL VENOUS BLD VENIPUNCTURE: CPT

## 2024-12-30 PROCEDURE — 80053 COMPREHEN METABOLIC PANEL: CPT

## 2024-12-30 PROCEDURE — 99284 EMERGENCY DEPT VISIT MOD MDM: CPT

## 2024-12-30 PROCEDURE — 6370000000 HC RX 637 (ALT 250 FOR IP): Performed by: EMERGENCY MEDICINE

## 2024-12-30 PROCEDURE — 85025 COMPLETE CBC W/AUTO DIFF WBC: CPT

## 2024-12-30 PROCEDURE — 83605 ASSAY OF LACTIC ACID: CPT

## 2024-12-30 PROCEDURE — 83690 ASSAY OF LIPASE: CPT

## 2024-12-30 RX ORDER — LIDOCAINE HYDROCHLORIDE 20 MG/ML
15 SOLUTION OROPHARYNGEAL
Status: COMPLETED | OUTPATIENT
Start: 2024-12-30 | End: 2024-12-30

## 2024-12-30 RX ORDER — ONDANSETRON 4 MG/1
4 TABLET, ORALLY DISINTEGRATING ORAL ONCE
Status: DISCONTINUED | OUTPATIENT
Start: 2024-12-30 | End: 2024-12-30 | Stop reason: HOSPADM

## 2024-12-30 RX ORDER — DICYCLOMINE HCL 20 MG
20 TABLET ORAL 4 TIMES DAILY
Qty: 20 TABLET | Refills: 0 | Status: SHIPPED | OUTPATIENT
Start: 2024-12-30

## 2024-12-30 RX ORDER — ONDANSETRON 4 MG/1
4 TABLET, ORALLY DISINTEGRATING ORAL 3 TIMES DAILY PRN
Qty: 21 TABLET | Refills: 0 | Status: SHIPPED | OUTPATIENT
Start: 2024-12-30

## 2024-12-30 RX ADMIN — LIDOCAINE HYDROCHLORIDE 15 ML: 20 SOLUTION ORAL at 17:40

## 2024-12-30 ASSESSMENT — PAIN SCALES - GENERAL
PAINLEVEL_OUTOF10: 0
PAINLEVEL_OUTOF10: 0

## 2024-12-30 ASSESSMENT — PAIN - FUNCTIONAL ASSESSMENT: PAIN_FUNCTIONAL_ASSESSMENT: NONE - DENIES PAIN

## 2024-12-30 NOTE — ED PROVIDER NOTES
location    2. Acute gastritis without hemorrhage, unspecified gastritis type          DISPOSITION/PLAN   DISPOSITION Decision To Discharge 12/30/2024 06:42:15 PM   DISPOSITION CONDITION Stable         Discharge Note: The patient is stable for discharge home. The signs, symptoms, diagnosis, and discharge instructions have been discussed, understanding conveyed, and agreed upon. The patient is to follow up as recommended or return to ER should their symptoms worsen.      PATIENT REFERRED TO:  Sandra Correa APRN - NP  [address] [facility]  [address] [facility] [address] [phone]    Call in 2 days  As needed, If symptoms worsen        DISCHARGE MEDICATIONS:     Medication List        START taking these medications      dicyclomine 20 MG tablet  Commonly known as: BENTYL  Take 1 tablet by mouth 4 times daily     ondansetron 4 MG disintegrating tablet  Commonly known as: ZOFRAN-ODT  Take 1 tablet by mouth 3 times daily as needed for Nausea or Vomiting            ASK your doctor about these medications      amLODIPine 10 MG tablet  Commonly known as: NORVASC  Take 1 tablet by mouth daily     aspirin 81 MG EC tablet  Take 1 tablet by mouth daily     atorvastatin 80 MG tablet  Commonly known as: LIPITOR  Take 1 tablet by mouth nightly     clopidogrel 75 MG tablet  Commonly known as: PLAVIX  Take 1 tablet by mouth daily     ferrous sulfate 325 (65 Fe) MG tablet  Commonly known as: IRON 325  Take 1 tablet by mouth 2 times daily     nicotine 21 MG/24HR  Commonly known as: NICODERM CQ  Place 1 patch onto the skin daily     pantoprazole 40 MG tablet  Commonly known as: PROTONIX  Take 1 tablet by mouth 2 times daily (before meals)               Where to Get Your Medications        These medications were sent to [id number] - [place], [place] - [address] - P [phone] - F [phone] [id number] [facility] [address]      Phone: [phone]   dicyclomine 20 MG tablet  ondansetron 4 MG

## 2025-01-30 ENCOUNTER — TELEPHONE (OUTPATIENT)
Age: 65
End: 2025-01-30

## 2025-01-30 NOTE — TELEPHONE ENCOUNTER
Per Dr. Whitfield's last office note in 10/2024:    I will schedule him for carotid duplex ultrasound 6-month follow-up.     Patient needs to call and schedule duplex [phone] and then reschedule her follow up appointment with  .

## 2025-01-31 NOTE — TELEPHONE ENCOUNTER
Spoke with the daughter and let her know that her mother needed to have a Vascular duplex done before she sees  and that the appointment that she has for Feb 3,2025 has to be canceled and I gave the number to the daughter so she could schedule the test.

## 2025-03-31 ENCOUNTER — TRANSCRIBE ORDERS (OUTPATIENT)
Facility: HOSPITAL | Age: 65
End: 2025-03-31

## 2025-03-31 DIAGNOSIS — Z00.00 ROUTINE GENERAL MEDICAL EXAMINATION AT A HEALTH CARE FACILITY: Primary | ICD-10-CM

## 2025-07-19 ENCOUNTER — TRANSCRIBE ORDERS (OUTPATIENT)
Facility: HOSPITAL | Age: 65
End: 2025-07-19

## 2025-07-19 DIAGNOSIS — Z87.891 PERSONAL HISTORY OF TOBACCO USE: Primary | ICD-10-CM

## (undated) DEVICE — CANNULA NSL O2 AD 7 FT END-TIDAL CARBON DIOX VENTFLO

## (undated) DEVICE — FORCEPS BX 240CM 2.4MM L NDL RAD JAW 4 M00513334

## (undated) DEVICE — KIT ENDOSCOPIC  PROC VIA